# Patient Record
Sex: MALE | Race: OTHER | NOT HISPANIC OR LATINO | URBAN - METROPOLITAN AREA
[De-identification: names, ages, dates, MRNs, and addresses within clinical notes are randomized per-mention and may not be internally consistent; named-entity substitution may affect disease eponyms.]

---

## 2018-05-01 ENCOUNTER — EMERGENCY (EMERGENCY)
Facility: HOSPITAL | Age: 63
LOS: 0 days | Discharge: ANOTHER HOSPITAL | End: 2018-05-01
Attending: EMERGENCY MEDICINE
Payer: MEDICAID

## 2018-05-01 ENCOUNTER — OUTPATIENT (OUTPATIENT)
Dept: OUTPATIENT SERVICES | Facility: HOSPITAL | Age: 63
LOS: 1 days | End: 2018-05-01
Payer: MEDICAID

## 2018-05-01 ENCOUNTER — INPATIENT (INPATIENT)
Facility: HOSPITAL | Age: 63
LOS: 3 days | DRG: 270 | End: 2018-05-05
Attending: STUDENT IN AN ORGANIZED HEALTH CARE EDUCATION/TRAINING PROGRAM | Admitting: STUDENT IN AN ORGANIZED HEALTH CARE EDUCATION/TRAINING PROGRAM
Payer: MEDICAID

## 2018-05-01 VITALS
TEMPERATURE: 98 F | WEIGHT: 191.58 LBS | OXYGEN SATURATION: 100 % | HEART RATE: 114 BPM | DIASTOLIC BLOOD PRESSURE: 67 MMHG | HEIGHT: 67 IN | RESPIRATION RATE: 18 BRPM | SYSTOLIC BLOOD PRESSURE: 122 MMHG

## 2018-05-01 VITALS — HEART RATE: 120 BPM | SYSTOLIC BLOOD PRESSURE: 101 MMHG | DIASTOLIC BLOOD PRESSURE: 71 MMHG

## 2018-05-01 VITALS
HEIGHT: 68 IN | RESPIRATION RATE: 24 BRPM | HEART RATE: 127 BPM | WEIGHT: 197.98 LBS | OXYGEN SATURATION: 73 % | SYSTOLIC BLOOD PRESSURE: 88 MMHG | TEMPERATURE: 98 F | DIASTOLIC BLOOD PRESSURE: 67 MMHG

## 2018-05-01 DIAGNOSIS — Z29.9 ENCOUNTER FOR PROPHYLACTIC MEASURES, UNSPECIFIED: ICD-10-CM

## 2018-05-01 DIAGNOSIS — I23.8 OTHER CURRENT COMPLICATIONS FOLLOWING ACUTE MYOCARDIAL INFARCTION: ICD-10-CM

## 2018-05-01 DIAGNOSIS — I21.4 NON-ST ELEVATION (NSTEMI) MYOCARDIAL INFARCTION: ICD-10-CM

## 2018-05-01 DIAGNOSIS — I50.22 CHRONIC SYSTOLIC (CONGESTIVE) HEART FAILURE: ICD-10-CM

## 2018-05-01 DIAGNOSIS — N17.9 ACUTE KIDNEY FAILURE, UNSPECIFIED: ICD-10-CM

## 2018-05-01 DIAGNOSIS — I25.10 ATHEROSCLEROTIC HEART DISEASE OF NATIVE CORONARY ARTERY WITHOUT ANGINA PECTORIS: ICD-10-CM

## 2018-05-01 DIAGNOSIS — Z95.810 PRESENCE OF AUTOMATIC (IMPLANTABLE) CARDIAC DEFIBRILLATOR: Chronic | ICD-10-CM

## 2018-05-01 DIAGNOSIS — E78.5 HYPERLIPIDEMIA, UNSPECIFIED: ICD-10-CM

## 2018-05-01 DIAGNOSIS — I50.23 ACUTE ON CHRONIC SYSTOLIC (CONGESTIVE) HEART FAILURE: ICD-10-CM

## 2018-05-01 LAB
ALBUMIN SERPL ELPH-MCNC: 3.3 G/DL — SIGNIFICANT CHANGE UP (ref 3.3–5)
ALBUMIN SERPL ELPH-MCNC: 3.6 G/DL — SIGNIFICANT CHANGE UP (ref 3.3–5)
ALBUMIN SERPL ELPH-MCNC: 3.7 G/DL — SIGNIFICANT CHANGE UP (ref 3.3–5)
ALBUMIN SERPL ELPH-MCNC: 3.7 G/DL — SIGNIFICANT CHANGE UP (ref 3.3–5)
ALP SERPL-CCNC: 104 U/L — SIGNIFICANT CHANGE UP (ref 40–120)
ALP SERPL-CCNC: 104 U/L — SIGNIFICANT CHANGE UP (ref 40–120)
ALP SERPL-CCNC: 129 U/L — HIGH (ref 40–120)
ALP SERPL-CCNC: 96 U/L — SIGNIFICANT CHANGE UP (ref 40–120)
ALT FLD-CCNC: 1024 U/L — HIGH (ref 10–45)
ALT FLD-CCNC: 2027 U/L — HIGH (ref 10–45)
ALT FLD-CCNC: 288 U/L — HIGH (ref 12–78)
ALT FLD-CCNC: 302 U/L — HIGH (ref 10–45)
ANION GAP SERPL CALC-SCNC: 17 MMOL/L — SIGNIFICANT CHANGE UP (ref 5–17)
ANION GAP SERPL CALC-SCNC: 18 MMOL/L — HIGH (ref 5–17)
ANION GAP SERPL CALC-SCNC: 23 MMOL/L — HIGH (ref 5–17)
ANION GAP SERPL CALC-SCNC: 30 MMOL/L — HIGH (ref 5–17)
APTT BLD: 26.6 SEC — LOW (ref 27.5–37.4)
APTT BLD: 32 SEC — SIGNIFICANT CHANGE UP (ref 27.5–37.4)
APTT BLD: > 200 SEC (ref 27.5–37.4)
AST SERPL-CCNC: 1359 U/L — HIGH (ref 10–40)
AST SERPL-CCNC: 3202 U/L — HIGH (ref 10–40)
AST SERPL-CCNC: 367 U/L — HIGH (ref 15–37)
AST SERPL-CCNC: 376 U/L — HIGH (ref 10–40)
BASE EXCESS BLDMV CALC-SCNC: -0.5 MMOL/L — SIGNIFICANT CHANGE UP (ref -3–3)
BASE EXCESS BLDMV CALC-SCNC: -1.3 MMOL/L — SIGNIFICANT CHANGE UP (ref -3–3)
BASE EXCESS BLDMV CALC-SCNC: -13.4 MMOL/L — LOW (ref -3–3)
BASE EXCESS BLDMV CALC-SCNC: -2 MMOL/L — SIGNIFICANT CHANGE UP (ref -3–3)
BASE EXCESS BLDMV CALC-SCNC: -2.7 MMOL/L — SIGNIFICANT CHANGE UP (ref -3–3)
BASE EXCESS BLDMV CALC-SCNC: -5.7 MMOL/L — LOW (ref -3–3)
BASE EXCESS BLDMV CALC-SCNC: -9.6 MMOL/L — LOW (ref -3–3)
BASE EXCESS BLDMV CALC-SCNC: 0.3 MMOL/L — SIGNIFICANT CHANGE UP (ref -3–3)
BASOPHILS # BLD AUTO: 0.04 K/UL — SIGNIFICANT CHANGE UP (ref 0–0.2)
BASOPHILS NFR BLD AUTO: 0.3 % — SIGNIFICANT CHANGE UP (ref 0–2)
BILIRUB SERPL-MCNC: 2.5 MG/DL — HIGH (ref 0.2–1.2)
BILIRUB SERPL-MCNC: 2.6 MG/DL — HIGH (ref 0.2–1.2)
BILIRUB SERPL-MCNC: 2.7 MG/DL — HIGH (ref 0.2–1.2)
BILIRUB SERPL-MCNC: 3.3 MG/DL — HIGH (ref 0.2–1.2)
BUN SERPL-MCNC: 39 MG/DL — HIGH (ref 7–23)
BUN SERPL-MCNC: 42 MG/DL — HIGH (ref 7–23)
BUN SERPL-MCNC: 49 MG/DL — HIGH (ref 7–23)
BUN SERPL-MCNC: 54 MG/DL — HIGH (ref 7–23)
CALCIUM SERPL-MCNC: 7.9 MG/DL — LOW (ref 8.4–10.5)
CALCIUM SERPL-MCNC: 8.2 MG/DL — LOW (ref 8.4–10.5)
CALCIUM SERPL-MCNC: 8.4 MG/DL — LOW (ref 8.5–10.1)
CALCIUM SERPL-MCNC: 8.5 MG/DL — SIGNIFICANT CHANGE UP (ref 8.4–10.5)
CHLORIDE SERPL-SCNC: 100 MMOL/L — SIGNIFICANT CHANGE UP (ref 96–108)
CHLORIDE SERPL-SCNC: 100 MMOL/L — SIGNIFICANT CHANGE UP (ref 96–108)
CHLORIDE SERPL-SCNC: 106 MMOL/L — SIGNIFICANT CHANGE UP (ref 96–108)
CHLORIDE SERPL-SCNC: 99 MMOL/L — SIGNIFICANT CHANGE UP (ref 96–108)
CHOLEST SERPL-MCNC: 231 MG/DL — HIGH (ref 10–199)
CK MB BLD-MCNC: 5.1 % — HIGH (ref 0–3.5)
CK MB BLD-MCNC: 5.1 % — HIGH (ref 0–3.5)
CK MB BLD-MCNC: 5.9 % — HIGH (ref 0–3.5)
CK MB BLD-MCNC: 6.1 % — HIGH (ref 0–3.5)
CK MB CFR SERPL CALC: 20.7 NG/ML — HIGH (ref 0.5–3.6)
CK MB CFR SERPL CALC: 23.6 NG/ML — HIGH (ref 0–6.7)
CK MB CFR SERPL CALC: 28.2 NG/ML — HIGH (ref 0–6.7)
CK MB CFR SERPL CALC: 30 NG/ML — HIGH (ref 0–6.7)
CK SERPL-CCNC: 386 U/L — HIGH (ref 30–200)
CK SERPL-CCNC: 408 U/L — HIGH (ref 26–308)
CK SERPL-CCNC: 506 U/L — HIGH (ref 30–200)
CK SERPL-CCNC: 553 U/L — HIGH (ref 30–200)
CO2 BLDMV-SCNC: 13 MMOL/L — LOW (ref 21–29)
CO2 BLDMV-SCNC: 16 MMOL/L — LOW (ref 21–29)
CO2 BLDMV-SCNC: 19 MMOL/L — LOW (ref 21–29)
CO2 BLDMV-SCNC: 22 MMOL/L — SIGNIFICANT CHANGE UP (ref 21–29)
CO2 BLDMV-SCNC: 23 MMOL/L — SIGNIFICANT CHANGE UP (ref 21–29)
CO2 BLDMV-SCNC: 23 MMOL/L — SIGNIFICANT CHANGE UP (ref 21–29)
CO2 BLDMV-SCNC: 24 MMOL/L — SIGNIFICANT CHANGE UP (ref 21–29)
CO2 BLDMV-SCNC: 25 MMOL/L — SIGNIFICANT CHANGE UP (ref 21–29)
CO2 SERPL-SCNC: 10 MMOL/L — CRITICAL LOW (ref 22–31)
CO2 SERPL-SCNC: 11 MMOL/L — LOW (ref 22–31)
CO2 SERPL-SCNC: 18 MMOL/L — LOW (ref 22–31)
CO2 SERPL-SCNC: 18 MMOL/L — LOW (ref 22–31)
CREAT SERPL-MCNC: 1.8 MG/DL — HIGH (ref 0.5–1.3)
CREAT SERPL-MCNC: 2.22 MG/DL — HIGH (ref 0.5–1.3)
CREAT SERPL-MCNC: 2.24 MG/DL — HIGH (ref 0.5–1.3)
CREAT SERPL-MCNC: 2.26 MG/DL — HIGH (ref 0.5–1.3)
EOSINOPHIL # BLD AUTO: 0 K/UL — SIGNIFICANT CHANGE UP (ref 0–0.5)
EOSINOPHIL NFR BLD AUTO: 0 % — SIGNIFICANT CHANGE UP (ref 0–6)
GAS PNL BLDA: SIGNIFICANT CHANGE UP
GAS PNL BLDMV: SIGNIFICANT CHANGE UP
GLUCOSE SERPL-MCNC: 105 MG/DL — HIGH (ref 70–99)
GLUCOSE SERPL-MCNC: 124 MG/DL — HIGH (ref 70–99)
GLUCOSE SERPL-MCNC: 127 MG/DL — HIGH (ref 70–99)
GLUCOSE SERPL-MCNC: 157 MG/DL — HIGH (ref 70–99)
HBA1C BLD-MCNC: 5.5 % — SIGNIFICANT CHANGE UP (ref 4–5.6)
HCO3 BLDMV-SCNC: 12 MMOL/L — LOW (ref 20–28)
HCO3 BLDMV-SCNC: 15 MMOL/L — LOW (ref 20–28)
HCO3 BLDMV-SCNC: 18 MMOL/L — LOW (ref 20–28)
HCO3 BLDMV-SCNC: 21 MMOL/L — SIGNIFICANT CHANGE UP (ref 20–28)
HCO3 BLDMV-SCNC: 22 MMOL/L — SIGNIFICANT CHANGE UP (ref 20–28)
HCO3 BLDMV-SCNC: 22 MMOL/L — SIGNIFICANT CHANGE UP (ref 20–28)
HCO3 BLDMV-SCNC: 23 MMOL/L — SIGNIFICANT CHANGE UP (ref 20–28)
HCO3 BLDMV-SCNC: 24 MMOL/L — SIGNIFICANT CHANGE UP (ref 20–28)
HCT VFR BLD CALC: 35.9 % — LOW (ref 39–50)
HCT VFR BLD CALC: 38.4 % — LOW (ref 39–50)
HCT VFR BLD CALC: 39.9 % — SIGNIFICANT CHANGE UP (ref 39–50)
HCT VFR BLD CALC: 42.6 % — SIGNIFICANT CHANGE UP (ref 39–50)
HDLC SERPL-MCNC: 21 MG/DL — LOW (ref 40–125)
HGB BLD-MCNC: 11.7 G/DL — LOW (ref 13–17)
HGB BLD-MCNC: 12.6 G/DL — LOW (ref 13–17)
HGB BLD-MCNC: 12.7 G/DL — LOW (ref 13–17)
HGB BLD-MCNC: 12.7 G/DL — LOW (ref 13–17)
HOROWITZ INDEX BLDMV+IHG-RTO: 21 — SIGNIFICANT CHANGE UP
HOROWITZ INDEX BLDMV+IHG-RTO: 21 — SIGNIFICANT CHANGE UP
HOROWITZ INDEX BLDMV+IHG-RTO: 28 — SIGNIFICANT CHANGE UP
HOROWITZ INDEX BLDMV+IHG-RTO: 40 — SIGNIFICANT CHANGE UP
HOROWITZ INDEX BLDMV+IHG-RTO: 40 — SIGNIFICANT CHANGE UP
IMM GRANULOCYTES NFR BLD AUTO: 0.5 % — SIGNIFICANT CHANGE UP (ref 0–1.5)
INR BLD: 1.41 RATIO — HIGH (ref 0.88–1.16)
INR BLD: 1.74 RATIO — HIGH (ref 0.88–1.16)
INR BLD: 2 RATIO — HIGH (ref 0.88–1.16)
LACTATE SERPL-SCNC: 10.2 MMOL/L — CRITICAL HIGH (ref 0.7–2)
LACTATE SERPL-SCNC: 2.4 MMOL/L — HIGH (ref 0.7–2)
LIPID PNL WITH DIRECT LDL SERPL: 195 MG/DL — HIGH
LYMPHOCYTES # BLD AUTO: 1.18 K/UL — SIGNIFICANT CHANGE UP (ref 1–3.3)
LYMPHOCYTES # BLD AUTO: 8.7 % — LOW (ref 13–44)
MAGNESIUM SERPL-MCNC: 2 MG/DL — SIGNIFICANT CHANGE UP (ref 1.6–2.6)
MAGNESIUM SERPL-MCNC: 2.2 MG/DL — SIGNIFICANT CHANGE UP (ref 1.6–2.6)
MAGNESIUM SERPL-MCNC: 2.3 MG/DL — SIGNIFICANT CHANGE UP (ref 1.6–2.6)
MCHC RBC-ENTMCNC: 19.4 PG — LOW (ref 27–34)
MCHC RBC-ENTMCNC: 21 PG — LOW (ref 27–34)
MCHC RBC-ENTMCNC: 21.1 PG — LOW (ref 27–34)
MCHC RBC-ENTMCNC: 21.4 PG — LOW (ref 27–34)
MCHC RBC-ENTMCNC: 29.6 GM/DL — LOW (ref 32–36)
MCHC RBC-ENTMCNC: 31.7 GM/DL — LOW (ref 32–36)
MCHC RBC-ENTMCNC: 32.6 GM/DL — SIGNIFICANT CHANGE UP (ref 32–36)
MCHC RBC-ENTMCNC: 33 GM/DL — SIGNIFICANT CHANGE UP (ref 32–36)
MCV RBC AUTO: 64.7 FL — LOW (ref 80–100)
MCV RBC AUTO: 64.8 FL — LOW (ref 80–100)
MCV RBC AUTO: 65.5 FL — LOW (ref 80–100)
MCV RBC AUTO: 66.2 FL — LOW (ref 80–100)
MONOCYTES # BLD AUTO: 0.98 K/UL — HIGH (ref 0–0.9)
MONOCYTES NFR BLD AUTO: 7.3 % — SIGNIFICANT CHANGE UP (ref 2–14)
NEUTROPHILS # BLD AUTO: 11.22 K/UL — HIGH (ref 1.8–7.4)
NEUTROPHILS NFR BLD AUTO: 83.2 % — HIGH (ref 43–77)
NRBC # BLD: 0 /100 WBCS — SIGNIFICANT CHANGE UP (ref 0–0)
NT-PROBNP SERPL-SCNC: HIGH PG/ML (ref 0–300)
O2 CT VFR BLD CALC: 36 MMHG — SIGNIFICANT CHANGE UP (ref 30–65)
O2 CT VFR BLD CALC: 37 MMHG — SIGNIFICANT CHANGE UP (ref 30–65)
O2 CT VFR BLD CALC: 38 MMHG — SIGNIFICANT CHANGE UP (ref 30–65)
O2 CT VFR BLD CALC: 38 MMHG — SIGNIFICANT CHANGE UP (ref 30–65)
O2 CT VFR BLD CALC: 40 MMHG — SIGNIFICANT CHANGE UP (ref 30–65)
O2 CT VFR BLD CALC: 41 MMHG — SIGNIFICANT CHANGE UP (ref 30–65)
PCO2 BLDMV: 29 MMHG — LOW (ref 30–65)
PCO2 BLDMV: 31 MMHG — SIGNIFICANT CHANGE UP (ref 30–65)
PCO2 BLDMV: 33 MMHG — SIGNIFICANT CHANGE UP (ref 30–65)
PCO2 BLDMV: 34 MMHG — SIGNIFICANT CHANGE UP (ref 30–65)
PCO2 BLDMV: 35 MMHG — SIGNIFICANT CHANGE UP (ref 30–65)
PCO2 BLDMV: 36 MMHG — SIGNIFICANT CHANGE UP (ref 30–65)
PCO2 BLDMV: 37 MMHG — SIGNIFICANT CHANGE UP (ref 30–65)
PCO2 BLDMV: 38 MMHG — SIGNIFICANT CHANGE UP (ref 30–65)
PCP SPEC-MCNC: SIGNIFICANT CHANGE UP
PH BLDMV: 7.25 — LOW (ref 7.32–7.45)
PH BLDMV: 7.31 — LOW (ref 7.32–7.45)
PH BLDMV: 7.36 — SIGNIFICANT CHANGE UP (ref 7.32–7.45)
PH BLDMV: 7.4 — SIGNIFICANT CHANGE UP (ref 7.32–7.45)
PH BLDMV: 7.41 — SIGNIFICANT CHANGE UP (ref 7.32–7.45)
PH BLDMV: 7.41 — SIGNIFICANT CHANGE UP (ref 7.32–7.45)
PH BLDMV: 7.42 — SIGNIFICANT CHANGE UP (ref 7.32–7.45)
PH BLDMV: 7.43 — SIGNIFICANT CHANGE UP (ref 7.32–7.45)
PHOSPHATE SERPL-MCNC: 3.3 MG/DL — SIGNIFICANT CHANGE UP (ref 2.5–4.5)
PHOSPHATE SERPL-MCNC: 4.4 MG/DL — SIGNIFICANT CHANGE UP (ref 2.5–4.5)
PHOSPHATE SERPL-MCNC: 7.1 MG/DL — HIGH (ref 2.5–4.5)
PLATELET # BLD AUTO: 163 K/UL — SIGNIFICANT CHANGE UP (ref 150–400)
PLATELET # BLD AUTO: 200 K/UL — SIGNIFICANT CHANGE UP (ref 150–400)
PLATELET # BLD AUTO: 204 K/UL — SIGNIFICANT CHANGE UP (ref 150–400)
PLATELET # BLD AUTO: 228 K/UL — SIGNIFICANT CHANGE UP (ref 150–400)
POTASSIUM SERPL-MCNC: 3.9 MMOL/L — SIGNIFICANT CHANGE UP (ref 3.5–5.3)
POTASSIUM SERPL-MCNC: 4.4 MMOL/L — SIGNIFICANT CHANGE UP (ref 3.5–5.3)
POTASSIUM SERPL-MCNC: 4.7 MMOL/L — SIGNIFICANT CHANGE UP (ref 3.5–5.3)
POTASSIUM SERPL-MCNC: 5 MMOL/L — SIGNIFICANT CHANGE UP (ref 3.5–5.3)
POTASSIUM SERPL-SCNC: 3.9 MMOL/L — SIGNIFICANT CHANGE UP (ref 3.5–5.3)
POTASSIUM SERPL-SCNC: 4.4 MMOL/L — SIGNIFICANT CHANGE UP (ref 3.5–5.3)
POTASSIUM SERPL-SCNC: 4.7 MMOL/L — SIGNIFICANT CHANGE UP (ref 3.5–5.3)
POTASSIUM SERPL-SCNC: 5 MMOL/L — SIGNIFICANT CHANGE UP (ref 3.5–5.3)
PROT SERPL-MCNC: 5.8 G/DL — LOW (ref 6–8.3)
PROT SERPL-MCNC: 6.4 G/DL — SIGNIFICANT CHANGE UP (ref 6–8.3)
PROT SERPL-MCNC: 6.4 G/DL — SIGNIFICANT CHANGE UP (ref 6–8.3)
PROT SERPL-MCNC: 6.9 GM/DL — SIGNIFICANT CHANGE UP (ref 6–8.3)
PROTHROM AB SERPL-ACNC: 15.5 SEC — HIGH (ref 9.8–12.7)
PROTHROM AB SERPL-ACNC: 19.2 SEC — HIGH (ref 9.8–12.7)
PROTHROM AB SERPL-ACNC: 21.9 SEC — HIGH (ref 9.8–12.7)
RBC # BLD: 5.54 M/UL — SIGNIFICANT CHANGE UP (ref 4.2–5.8)
RBC # BLD: 5.93 M/UL — HIGH (ref 4.2–5.8)
RBC # BLD: 6.03 M/UL — HIGH (ref 4.2–5.8)
RBC # BLD: 6.5 M/UL — HIGH (ref 4.2–5.8)
RBC # FLD: 15.4 % — HIGH (ref 10.3–14.5)
RBC # FLD: 15.5 % — HIGH (ref 10.3–14.5)
RBC # FLD: 15.8 % — HIGH (ref 10.3–14.5)
RBC # FLD: 18.7 % — HIGH (ref 10.3–14.5)
SAO2 % BLDMV: 53 % — LOW (ref 60–90)
SAO2 % BLDMV: 57 % — LOW (ref 60–90)
SAO2 % BLDMV: 59 % — LOW (ref 60–90)
SAO2 % BLDMV: 61 % — SIGNIFICANT CHANGE UP (ref 60–90)
SAO2 % BLDMV: 62 % — SIGNIFICANT CHANGE UP (ref 60–90)
SAO2 % BLDMV: 62 % — SIGNIFICANT CHANGE UP (ref 60–90)
SAO2 % BLDMV: 66 % — SIGNIFICANT CHANGE UP (ref 60–90)
SAO2 % BLDMV: 67 % — SIGNIFICANT CHANGE UP (ref 60–90)
SODIUM SERPL-SCNC: 134 MMOL/L — LOW (ref 135–145)
SODIUM SERPL-SCNC: 136 MMOL/L — SIGNIFICANT CHANGE UP (ref 135–145)
SODIUM SERPL-SCNC: 139 MMOL/L — SIGNIFICANT CHANGE UP (ref 135–145)
SODIUM SERPL-SCNC: 141 MMOL/L — SIGNIFICANT CHANGE UP (ref 135–145)
TOTAL CHOLESTEROL/HDL RATIO MEASUREMENT: 11 RATIO — HIGH (ref 3.4–9.6)
TRIGL SERPL-MCNC: 76 MG/DL — SIGNIFICANT CHANGE UP (ref 10–149)
TROPONIN I SERPL-MCNC: 27.5 NG/ML — HIGH (ref 0.01–0.04)
TROPONIN T SERPL-MCNC: 1.16 NG/ML — HIGH (ref 0–0.06)
TROPONIN T SERPL-MCNC: 1.98 NG/ML — HIGH (ref 0–0.06)
TROPONIN T SERPL-MCNC: 2.17 NG/ML — HIGH (ref 0–0.06)
WBC # BLD: 13.49 K/UL — HIGH (ref 3.8–10.5)
WBC # BLD: 15.3 K/UL — HIGH (ref 3.8–10.5)
WBC # BLD: 18.7 K/UL — HIGH (ref 3.8–10.5)
WBC # BLD: 19.7 K/UL — HIGH (ref 3.8–10.5)
WBC # FLD AUTO: 13.49 K/UL — HIGH (ref 3.8–10.5)
WBC # FLD AUTO: 15.3 K/UL — HIGH (ref 3.8–10.5)
WBC # FLD AUTO: 18.7 K/UL — HIGH (ref 3.8–10.5)
WBC # FLD AUTO: 19.7 K/UL — HIGH (ref 3.8–10.5)

## 2018-05-01 PROCEDURE — 71045 X-RAY EXAM CHEST 1 VIEW: CPT | Mod: 26,59,77

## 2018-05-01 PROCEDURE — 71045 X-RAY EXAM CHEST 1 VIEW: CPT | Mod: 26

## 2018-05-01 PROCEDURE — 70450 CT HEAD/BRAIN W/O DYE: CPT | Mod: 26

## 2018-05-01 PROCEDURE — 33967 INSERT I-AORT PERCUT DEVICE: CPT

## 2018-05-01 PROCEDURE — 99223 1ST HOSP IP/OBS HIGH 75: CPT | Mod: 25,GC

## 2018-05-01 PROCEDURE — 93010 ELECTROCARDIOGRAM REPORT: CPT

## 2018-05-01 PROCEDURE — 93306 TTE W/DOPPLER COMPLETE: CPT | Mod: 26

## 2018-05-01 PROCEDURE — 99291 CRITICAL CARE FIRST HOUR: CPT | Mod: 25

## 2018-05-01 PROCEDURE — G9001: CPT

## 2018-05-01 PROCEDURE — 71275 CT ANGIOGRAPHY CHEST: CPT | Mod: 26

## 2018-05-01 PROCEDURE — 75716 ARTERY X-RAYS ARMS/LEGS: CPT | Mod: 26,59

## 2018-05-01 PROCEDURE — 93456 R HRT CORONARY ARTERY ANGIO: CPT | Mod: 26

## 2018-05-01 PROCEDURE — 74175 CTA ABDOMEN W/CONTRAST: CPT | Mod: 26

## 2018-05-01 PROCEDURE — 90791 PSYCH DIAGNOSTIC EVALUATION: CPT

## 2018-05-01 RX ORDER — METOPROLOL TARTRATE 50 MG
12.5 TABLET ORAL
Qty: 0 | Refills: 0 | Status: DISCONTINUED | OUTPATIENT
Start: 2018-05-01 | End: 2018-05-01

## 2018-05-01 RX ORDER — SODIUM CHLORIDE 9 MG/ML
1000 INJECTION INTRAMUSCULAR; INTRAVENOUS; SUBCUTANEOUS ONCE
Qty: 0 | Refills: 0 | Status: COMPLETED | OUTPATIENT
Start: 2018-05-01 | End: 2018-05-01

## 2018-05-01 RX ORDER — METOPROLOL TARTRATE 50 MG
1 TABLET ORAL
Qty: 0 | Refills: 0 | COMMUNITY

## 2018-05-01 RX ORDER — FUROSEMIDE 40 MG
40 TABLET ORAL ONCE
Qty: 0 | Refills: 0 | Status: COMPLETED | OUTPATIENT
Start: 2018-05-01 | End: 2018-05-01

## 2018-05-01 RX ORDER — ATORVASTATIN CALCIUM 80 MG/1
40 TABLET, FILM COATED ORAL AT BEDTIME
Qty: 0 | Refills: 0 | Status: DISCONTINUED | OUTPATIENT
Start: 2018-05-01 | End: 2018-05-02

## 2018-05-01 RX ORDER — TICAGRELOR 90 MG/1
180 TABLET ORAL ONCE
Qty: 0 | Refills: 0 | Status: COMPLETED | OUTPATIENT
Start: 2018-05-01 | End: 2018-05-01

## 2018-05-01 RX ORDER — HEPARIN SODIUM 5000 [USP'U]/ML
5300 INJECTION INTRAVENOUS; SUBCUTANEOUS EVERY 6 HOURS
Qty: 0 | Refills: 0 | Status: DISCONTINUED | OUTPATIENT
Start: 2018-05-01 | End: 2018-05-01

## 2018-05-01 RX ORDER — HEPARIN SODIUM 5000 [USP'U]/ML
INJECTION INTRAVENOUS; SUBCUTANEOUS
Qty: 25000 | Refills: 0 | Status: DISCONTINUED | OUTPATIENT
Start: 2018-05-01 | End: 2018-05-01

## 2018-05-01 RX ORDER — SIMVASTATIN 20 MG/1
1 TABLET, FILM COATED ORAL
Qty: 0 | Refills: 0 | COMMUNITY

## 2018-05-01 RX ORDER — ASPIRIN/CALCIUM CARB/MAGNESIUM 324 MG
325 TABLET ORAL ONCE
Qty: 0 | Refills: 0 | Status: COMPLETED | OUTPATIENT
Start: 2018-05-01 | End: 2018-05-01

## 2018-05-01 RX ORDER — CLOPIDOGREL BISULFATE 75 MG/1
75 TABLET, FILM COATED ORAL DAILY
Qty: 0 | Refills: 0 | Status: DISCONTINUED | OUTPATIENT
Start: 2018-05-01 | End: 2018-05-01

## 2018-05-01 RX ORDER — HEPARIN SODIUM 5000 [USP'U]/ML
INJECTION INTRAVENOUS; SUBCUTANEOUS
Qty: 25000 | Refills: 0 | Status: DISCONTINUED | OUTPATIENT
Start: 2018-05-01 | End: 2018-05-04

## 2018-05-01 RX ORDER — METOPROLOL TARTRATE 50 MG
25 TABLET ORAL DAILY
Qty: 0 | Refills: 0 | Status: DISCONTINUED | OUTPATIENT
Start: 2018-05-01 | End: 2018-05-01

## 2018-05-01 RX ORDER — FUROSEMIDE 40 MG
10 TABLET ORAL
Qty: 500 | Refills: 0 | Status: DISCONTINUED | OUTPATIENT
Start: 2018-05-01 | End: 2018-05-02

## 2018-05-01 RX ORDER — SIMVASTATIN 20 MG/1
80 TABLET, FILM COATED ORAL AT BEDTIME
Qty: 0 | Refills: 0 | Status: DISCONTINUED | OUTPATIENT
Start: 2018-05-01 | End: 2018-05-01

## 2018-05-01 RX ORDER — TICAGRELOR 90 MG/1
90 TABLET ORAL
Qty: 0 | Refills: 0 | Status: DISCONTINUED | OUTPATIENT
Start: 2018-05-01 | End: 2018-05-03

## 2018-05-01 RX ORDER — ONDANSETRON 8 MG/1
4 TABLET, FILM COATED ORAL ONCE
Qty: 0 | Refills: 0 | Status: DISCONTINUED | OUTPATIENT
Start: 2018-05-01 | End: 2018-05-01

## 2018-05-01 RX ORDER — HEPARIN SODIUM 5000 [USP'U]/ML
5300 INJECTION INTRAVENOUS; SUBCUTANEOUS EVERY 6 HOURS
Qty: 0 | Refills: 0 | Status: DISCONTINUED | OUTPATIENT
Start: 2018-05-01 | End: 2018-05-04

## 2018-05-01 RX ORDER — MILRINONE LACTATE 1 MG/ML
0.5 INJECTION, SOLUTION INTRAVENOUS
Qty: 20 | Refills: 0 | Status: DISCONTINUED | OUTPATIENT
Start: 2018-05-01 | End: 2018-05-05

## 2018-05-01 RX ORDER — CLOPIDOGREL BISULFATE 75 MG/1
1 TABLET, FILM COATED ORAL
Qty: 0 | Refills: 0 | COMMUNITY

## 2018-05-01 RX ORDER — ASPIRIN/CALCIUM CARB/MAGNESIUM 324 MG
81 TABLET ORAL DAILY
Qty: 0 | Refills: 0 | Status: DISCONTINUED | OUTPATIENT
Start: 2018-05-01 | End: 2018-05-03

## 2018-05-01 RX ORDER — INFLUENZA VIRUS VACCINE 15; 15; 15; 15 UG/.5ML; UG/.5ML; UG/.5ML; UG/.5ML
0.5 SUSPENSION INTRAMUSCULAR ONCE
Qty: 0 | Refills: 0 | Status: DISCONTINUED | OUTPATIENT
Start: 2018-05-01 | End: 2018-05-05

## 2018-05-01 RX ADMIN — Medication 40 MILLIGRAM(S): at 09:00

## 2018-05-01 RX ADMIN — Medication 12.5 MILLIGRAM(S): at 08:04

## 2018-05-01 RX ADMIN — Medication 5 MG/HR: at 15:40

## 2018-05-01 RX ADMIN — HEPARIN SODIUM 750 UNIT(S)/HR: 5000 INJECTION INTRAVENOUS; SUBCUTANEOUS at 09:00

## 2018-05-01 RX ADMIN — HEPARIN SODIUM 5300 UNIT(S): 5000 INJECTION INTRAVENOUS; SUBCUTANEOUS at 02:57

## 2018-05-01 RX ADMIN — HEPARIN SODIUM 1000 UNIT(S)/HR: 5000 INJECTION INTRAVENOUS; SUBCUTANEOUS at 21:29

## 2018-05-01 RX ADMIN — Medication 325 MILLIGRAM(S): at 02:40

## 2018-05-01 RX ADMIN — Medication 40 MILLIGRAM(S): at 21:07

## 2018-05-01 RX ADMIN — HEPARIN SODIUM 1000 UNIT(S)/HR: 5000 INJECTION INTRAVENOUS; SUBCUTANEOUS at 02:55

## 2018-05-01 RX ADMIN — TICAGRELOR 180 MILLIGRAM(S): 90 TABLET ORAL at 02:56

## 2018-05-01 RX ADMIN — Medication 40 MILLIGRAM(S): at 10:07

## 2018-05-01 RX ADMIN — MILRINONE LACTATE 3.26 MICROGRAM(S)/KG/MIN: 1 INJECTION, SOLUTION INTRAVENOUS at 14:40

## 2018-05-01 RX ADMIN — SODIUM CHLORIDE 1000 MILLILITER(S): 9 INJECTION INTRAMUSCULAR; INTRAVENOUS; SUBCUTANEOUS at 02:13

## 2018-05-01 NOTE — PROGRESS NOTE ADULT - SUBJECTIVE AND OBJECTIVE BOX
CCU Accept Note    Patient is a 63y old  Male who presents with a chief complaint of chest pain and SOB    Interval Events: Pt had decompensation this AM in PICU w/ acute pulmonary edema requiring IV lasix and BiPAP. Had echocardiogram performed w/ severe, LV dysfunction, EF ~17%. Care transferred to CCU.     HPI:  This is a 62 y/o M with CAD s/p MI and cardiac arrests, remote stents x 10 (2005), HFrEF s/p AICD ([employer]) with battery change 3 years ago, AAA, gastic ulcers, HLD, and BPH presented to [facility] c/o difficulty breathing and abdominal pain with wretching which they attributed to constipation.  Per patient’s spouse, he has been intermittent non-radiating chest pain x 1 month but refused to come to the ED.  Most of the info is obtained from patient’s spouse.  He has been chronically c/o dizziness and left arm pain since ICD implantation.  Patient has been off his medications (ASA, Plavix, and Lipitor) x 1 month and Entresto 8 months ago due to lack of insurance.  Denies CP, DAVIS, orthopnea, weight gain, fever, sick contacts, or recent travels.  Of note, patient has had difficulty remembering events since his cardiac arrest in 2005, according to his neurologist due to anoxia per spouse.  In OSH ED, EKG showed LBBB and elevated cardiac enzymes (GGH=786, CK/MB=20.7, Trop I=27.5).  Transferred to [facility] for concern of STEMI.  CTA of the chest, abdomen, and pelvis done showing no evidence of aortic dissection; 3.7 cm infrarenal abdominal aortic aneurysm with partial thrombotic plaque.  No central pulmonary embolism.  Small bilateral pleural effusions.  CT head showed no intracranial hemorrhage, mass effect or large acute cortical infarct.    Upon arrival to PICU, patient was pain-free, denies difficulty breathing but c/o back pain, attributing it to the stretcher. Loaded with ASA, Plavix, started on lopressor 12.5mg 2xd. Decompensated 5/1 AM requiring Lasix 80mg IVP and BIPAP support    REVIEW OF SYSTEMS: + Shortness of breath  	    MEDICATIONS  (STANDING):  clopidogrel Tablet 75 milliGRAM(s) Oral daily  heparin  Infusion.  Unit(s)/Hr (10 mL/Hr) IV Continuous <Continuous>  influenza   Vaccine 0.5 milliLiter(s) IntraMuscular once  metoprolol tartrate 12.5 milliGRAM(s) Oral two times a day  simvastatin 80 milliGRAM(s) Oral at bedtime    MEDICATIONS  (PRN):  heparin  Injectable 5300 Unit(s) IV Push every 6 hours PRN For aPTT less than 40      PHYSICAL EXAM:  Vital Signs Last 24 Hrs  T(C): 36.6 (01 May 2018 05:20), Max: 36.7 (01 May 2018 01:12)  T(F): 97.9 (01 May 2018 05:20), Max: 98 (01 May 2018 01:12)  HR: 114 (01 May 2018 06:00) (114 - 127)  BP: 95/80 (01 May 2018 06:00) (88/67 - 122/67)  BP(mean): 86 (01 May 2018 06:00) (76 - 86)  RR: 21 (01 May 2018 06:00) (18 - 25)  SpO2: 100% (01 May 2018 06:00) (73% - 100%)  I&O's Summary    30 Apr 2018 07:01  -  01 May 2018 07:00  --------------------------------------------------------  IN: 20 mL / OUT: 0 mL / NET: 20 mL      Appearance: tachypneic  HEENT:   Normal oral mucosa, PERRL, EOMI	  Cardiovascular: Normal S1 S2, +JVD, No murmurs, No edema  Respiratory: Crackles mid>lower b/l bases  Psychiatry: A & O x 3, Mood & affect appropriate  Gastrointestinal:  Soft, Non-tender, + BS	  Skin: No rashes, No ecchymoses, No cyanosis	  Neurologic: Non-focal  Extremities: Normal range of motion, No clubbing, cyanosis or edema  Vascular: Peripheral pulses palpable 2+ bilaterally    LABS:	 	                        12.7   15.3  )-----------( 204      ( 01 May 2018 06:35 )             38.4     Auto Eosinophil # x     / Auto Eosinophil % x     / Auto Neutrophil # x     / Auto Neutrophil % x     / BANDS % x                            12.6   13.49 )-----------( 228      ( 01 May 2018 01:37 )             42.6     Auto Eosinophil # 0.00  / Auto Eosinophil % 0.0   / Auto Neutrophil # 11.22 / Auto Neutrophil % 83.2  / BANDS % x        INR: 1.74 ratio (05-01 @ 06:35)  INR: 1.41 ratio (05-01 @ 01:37)    05-01    134<L>  |  100  |  42<H>  ----------------------------<  127<H>  4.7   |  11<L>  |  1.80<H>  05-01    141  |  106  |  39<H>  ----------------------------<  124<H>  4.4   |  18<L>  |  2.24<H>    Ca    8.5      01 May 2018 06:35  Mg     2.2     05-01  Phos  4.4     05-01  TPro  6.4  /  Alb  3.6  /  TBili  2.5<H>  /  DBili  x   /  AST  376<H>  /  ALT  302<H>  /  AlkPhos  104  05-01  TPro  6.9  /  Alb  3.7  /  TBili  2.6<H>  /  DBili  x   /  AST  367<H>  /  ALT  288<H>  /  AlkPhos  129<H>  05-01      proBNP: Serum Pro-Brain Natriuretic Peptide: 65965 pg/mL (05-01 @ 06:35)    CARDIAC MARKERS:   01 May 2018 06:35 Troponin x     / Creatine Kinase 386 U/L /  CKMB 23.6 ng/mL / CPK Mass Assay % 6.1 %   01 May 2018 01:37 Troponin x     / Creatine Kinase 408 U/L /  CKMB 20.7 ng/mL / CPK Mass Assay % 5.1 %      TELEMETRY: 	Sinus tachycardia    ECG:  	Sinus tach with LBBB  RADIOLOGY:		  	< from: Xray Chest 1 View- PORTABLE-Urgent (05.01.18 @ 01:38) >  FINDINGS:     There is a cardiac conduction device overlying the left axilla with   intact leads to the heart.    Lungs: The lungs are clear.  Heart: The heart is normal in size.  Mediastinum: The mediastinum is within normal limits.    IMPRESSION:    Clear lungs.    < end of copied text > CCU Accept Note    Patient is a 63y old  Male who presents with a chief complaint of chest pain and SOB    Interval Events: Pt had decompensation this AM in PICU w/ acute pulmonary edema requiring IV lasix and BiPAP. Had echocardiogram performed w/ severe, LV dysfunction, EF ~17%. Care transferred to CCU.     HPI:  This is a 62 y/o M with CAD s/p MI and cardiac arrests, remote stents x 10 (2005), HFrEF s/p AICD ([employer]) with battery change 3 years ago, AAA, gastic ulcers, HLD, and BPH presented to [facility] c/o difficulty breathing and abdominal pain with wretching which they attributed to constipation.  Per patient’s spouse, he has been intermittent non-radiating chest pain x 1 month but refused to come to the ED.  Most of the info is obtained from patient’s spouse.  He has been chronically c/o dizziness and left arm pain since ICD implantation.  Patient has been off his medications (ASA, Plavix, and Lipitor) x 1 month and Entresto 8 months ago due to lack of insurance.  Denies CP, DAVIS, orthopnea, weight gain, fever, sick contacts, or recent travels.  Of note, patient has had difficulty remembering events since his cardiac arrest in 2005, according to his neurologist due to anoxia per spouse.  In OSH ED, EKG showed LBBB and elevated cardiac enzymes (WBR=839, CK/MB=20.7, Trop I=27.5).  Transferred to [facility] for concern of STEMI.  CTA of the chest, abdomen, and pelvis done showing no evidence of aortic dissection; 3.7 cm infrarenal abdominal aortic aneurysm with partial thrombotic plaque.  No central pulmonary embolism.  Small bilateral pleural effusions.  CT head showed no intracranial hemorrhage, mass effect or large acute cortical infarct.    Upon arrival to PICU, patient was pain-free, denies difficulty breathing but c/o back pain, attributing it to the stretcher. Loaded with ASA, Plavix, started on lopressor 12.5mg 2xd. Decompensated 5/1 AM requiring Lasix 80mg IVP and BIPAP support    REVIEW OF SYSTEMS: + Shortness of breath  	    MEDICATIONS  (STANDING):  clopidogrel Tablet 75 milliGRAM(s) Oral daily  heparin  Infusion.  Unit(s)/Hr (10 mL/Hr) IV Continuous <Continuous>  influenza   Vaccine 0.5 milliLiter(s) IntraMuscular once  metoprolol tartrate 12.5 milliGRAM(s) Oral two times a day  simvastatin 80 milliGRAM(s) Oral at bedtime    MEDICATIONS  (PRN):  heparin  Injectable 5300 Unit(s) IV Push every 6 hours PRN For aPTT less than 40      PHYSICAL EXAM:  Vital Signs Last 24 Hrs  T(C): 36.6 (01 May 2018 05:20), Max: 36.7 (01 May 2018 01:12)  T(F): 97.9 (01 May 2018 05:20), Max: 98 (01 May 2018 01:12)  HR: 114 (01 May 2018 06:00) (114 - 127)  BP: 95/80 (01 May 2018 06:00) (88/67 - 122/67)  BP(mean): 86 (01 May 2018 06:00) (76 - 86)  RR: 21 (01 May 2018 06:00) (18 - 25)  SpO2: 100% (01 May 2018 06:00) (73% - 100%)  I&O's Summary    30 Apr 2018 07:01  -  01 May 2018 07:00  --------------------------------------------------------  IN: 20 mL / OUT: 0 mL / NET: 20 mL      Appearance: tachypneic  HEENT:   Normal oral mucosa, PERRL, EOMI	  Cardiovascular: Normal S1 S2, +JVD, No murmurs, No edema  Respiratory: Basilar crackles to mid lung bilaterally  Psychiatry: A & O x 3, Mood & affect appropriate  Gastrointestinal:  Soft, Non-tender, + BS	  Skin: No rashes, No ecchymoses, No cyanosis	  Neurologic: Non-focal  Extremities: Normal range of motion, No clubbing, cyanosis or edema  Vascular: Peripheral pulses palpable 2+ bilaterally    LABS:	 	                        12.7   15.3  )-----------( 204      ( 01 May 2018 06:35 )             38.4     Auto Eosinophil # x     / Auto Eosinophil % x     / Auto Neutrophil # x     / Auto Neutrophil % x     / BANDS % x                            12.6   13.49 )-----------( 228      ( 01 May 2018 01:37 )             42.6     Auto Eosinophil # 0.00  / Auto Eosinophil % 0.0   / Auto Neutrophil # 11.22 / Auto Neutrophil % 83.2  / BANDS % x        INR: 1.74 ratio (05-01 @ 06:35)  INR: 1.41 ratio (05-01 @ 01:37)    05-01    134<L>  |  100  |  42<H>  ----------------------------<  127<H>  4.7   |  11<L>  |  1.80<H>  05-01    141  |  106  |  39<H>  ----------------------------<  124<H>  4.4   |  18<L>  |  2.24<H>    Ca    8.5      01 May 2018 06:35  Mg     2.2     05-01  Phos  4.4     05-01  TPro  6.4  /  Alb  3.6  /  TBili  2.5<H>  /  DBili  x   /  AST  376<H>  /  ALT  302<H>  /  AlkPhos  104  05-01  TPro  6.9  /  Alb  3.7  /  TBili  2.6<H>  /  DBili  x   /  AST  367<H>  /  ALT  288<H>  /  AlkPhos  129<H>  05-01      proBNP: Serum Pro-Brain Natriuretic Peptide: 19985 pg/mL (05-01 @ 06:35)    CARDIAC MARKERS:   01 May 2018 06:35 Troponin x     / Creatine Kinase 386 U/L /  CKMB 23.6 ng/mL / CPK Mass Assay % 6.1 %   01 May 2018 01:37 Troponin x     / Creatine Kinase 408 U/L /  CKMB 20.7 ng/mL / CPK Mass Assay % 5.1 %      TELEMETRY: 	Sinus tachycardia    ECG:  	Sinus tach with LBBB  RADIOLOGY:		  	< from: Xray Chest 1 View- PORTABLE-Urgent (05.01.18 @ 01:38) >  FINDINGS:     There is a cardiac conduction device overlying the left axilla with   intact leads to the heart.    Lungs: The lungs are clear.  Heart: The heart is normal in size.  Mediastinum: The mediastinum is within normal limits.    IMPRESSION:    Clear lungs.    < end of copied text >

## 2018-05-01 NOTE — CONSULT NOTE ADULT - SUBJECTIVE AND OBJECTIVE BOX
Date of Consult:    CHIEF COMPLAINT:    HISTORY OF PRESENT ILLNESS:    Paige Franklin is a 64 y/o M with CAD s/p MI and cardiac arrests, PCI x 10 in past, HFrEF/NICM (biventricular dysfunction, EF 18%, severe MR, LVIDd 6.6 cmc) s/p AICD ([employer]), HLD who presented to [facility] c/o difficulty breathing and abdominal pain with wretching which they attributed to constipation.  Per patient’s spouse, he has been intermittent non-radiating chest pain x 1 month but refused to come to the ED.  Most of the info is obtained from patient’s spouse.  He has been chronically c/o dizziness and left arm pain since ICD implantation.  Patient has been off his medications (ASA, Plavix, and Lipitor) x 1 month and Entresto 8 months ago due to lack of insurance.  Denies CP, DAVIS, orthopnea, weight gain, fever, sick contacts, or recent travels.  Of note, patient has had difficulty remembering events since his cardiac arrest in 2005, according to his neurologist due to anoxia per spouse.  In [facility] ED, EKG showed LBBB and elevated cardiac enzymes (WPR=121, CK/MB=20.7, Trop I=27.5).  Transferred to [facility] for concern of STEMI.  CTA of the chest, abdomen, and pelvis done showing no evidence of aortic dissection; 3.7 cm infrarenal abdominal aortic aneurysm with partial thrombotic plaque.  No central pulmonary embolism.  Small bilateral pleural effusions.  CT head showed no intracranial hemorrhage, mass effect or large acute cortical infarct.    Patient underwent LHC and was transferred to CCU.     Allergies    No Known Allergies    Intolerances    MEDICATIONS:  heparin  Infusion.  Unit(s)/Hr IV Continuous <Continuous>  heparin  Injectable 5300 Unit(s) IV Push every 6 hours PRN  ticagrelor 90 milliGRAM(s) Oral two times a day  influenza   Vaccine 0.5 milliLiter(s) IntraMuscular once    PAST MEDICAL & SURGICAL HISTORY:  Myocardial infarction, unspecified MI type, unspecified artery  Coronary artery disease involving native coronary artery of native heart without angina pectoris  AAA (abdominal aortic aneurysm) without rupture  Heart failure of unknown etiology  High cholesterol  AICD (automatic cardioverter/defibrillator) present    FAMILY HISTORY:  No pertinent family history in first degree relatives    SOCIAL HISTORY:    [ ] Non-smoker  [ ] Smoker  [ ] Alcohol    REVIEW OF SYSTEMS:  See HPI. Otherwise, 10 point ROS done and otherwise negative.    PHYSICAL EXAM:  T(C): 36.3 (05-01-18 @ 12:15), Max: 36.7 (05-01-18 @ 01:12)  HR: 100 (05-01-18 @ 12:18) (96 - 127)  BP: 126/66 (05-01-18 @ 12:15) (69/59 - 126/66)  RR: 20 (05-01-18 @ 12:15) (14 - 33)  SpO2: 98% (05-01-18 @ 12:18) (73% - 100%)  Wt(kg): --  I&O's Summary    30 Apr 2018 07:01  -  01 May 2018 07:00  --------------------------------------------------------  IN: 20 mL / OUT: 0 mL / NET: 20 mL    01 May 2018 07:01  -  01 May 2018 12:46  --------------------------------------------------------  IN: 17.5 mL / OUT: 250 mL / NET: -232.5 mL    Appearance: Normal	  HEENT:   Normal oral mucosa, PERRL, EOMI	  Lymphatic: No lymphadenopathy  Cardiovascular: Normal S1 S2, No JVD, No murmurs, No edema  Respiratory: Lungs clear to auscultation	  Psychiatry: A & O x 3, Mood & affect appropriate  Gastrointestinal:  Soft, Non-tender, + BS	  Skin: No rashes, No ecchymoses, No cyanosis	  Neurologic: Non-focal  Extremities: Normal range of motion, No clubbing, cyanosis or edema  Vascular: Peripheral pulses palpable 2+ bilaterally    LABS:	 	    CBC Full  -  ( 01 May 2018 06:35 )  WBC Count : 15.3 K/uL  Hemoglobin : 12.7 g/dL  Hematocrit : 38.4 %  Platelet Count - Automated : 204 K/uL  Mean Cell Volume : 64.8 fl  Mean Cell Hemoglobin : 21.4 pg  Mean Cell Hemoglobin Concentration : 33.0 gm/dL  Auto Neutrophil # : x  Auto Lymphocyte # : x  Auto Monocyte # : x  Auto Eosinophil # : x  Auto Basophil # : x  Auto Neutrophil % : x  Auto Lymphocyte % : x  Auto Monocyte % : x  Auto Eosinophil % : x  Auto Basophil % : x    05-01    134<L>  |  100  |  42<H>  ----------------------------<  127<H>  4.7   |  11<L>  |  1.80<H>  05-01    141  |  106  |  39<H>  ----------------------------<  124<H>  4.4   |  18<L>  |  2.24<H>    Ca    8.5      01 May 2018 06:35  Ca    8.4<L>      01 May 2018 01:37  Phos  4.4     05-01  Mg     2.2     05-01    TPro  6.4  /  Alb  3.6  /  TBili  2.5<H>  /  DBili  x   /  AST  376<H>  /  ALT  302<H>  /  AlkPhos  104  05-01  TPro  6.9  /  Alb  3.7  /  TBili  2.6<H>  /  DBili  x   /  AST  367<H>  /  ALT  288<H>  /  AlkPhos  129<H>  05-01    proBNP: Serum Pro-Brain Natriuretic Peptide: 26269 pg/mL (05-01 @ 06:35)    Lipid Profile:   HgA1c:   TSH:     CARDIAC MARKERS:  Troponin I, Serum: 27.500 ng/mL (05-01 @ 01:37)    TELEMETRY: 	    ECG:  	  RADIOLOGY:  OTHER: 	    PREVIOUS DIAGNOSTIC TESTING:    [ ] Echocardiogram:  < from: TTE with Doppler (w/Cont) (05.01.18 @ 06:37) >  Dimensions:    Normal Values:  LA:     4.2    2.0 - 4.0 cm  Ao:     2.8    2.0 - 3.8 cm  SEPTUM: 1.0    0.6 - 1.2 cm  PWT:    0.9    0.6 - 1.1 cm  LVIDd:  6.5    3.0 - 5.6 cm  LVIDs:  6.0    1.8 - 4.0 cm  Derived variables:  LVMI: 134 g/m2  RWT: 0.27  Fractional short: 8 %  EF (Teicholtz): 17 %  ------------------------------------------------------------------------  Observations:  Mitral Valve: Tethered mitral valve leaflets with normal  opening. Severe mitral regurgitation.  Aortic Valve/Aorta: Aortic valve not well visualized;  appears calcified. Mean transaortic valve gradient equals 3  mm Hg, aortic valve velocity time integral equals 13 cm,  estimated aortic valve area equals 2.4 sqcm. Minimal aortic  regurgitation.  Peak left ventricular outflow tract  gradient equals 2 mm Hg, mean gradient is equal to 1 mm Hg,  LVOT velocity time integral equals 9 cm.  Aortic Root: 2.8 cm.  LVOT diameter: 2.1 cm.  Left Atrium: Normal left atrium.  LA volume index = 33  cc/m2.  Left Ventricle: Severe left ventricular systolic  dysfunction.  Endocardial visualization enhanced with  intravenous injection of echo contrast (Definity). No left  ventricular thrombus. Severe left ventricular enlargement.  Severe diastolic dysfunction (Stage III).  Right Heart: Right atrial enlargement. Right ventricular  enlargement with decreased right ventricular systolic  function.  A device wire is noted in the right heart.  Normal tricuspid valve. Mild tricuspid regurgitation.  Normal pulmonic valve. Minimal pulmonic regurgitation.  Pericardium/Pleura: Normal pericardium with no pericardial  effusion.  Hemodynamic: Estimated right atrial pressure is 8 mm Hg.  Estimated right ventricular systolic pressure equals 42mm  Hg, assuming right atrial pressure equals 8 mm Hg,  consistent with mild pulmonary hypertension.  ------------------------------------------------------------------------  Conclusions:  1. Severe mitral regurgitation.  2. Severe left ventricular enlargement.  3. Severe left ventricular systolic dysfunction.  Endocardial visualization enhanced with intravenous  injection of echo contrast (Definity). No left ventricular  thrombus.  4. Severe diastolic dysfunction (Stage III).  5. Right atrial enlargement.  6. Right ventricular enlargement with decreased right  ventricular systolic function.  A device wire is noted in  the right heart.  *** No previous Echo exam.    < end of copied text >    [ ]  Catheterization:  < from: CT Angio Chest w/ IV Cont (05.01.18 @ 02:25) >  IMPRESSION:  No evidence of aortic dissection. 3.7 cm infrarenal abdominal aortic   aneurysm with partial thrombotic plaque. Multifocal areas of   atherosclerotic plaque. No central pulmonary embolism. Dilated main   pulmonary artery suggests pulmonary hypertension.  Small bilateral pleural effusions. Cardiomegaly. Pacemaker/AICD in place.  Diverticulosis.    < end of copied text >    [ ] Stress Test:  	  	  ASSESSMENT/PLAN: 	    Tristin Martinez MD Date of Consult:    CHIEF COMPLAINT:    HISTORY OF PRESENT ILLNESS:    Paige Franklin is a 64 y/o M with CAD s/p MI and cardiac arrests, PCI x 10 in past, HFrEF/NICM (biventricular dysfunction, EF 18%, severe MR, LVIDd 6.6 cmc) s/p AICD ([employer]), HLD who presented to [facility] c/o difficulty breathing and abdominal pain with wretching, ongoing for a week or so.  Per patient’s spouse, he has been intermittent non-radiating chest pain x 1 month but refused to come to the ED.  Most of the info is obtained from patient’s spouse and medical record.  He has been chronically c/o dizziness and left arm pain since ICD implantation.  Patient has been off his medications (ASA, Plavix, and Lipitor) x 1 month and Entresto 8 months ago due to lack of insurance.  Denies CP, DAVIS, orthopnea, weight gain, fever, sick contacts, or recent travels.  Of note, patient has had difficulty remembering events since his cardiac arrest in 2005, according to his neurologist due to anoxia per spouse.  In [facility] ED, EKG showed LBBB (unclear old or new) and elevated cardiac enzymes (KQQ=046, CK/MB=20.7, Trop I=27.5).  Transferred to [facility] for concern of STEMI.  CTA of the chest, abdomen, and pelvis done showing no evidence of aortic dissection; 3.7 cm infrarenal abdominal aortic aneurysm with partial thrombotic plaque.  No central pulmonary embolism.  Small bilateral pleural effusions.  CT head showed no intracranial hemorrhage, mass effect or large acute cortical infarct.    Patient underwent LHC and was transferred to CCU.     Allergies    No Known Allergies    Intolerances    MEDICATIONS:  heparin  Infusion.  Unit(s)/Hr IV Continuous <Continuous>  heparin  Injectable 5300 Unit(s) IV Push every 6 hours PRN  ticagrelor 90 milliGRAM(s) Oral two times a day  influenza   Vaccine 0.5 milliLiter(s) IntraMuscular once    PAST MEDICAL & SURGICAL HISTORY:  Myocardial infarction, unspecified MI type, unspecified artery  Coronary artery disease involving native coronary artery of native heart without angina pectoris  AAA (abdominal aortic aneurysm) without rupture  Heart failure of unknown etiology  High cholesterol  AICD (automatic cardioverter/defibrillator) present    FAMILY HISTORY:  No pertinent family history in first degree relatives    SOCIAL HISTORY:    [ ] Non-smoker  [ ] Smoker  [ ] Alcohol    REVIEW OF SYSTEMS:  See HPI. Otherwise, 10 point ROS done and otherwise negative.    PHYSICAL EXAM:  T(C): 36.3 (05-01-18 @ 12:15), Max: 36.7 (05-01-18 @ 01:12)  HR: 100 (05-01-18 @ 12:18) (96 - 127)  BP: 126/66 (05-01-18 @ 12:15) (69/59 - 126/66)  RR: 20 (05-01-18 @ 12:15) (14 - 33)  SpO2: 98% (05-01-18 @ 12:18) (73% - 100%)  Wt(kg): --  I&O's Summary    30 Apr 2018 07:01  -  01 May 2018 07:00  --------------------------------------------------------  IN: 20 mL / OUT: 0 mL / NET: 20 mL    01 May 2018 07:01  -  01 May 2018 12:46  --------------------------------------------------------  IN: 17.5 mL / OUT: 250 mL / NET: -232.5 mL    Appearance: Normal	  HEENT:   Normal oral mucosa, PERRL, EOMI, on bipap 	  Lymphatic: No lymphadenopathy  Cardiovascular: Normal S1 S2, + JVP, holosystolic murmer   Respiratory: dull at bases  Psychiatry: A & O x 3, Mood & affect appropriate  Gastrointestinal:  Soft, Non-tender, + BS	  Skin: No rashes, No ecchymoses, No cyanosis	  Neurologic: Non-focal  Extremities: cool extremities   Vascular: Peripheral pulses palpable 2+ bilaterally    LABS:	 	    CBC Full  -  ( 01 May 2018 06:35 )  WBC Count : 15.3 K/uL  Hemoglobin : 12.7 g/dL  Hematocrit : 38.4 %  Platelet Count - Automated : 204 K/uL  Mean Cell Volume : 64.8 fl  Mean Cell Hemoglobin : 21.4 pg  Mean Cell Hemoglobin Concentration : 33.0 gm/dL  Auto Neutrophil # : x  Auto Lymphocyte # : x  Auto Monocyte # : x  Auto Eosinophil # : x  Auto Basophil # : x  Auto Neutrophil % : x  Auto Lymphocyte % : x  Auto Monocyte % : x  Auto Eosinophil % : x  Auto Basophil % : x    05-01    134<L>  |  100  |  42<H>  ----------------------------<  127<H>  4.7   |  11<L>  |  1.80<H>  05-01    141  |  106  |  39<H>  ----------------------------<  124<H>  4.4   |  18<L>  |  2.24<H>    Ca    8.5      01 May 2018 06:35  Ca    8.4<L>      01 May 2018 01:37  Phos  4.4     05-01  Mg     2.2     05-01    TPro  6.4  /  Alb  3.6  /  TBili  2.5<H>  /  DBili  x   /  AST  376<H>  /  ALT  302<H>  /  AlkPhos  104  05-01  TPro  6.9  /  Alb  3.7  /  TBili  2.6<H>  /  DBili  x   /  AST  367<H>  /  ALT  288<H>  /  AlkPhos  129<H>  05-01    proBNP: Serum Pro-Brain Natriuretic Peptide: 80940 pg/mL (05-01 @ 06:35)    Lipid Profile:   HgA1c:   TSH:     CARDIAC MARKERS:  Troponin I, Serum: 27.500 ng/mL (05-01 @ 01:37)    TELEMETRY: 	    ECG:  	Sinus tachycardia with LBBB at QRS of 134.   RADIOLOGY:  OTHER: 	    PREVIOUS DIAGNOSTIC TESTING:    [ ] Echocardiogram:  < from: TTE with Doppler (w/Cont) (05.01.18 @ 06:37) >  Dimensions:    Normal Values:  LA:     4.2    2.0 - 4.0 cm  Ao:     2.8    2.0 - 3.8 cm  SEPTUM: 1.0    0.6 - 1.2 cm  PWT:    0.9    0.6 - 1.1 cm  LVIDd:  6.5    3.0 - 5.6 cm  LVIDs:  6.0    1.8 - 4.0 cm  Derived variables:  LVMI: 134 g/m2  RWT: 0.27  Fractional short: 8 %  EF (Teicholtz): 17 %  ------------------------------------------------------------------------  Observations:  Mitral Valve: Tethered mitral valve leaflets with normal  opening. Severe mitral regurgitation.  Aortic Valve/Aorta: Aortic valve not well visualized;  appears calcified. Mean transaortic valve gradient equals 3  mm Hg, aortic valve velocity time integral equals 13 cm,  estimated aortic valve area equals 2.4 sqcm. Minimal aortic  regurgitation.  Peak left ventricular outflow tract  gradient equals 2 mm Hg, mean gradient is equal to 1 mm Hg,  LVOT velocity time integral equals 9 cm.  Aortic Root: 2.8 cm.  LVOT diameter: 2.1 cm.  Left Atrium: Normal left atrium.  LA volume index = 33  cc/m2.  Left Ventricle: Severe left ventricular systolic  dysfunction.  Endocardial visualization enhanced with  intravenous injection of echo contrast (Definity). No left  ventricular thrombus. Severe left ventricular enlargement.  Severe diastolic dysfunction (Stage III).  Right Heart: Right atrial enlargement. Right ventricular  enlargement with decreased right ventricular systolic  function.  A device wire is noted in the right heart.  Normal tricuspid valve. Mild tricuspid regurgitation.  Normal pulmonic valve. Minimal pulmonic regurgitation.  Pericardium/Pleura: Normal pericardium with no pericardial  effusion.  Hemodynamic: Estimated right atrial pressure is 8 mm Hg.  Estimated right ventricular systolic pressure equals 42mm  Hg, assuming right atrial pressure equals 8 mm Hg,  consistent with mild pulmonary hypertension.  ------------------------------------------------------------------------  Conclusions:  1. Severe mitral regurgitation.  2. Severe left ventricular enlargement.  3. Severe left ventricular systolic dysfunction.  Endocardial visualization enhanced with intravenous  injection of echo contrast (Definity). No left ventricular  thrombus.  4. Severe diastolic dysfunction (Stage III).  5. Right atrial enlargement.  6. Right ventricular enlargement with decreased right  ventricular systolic function.  A device wire is noted in  the right heart.  *** No previous Echo exam.    < end of copied text >    [ ]  Catheterization:  < from: CT Angio Chest w/ IV Cont (05.01.18 @ 02:25) >  IMPRESSION:  No evidence of aortic dissection. 3.7 cm infrarenal abdominal aortic   aneurysm with partial thrombotic plaque. Multifocal areas of   atherosclerotic plaque. No central pulmonary embolism. Dilated main   pulmonary artery suggests pulmonary hypertension.  Small bilateral pleural effusions. Cardiomegaly. Pacemaker/AICD in place.  Diverticulosis.    < end of copied text >    [ ] Stress Test:  	  	  ASSESSMENT/PLAN: 	    Tristin Martinez MD

## 2018-05-01 NOTE — PATIENT PROFILE ADULT. - CAREGIVER ADDRESS
[id number] [facility], [place] [address]

## 2018-05-01 NOTE — ED PROVIDER NOTE - OBJECTIVE STATEMENT
Pertinent PMH/PSH/FHx/SHx and Review of Systems contained within:    62yo M w PMH of CHF, s/p AICD placement presents to ED c/o CP Pertinent PMH/PSH/FHx/SHx and Review of Systems contained within:    62yo M w PMH of CHF, s/p AICD placement presents to ED c/o CP.  Family states pt was c/o constipation sx yesterday, then had increasing discomfort Pertinent PMH/PSH/FHx/SHx and Review of Systems contained within:    62yo M w PMH of CHF, s/p AICD placement presents to ED c/o CP.  Family states pt was c/o constipation sx yesterday, then had increasing discomfort w nausea, vomiting w CP & SOB today.  Family brought pt to ED, pt unable to ambulate to ED, seen by EMS & brought in.    No fever/chills, No photophobia/eye pain/changes in vision, No ear pain/sore throat/dysphagia, +chest pain, +SOB, No abdominal pain, No neck/back pain, no rash, no changes in neurological status/function. Pertinent PMH/PSH/FHx/SHx and Review of Systems contained within:    62yo M w PMH of HL, CHF, previous MI s/p AICD placement, on metoprolol, plavix presents to ED c/o CP.  Family states pt was c/o constipation sx yesterday, then had increasing discomfort w nausea, vomiting w CP & SOB today.  Family brought pt to ED, pt unable to ambulate to ED, seen by EMS & brought in.  Pt states CP is anterior L chest, no radiation.  Pt also c/o HA.  Denies syncope.    No fever/chills, No photophobia/eye pain/changes in vision, No ear pain/sore throat/dysphagia, +chest pain, +SOB, No abdominal pain, No neck/back pain, no rash, no changes in neurological status/function.

## 2018-05-01 NOTE — CONSULT NOTE ADULT - SUBJECTIVE AND OBJECTIVE BOX
History of Present Illness:  HPI:  This is a 64 y/o M with CAD s/p MI and cardiac arrests, remote stents x 10 (2005), HFrEF s/p AICD ([employer]) with battery change 3 years ago, AAA, gastic ulcers, HLD, and BPH presented to [facility] c/o difficulty breathing and abdominal pain with wretching which they attributed to constipation.  Per patient’s spouse, he has been intermittent non-radiating chest pain x 1 month but refused to come to the ED.  Most of the info is obtained from patient’s spouse.  He has been chronically c/o dizziness and left arm pain since ICD implantation.  Patient has been off his medications (ASA, Plavix, and Lipitor) x 1 month and Entresto 8 months ago due to lack of insurance.  Denies CP, DAVIS, orthopnea, weight gain, fever, sick contacts, or recent travels.  Of note, patient has had difficulty remembering events since his cardiac arrest in 2005, according to his neurologist due to anoxia per spouse.  In OSH ED, EKG showed LBBB and elevated cardiac enzymes (ITT=937, CK/MB=20.7, Trop I=27.5).  Transferred to [facility] for concern of STEMI.  CTA of the chest, abdomen, and pelvis done showing no evidence of aortic dissection; 3.7 cm infrarenal abdominal aortic aneurysm with partial thrombotic plaque.  No central pulmonary embolism.  Small bilateral pleural effusions.  CT head showed no intracranial hemorrhage, mass effect or large acute cortical infarct.    Upon arrival to PICU, patient is pain-free, denies difficulty breathing but c/o back pain, attributing it to the stretcher. (01 May 2018 05:16)   CT Surgery consulted for    Past Medical History  Myocardial infarction, unspecified MI type, unspecified artery  Coronary artery disease involving native coronary artery of native heart without angina pectoris  AAA (abdominal aortic aneurysm) without rupture  Hypotension, unspecified hypotension type  Heart failure of unknown etiology  High cholesterol      Past Surgical History  AICD (automatic cardioverter/defibrillator) present      MEDICATIONS  (STANDING):  heparin  Infusion.  Unit(s)/Hr (10 mL/Hr) IV Continuous <Continuous>  influenza   Vaccine 0.5 milliLiter(s) IntraMuscular once  ticagrelor 90 milliGRAM(s) Oral two times a day    MEDICATIONS  (PRN):  heparin  Injectable 5300 Unit(s) IV Push every 6 hours PRN For aPTT less than 40      Vital Signs Last 24 Hrs  T(C): 36.3 (05-01-18 @ 12:15), Max: 36.7 (05-01-18 @ 01:12)  T(F): 97.4 (05-01-18 @ 12:15), Max: 98 (05-01-18 @ 01:12)  HR: 100 (05-01-18 @ 12:18) (96 - 127)  BP: 126/66 (05-01-18 @ 12:15) (69/59 - 126/66)  RR: 20 (05-01-18 @ 12:15) (14 - 33)  SpO2: 98% (05-01-18 @ 12:18) (73% - 100%)           Height (cm): 170.18 (01 May 2018 05:20)  Weight (kg): 86.9 (01 May 2018 05:20)  BMI (kg/m2): 30 (01 May 2018 05:20)      Allergies: No Known Allergies      SOCIAL HISTORY:  Smoker: [ ] Yes  [ ] No        PACK YEARS:                         WHEN QUIT?  ETOH use: [ ] Yes  [ ] No              FREQUENCY / QUANTITY:  Ilicit Drug use:  [ ] Yes  [ ] No    FAMILY HISTORY:  No pertinent family history in first degree relatives      Review of Systems  GENERAL:  no weakness, fatigue, fevers or chills  NEURO: no dizziness, numbness, tingling or weakness  SKIN: no itching, burning, rashes, or lesions   HEENT: no visual changes;  no headache, no vertigo, no recent colds  RESPIRATORY: no shortness of breath, no cough, sputum, wheezing, hemoptysis;   CARDIOVASCULAR:  no chest pain,  or palpitations  GI: no abd pain. no N/V/D.  PERIPHERAL VASCULAR: no swelling, no tenderness, no erythema, no varicose veins.     PHYSICAL EXAM  General: Well nourished, well developed, NAD.                                              Neuro: Normal exam oriented to person/place & time with no focal motor or sensory  deficits.                    Eyes: Normal exam of conjunctiva & lids, pupils equally reactive.   ENT: Normal exam of nasal/oral mucosa with absence of cyanosis.   Neck: Normal exam of jugular veins, trachea & thyroid.   Chest: Normal lung exam with good air movement absence of wheezes, rales, or rhonchi:                                                                          CV:  Auscultation: normal S1S2, Irregular   Murmurs   Carotids: No Bruits[ ]  Abdominal Aorta: normal [ ] nonpalpable[ ]                                                                         GI: Normal exam of abdomen with no noted masses or tenderness. +BSx4Q                                                                                            Extremities: Normal no evidence of cyanosis or deformity, Edema:   Lower Extremity Pulses: Right[ ] Left[ ]Varicosities[ ]  SKIN : Normal exam to inspection & palpation.                                                           LABS:                        12.7   15.3  )-----------( 204      ( 01 May 2018 06:35 )             38.4     05-01    134<L>  |  100  |  42<H>  ----------------------------<  127<H>  4.7   |  11<L>  |  1.80<H>    Ca    8.5      01 May 2018 06:35  Phos  4.4     05-01  Mg     2.2     05-01    TPro  6.4  /  Alb  3.6  /  TBili  2.5<H>  /  DBili  x   /  AST  376<H>  /  ALT  302<H>  /  AlkPhos  104  05-01    PT/INR - ( 01 May 2018 06:35 )   PT: 19.2 sec;   INR: 1.74 ratio         PTT - ( 01 May 2018 06:35 )  PTT:> 200 sec      Cardiac Cath:    TTE / ALPA: History of Present Illness:  This is a 62 y/o M with CAD s/p MI and cardiac arrests, remote stents x 10 (2005), HFrEF s/p AICD ([employer]) with battery change 3 years ago, AAA, gastic ulcers, HLD, and BPH presented to [facility] c/o difficulty breathing and abdominal pain with wretching which they attributed to constipation.  Per patient’s spouse, he has been intermittent non-radiating chest pain x 1 month but refused to come to the ED.  Most of the info is obtained from patient’s spouse.  He has been chronically c/o dizziness and left arm pain since ICD implantation.  Patient has been off his medications (ASA, Plavix, and Lipitor) x 1 month and Entresto 8 months ago due to lack of insurance.  Denies CP, DAVIS, orthopnea, weight gain, fever, sick contacts, or recent travels.  Of note, patient has had difficulty remembering events since his cardiac arrest in 2005, according to his neurologist due to anoxia per spouse.  In OSH ED, EKG showed LBBB and elevated cardiac enzymes (YBK=363, CK/MB=20.7, Trop I=27.5).  Transferred to [facility] for concern of STEMI.  CTA of the chest, abdomen, and pelvis done showing no evidence of aortic dissection; 3.7 cm infrarenal abdominal aortic aneurysm with partial thrombotic plaque.  No central pulmonary embolism.  Small bilateral pleural effusions.  CT head showed no intracranial hemorrhage, mass effect or large acute cortical infarct. Upon arrival to PICU, patient is pain-free, denies difficulty breathing but c/o back pain, attributing it to the stretcher. CT Surgery consulted for severe multivessel CAD on cardiac cath today and severe LV systolic dysfunction for LVAD evaluation.    Past Medical History  Myocardial infarction, unspecified MI type, unspecified artery  Coronary artery disease involving native coronary artery of native heart without angina pectoris  AAA (abdominal aortic aneurysm) without rupture  Hypotension, unspecified hypotension type  Heart failure of unknown etiology  High cholesterol      Past Surgical History  AICD (automatic cardioverter/defibrillator) present      MEDICATIONS  (STANDING):  heparin  Infusion.  Unit(s)/Hr (10 mL/Hr) IV Continuous <Continuous>  influenza   Vaccine 0.5 milliLiter(s) IntraMuscular once  ticagrelor 90 milliGRAM(s) Oral two times a day    MEDICATIONS  (PRN):  heparin  Injectable 5300 Unit(s) IV Push every 6 hours PRN For aPTT less than 40      Vital Signs Last 24 Hrs  T(C): 36.3 (05-01-18 @ 12:15), Max: 36.7 (05-01-18 @ 01:12)  T(F): 97.4 (05-01-18 @ 12:15), Max: 98 (05-01-18 @ 01:12)  HR: 100 (05-01-18 @ 12:18) (96 - 127)  BP: 126/66 (05-01-18 @ 12:15) (69/59 - 126/66)  RR: 20 (05-01-18 @ 12:15) (14 - 33)  SpO2: 98% (05-01-18 @ 12:18) (73% - 100%)           Height (cm): 170.18 (01 May 2018 05:20)  Weight (kg): 86.9 (01 May 2018 05:20)  BMI (kg/m2): 30 (01 May 2018 05:20)      Allergies: No Known Allergies      SOCIAL HISTORY:  Smoker: [ ] Yes  [ ] No        PACK YEARS:                         WHEN QUIT?  ETOH use: [ ] Yes  [ ] No              FREQUENCY / QUANTITY:  Ilicit Drug use:  [ ] Yes  [ ] No    FAMILY HISTORY:  No pertinent family history in first degree relatives      Review of Systems  GENERAL:  no weakness, fatigue, fevers or chills  NEURO: no dizziness, numbness, tingling or weakness  SKIN: no itching, burning, rashes, or lesions   HEENT: no visual changes;  no headache, no vertigo, no recent colds  RESPIRATORY: no shortness of breath, no cough, sputum, wheezing, hemoptysis;   CARDIOVASCULAR:  no chest pain,  or palpitations  GI: no abd pain. no N/V/D.  PERIPHERAL VASCULAR: no swelling, no tenderness, no erythema, no varicose veins.     PHYSICAL EXAM  General: Well nourished, well developed, NAD.                                              Neuro: Normal exam oriented to person/place & time with no focal motor or sensory  deficits.                    Eyes: Normal exam of conjunctiva & lids, pupils equally reactive.   ENT: Normal exam of nasal/oral mucosa with absence of cyanosis.   Neck: Normal exam of jugular veins, trachea & thyroid.   Chest: Normal lung exam with good air movement absence of wheezes, rales, or rhonchi:                                                                          CV:  Auscultation: normal S1S2, Irregular   Murmurs   Carotids: No Bruits[ ]  Abdominal Aorta: normal [ ] nonpalpable[ ]                                                                         GI: Normal exam of abdomen with no noted masses or tenderness. +BSx4Q                                                                                            Extremities: Normal no evidence of cyanosis or deformity, Edema:   Lower Extremity Pulses: Right[ ] Left[ ]Varicosities[ ]  SKIN : Normal exam to inspection & palpation.                                                           LABS:                        12.7   15.3  )-----------( 204      ( 01 May 2018 06:35 )             38.4     05-01    134<L>  |  100  |  42<H>  ----------------------------<  127<H>  4.7   |  11<L>  |  1.80<H>    Ca    8.5      01 May 2018 06:35  Phos  4.4     05-01  Mg     2.2     05-01    TPro  6.4  /  Alb  3.6  /  TBili  2.5<H>  /  DBili  x   /  AST  376<H>  /  ALT  302<H>  /  AlkPhos  104  05-01    PT/INR - ( 01 May 2018 06:35 )   PT: 19.2 sec;   INR: 1.74 ratio         PTT - ( 01 May 2018 06:35 )  PTT:> 200 sec      Cardiac Cath:    TTE / ALPA: History of Present Illness:  This is a 62 y/o M with PMHx of CAD s/p MI and cardiac arrests, remote stents x 10 (2005), HFrEF s/p AICD ([employer]) with battery change 3 years ago, AAA, gastic ulcers, HLD, and BPH presented to [facility] c/o difficulty breathing and abdominal pain with wretching which they attributed to constipation.  Per patient’s spouse, he has been intermittent non-radiating chest pain x 1 month but refused to come to the ED.  Most of the info is obtained from patient’s spouse.  He has been chronically c/o dizziness and left arm pain since ICD implantation.  Patient has been off his medications (ASA, Plavix, and Lipitor) x 1 month and Entresto 8 months ago due to lack of insurance.  Denies CP, DAVIS, orthopnea, weight gain, fever, sick contacts, or recent travels.  Of note, patient has had difficulty remembering events since his cardiac arrest in 2005, according to his neurologist due to anoxia per spouse.  In OSH ED, EKG showed LBBB and elevated cardiac enzymes (JWB=737, CK/MB=20.7, Trop I=27.5).  Transferred to [facility] for concern of STEMI.  CTA of the chest, abdomen, and pelvis done showing no evidence of aortic dissection; 3.7 cm infrarenal abdominal aortic aneurysm with partial thrombotic plaque.  No central pulmonary embolism.  Small bilateral pleural effusions.  CT head showed no intracranial hemorrhage, mass effect or large acute cortical infarct. Upon arrival to PICU, patient is pain-free, denies difficulty breathing but c/o back pain, attributing it to the stretcher. CT Surgery consulted for severe multivessel CAD on cardiac cath today and severe LV systolic dysfunction for LVAD evaluation.    Past Medical History  Myocardial infarction, unspecified MI type, unspecified artery  Coronary artery disease involving native coronary artery of native heart without angina pectoris  AAA (abdominal aortic aneurysm) without rupture  Hypotension, unspecified hypotension type  Heart failure of unknown etiology  High cholesterol      Past Surgical History  AICD (automatic cardioverter/defibrillator) present      MEDICATIONS  (STANDING):  heparin  Infusion.  Unit(s)/Hr (10 mL/Hr) IV Continuous <Continuous>  influenza   Vaccine 0.5 milliLiter(s) IntraMuscular once  ticagrelor 90 milliGRAM(s) Oral two times a day      Vital Signs Last 24 Hrs  T(C): 36.3 (05-01-18 @ 12:15), Max: 36.7 (05-01-18 @ 01:12)  T(F): 97.4 (05-01-18 @ 12:15), Max: 98 (05-01-18 @ 01:12)  HR: 100 (05-01-18 @ 12:18) (96 - 127)  BP: 126/66 (05-01-18 @ 12:15) (69/59 - 126/66)  RR: 20 (05-01-18 @ 12:15) (14 - 33)  SpO2: 98% (05-01-18 @ 12:18) (73% - 100%)           Height (cm): 170.18    Weight (kg): 86.9     BMI (kg/m2): 30      (01 May 2018 05:20)      Allergies: No Known Allergies      FAMILY HISTORY:  No pertinent family history in first degree relatives      Review of Systems  GENERAL:  no weakness, fatigue, fevers or chills  NEURO: no dizziness, numbness, tingling or weakness  SKIN: no itching, burning, rashes, or lesions   HEENT: no visual changes;  no headache, no vertigo, no recent colds  RESPIRATORY: no shortness of breath, no cough, sputum, wheezing, hemoptysis;   CARDIOVASCULAR:  no chest pain,  or palpitations  GI: no abd pain. no N/V/D.  PERIPHERAL VASCULAR: no swelling, no tenderness, no erythema, no varicose veins.     PHYSICAL EXAM  General: Well nourished, well developed, NAD.                                              Neuro: Normal exam oriented to person/place & time with no focal motor or sensory  deficits.                    Eyes: Normal exam of conjunctiva & lids, pupils equally reactive.   ENT: Normal exam of nasal/oral mucosa with absence of cyanosis.   Neck: Normal exam of jugular veins, trachea & thyroid.   Chest: Normal lung exam with good air movement absence of wheezes, rales, or rhonchi:                                                                          CV:  Auscultation: normal S1S2, RRR                                                              GI: Normal exam of abdomen with no noted masses or tenderness. +BSx4Q                                                                                            Extremities: Normal no evidence of cyanosis or deformity, Edema: +1. +RFA IABP & RFV [place] Jaleesa catheter  Lower Extremity Pulses: Right[+2DP] Left[+2DP]   SKIN : Normal exam to inspection & palpation.                                                           LABS:                        12.7   15.3  )-----------( 204      ( 01 May 2018 06:35 )             38.4     134<L>  |  100  |  42<H>  ----------------------------<  127<H>  4.7   |  11<L>  |  1.80<H>    TPro  6.4  /  Alb  3.6  /  TBili  2.5<H>  /  DBili  x   /  AST  376<H>  /  ALT  302<H>  /  AlkPhos  104  05-01    PT/INR - ( 01 May 2018 06:35 )   PT: 19.2 sec;   INR: 1.74 ratio   PTT:> 200 sec    Cardiac Cath 5/1: Prelim LM 60%, LAD severe diffuse dx, Cx-severe dx, mid % stenosis.     TTE 5/1: EF17%, severe MR, min AR, severe LV ventricular systolic dysfunction. No LV thrombus, severe diastolic dysfunction Stage III, RV enlargement with decreased RV systolic function. Mild TR, min FL, no pericardial effusion, mild pulm HTN.

## 2018-05-01 NOTE — H&P ADULT - ASSESSMENT
This is a 64 y/o male with above medical and surgical history presented to [facility] c/o intermittent non-radiating CP x 1 month and difficulty breathing last night.  EKG showed LBBB (unsure if old or new) with elevated cardiac enzymes (RME=236, CK/MB=20.7, Trop I=27.5).  Transferred to [facility] for concern of ACS. This is a 62 y/o male with above medical and surgical history presented to [facility] c/o intermittent non-radiating CP x 1 month and difficulty breathing last night.  EKG showed LBBB (unsure if old or new) with elevated cardiac enzymes (RFE=987, CK/MB=20.7, Trop I=27.5).  Transferred to [facility] for concern of ACS.  On presentation, patient noted to have JANENE (creatinine = 2.24 and transaminitis (AST-367; ALT-288).

## 2018-05-01 NOTE — CONSULT NOTE ADULT - PROBLEM SELECTOR RECOMMENDATION 9
Heart failure following  Will require medical optimization with IV lasix Heart failure following  Continue lasix and primacor drip  Eventual LVAD placement when medically optimized per Dr. Retana  Continue cardiac surgery workup  Continue management per primary team, will follow.

## 2018-05-01 NOTE — PROGRESS NOTE ADULT - ASSESSMENT
64 y/o M with CAD s/p MI and cardiac arrests, remote stents x 10 (2005), HFrEF s/p AICD ([employer]) with battery change 3 years ago, AAA, gastic ulcers, HLD, and BPH admitted for NSTEMI c/b acute on chronic decompensated systolic heart failure.

## 2018-05-01 NOTE — CONSULT NOTE ADULT - ASSESSMENT
62 y/o M with PMHx of CAD s/p MI and cardiac arrests, remote stents x 10 (2005), HFrEF s/p AICD ([employer]), transferred to [facility] from OSH for management of NSTEMI. Pt with severe LV dysfunction (EF17%) & severe MR on TTE, now s/p IABP and PA catheter in cath lab and cardiac cath demonstrating triple vessel disease. Currently in CCU requiring BiPAP for acute pulmonary edema.

## 2018-05-01 NOTE — CONSULT NOTE ADULT - ATTENDING COMMENTS
Briefly, 62 y/o M with CAD s/p MI and cardiac arrests, ? anoxic brain injury with poor memory recall, PCI x 10 in past, HFrEF/NICM (biventricular dysfunction, EF 18%, severe MR, LVEDD 6.4 cm) s/p dual chamber AICD ([employer]), HLD who presented to [facility] c/o difficulty breathing and abdominal pain with wretching, ongoing for a week or so; recently ran out of insurance and had stopped taking medications for 1 month. Was admitted on 5/1 where he was volume overloaded given lasix as well as a dose of metoprolol 12.5 mg x1; went for RHC/LHC which showed significant CAD and elevated filling pressures, low CI (0.9) so IABP placed. Noted to have PAD as well. Started on lasix gtt and milrinone 0.125 which increased to 0.25 with CI improved to approx 2. Currently feels well; denies CP/SOB. On exam, elevated JVD approx 14 cm, RRR, S3 present, clear lungs anteriorly, cool on exam; IABP in place c/d/i. Labs reviewed WBC 14, BUN/Cr 39/2.24 (unknown baseline); AST/ALT 1359/1024 (previously 300s), Tbili 3.3 (uptrending), AG 10, lactate 6.2 (improved from 10), BNP 21k, troponin T 1.98 (uptrending). ABG 7.39/28/101/17. EKG with LBBB (unknown if old). CTA done on admission which was negative for PE but showed 3.7 cm infrarenal AAA. TTE reviewed LVEDD 6.5 cm, EF 17%, severe MR, mod RV dysfunction, RVSP 42. Overall stage D HF, NYHA class IV, INTERMACS 2 with volume overload.  - start lasix gtt at 10 mg/hr; goal CVP 8-10  - start milrinone 0.125; uptitrate according to mixed sats  - check labs frequently; keep K>4, Mg>2  - trend lactate  - LVAD/transplant eval launched

## 2018-05-01 NOTE — PROCEDURE NOTE - NSPOSTCAREGUIDE_GEN_A_CORE
Instructed patient/caregiver regarding signs and symptoms of infection/Care for catheter as per unit/ICU protocols/Keep the cast/splint/dressing clean and dry/Verbal/written post procedure instructions were given to patient/caregiver

## 2018-05-01 NOTE — PROGRESS NOTE ADULT - PROBLEM SELECTOR PLAN 1
Continue ASA, continue Brilinta 90mg bid  half dose atorvastatin 40mg due to transaminitis  -RHC/LHC urgently today  -f/u TTE

## 2018-05-01 NOTE — ED PROVIDER NOTE - PROGRESS NOTE DETAILS
Pt w CP & concerning EKG findings, unknown if LBBB new or old.  Discussed w [facility] CCU fellow, awaiting call back.

## 2018-05-01 NOTE — ED ADULT TRIAGE NOTE - CHIEF COMPLAINT QUOTE
Patient walked in via ambulance triage, transferrred to [facility], c/o chest pain abd difficulty breathing time of onset 0030 hours. Hx Pacemaker/AICD, heart failure, 7 stents, aneurysm

## 2018-05-01 NOTE — PROGRESS NOTE ADULT - SUBJECTIVE AND OBJECTIVE BOX
LVAD/ Transplant Coordinator:    May 1, 2018    Met with patient Mr. Paige Franklin, his wife Fanta and son Noah for approximately 60 minutes to discuss consent for LVAD/heart transplant evaluation.  Reviewed evaluation process including testing, labs, appointments, and consults with the transplant team.  Discussed surgical, medical, and psycho-social risks and benefits, selection process, [facility] waitlist information, and follow-up care. Patient and families questions were answered.  Consent for increased risk donor was also reviewed, patients wife (durable power of attorny) signed consent he was supplied copy for review.  Patient signed consent for evaluation for heart transplant.  He was supplied copy of consent, patient handbook, [facility] "Questions and Answers for transplant candidates regarding multiple listing and wait time transfer", along with contact phone number if he has any additional questions or needs.

## 2018-05-01 NOTE — ED PROVIDER NOTE - CARE PLAN
Principal Discharge DX:	Chest pain Principal Discharge DX:	Chest pain  Secondary Diagnosis:	ACS (acute coronary syndrome)

## 2018-05-01 NOTE — PROCEDURE NOTE - NSPROCDETAILS_GEN_ALL_CORE
positive blood return obtained via catheter/sutured in place/hemostasis with direct pressure, dressing applied/location identified, draped/prepped, sterile technique used, needle inserted/introduced/connected to a pressurized flush line/Seldinger technique/all materials/supplies accounted for at end of procedure/ultrasound guidance

## 2018-05-01 NOTE — PROGRESS NOTE ADULT - SUBJECTIVE AND OBJECTIVE BOX
Patient is a 63y old  Male who presents with a chief complaint of     HPI:  This is a 62 y/o M with CAD s/p MI and cardiac arrests, remote stents x 10 (2005), HFrEF s/p AICD ([employer]) with battery change 3 years ago, AAA, gastic ulcers, HLD, and BPH presented to [facility] c/o difficulty breathing and abdominal pain with wretching which they attributed to constipation.  Per patient’s spouse, he has been intermittent non-radiating chest pain x 1 month but refused to come to the ED.  Most of the info is obtained from patient’s spouse.  He has been chronically c/o dizziness and left arm pain since ICD implantation.  Patient has been off his medications (ASA, Plavix, and Lipitor) x 1 month and Entresto 8 months ago due to lack of insurance.  Denies CP, DAVIS, orthopnea, weight gain, fever, sick contacts, or recent travels.  Of note, patient has had difficulty remembering events since his cardiac arrest in 2005, according to his neurologist due to anoxia per spouse.  In OSH ED, EKG showed LBBB and elevated cardiac enzymes (WHV=366, CK/MB=20.7, Trop I=27.5).  Transferred to [facility] for concern of STEMI.  CTA of the chest, abdomen, and pelvis done showing no evidence of aortic dissection; 3.7 cm infrarenal abdominal aortic aneurysm with partial thrombotic plaque.  No central pulmonary embolism.  Small bilateral pleural effusions.  CT head showed no intracranial hemorrhage, mass effect or large acute cortical infarct.    Upon arrival to PICU, patient is pain-free, denies difficulty breathing but c/o back pain, attributing it to the stretcher. (01 May 2018 05:16)    Overnight Event: Loaded with ASA, Plavix, started on lopressor 12.5mg 2xd    REVIEW OF SYSTEMS:  	    MEDICATIONS  (STANDING):  clopidogrel Tablet 75 milliGRAM(s) Oral daily  heparin  Infusion.  Unit(s)/Hr (10 mL/Hr) IV Continuous <Continuous>  influenza   Vaccine 0.5 milliLiter(s) IntraMuscular once  metoprolol tartrate 12.5 milliGRAM(s) Oral two times a day  simvastatin 80 milliGRAM(s) Oral at bedtime    MEDICATIONS  (PRN):  heparin  Injectable 5300 Unit(s) IV Push every 6 hours PRN For aPTT less than 40        PHYSICAL EXAM:  Vital Signs Last 24 Hrs  T(C): 36.6 (01 May 2018 05:20), Max: 36.7 (01 May 2018 01:12)  T(F): 97.9 (01 May 2018 05:20), Max: 98 (01 May 2018 01:12)  HR: 114 (01 May 2018 06:00) (114 - 127)  BP: 95/80 (01 May 2018 06:00) (88/67 - 122/67)  BP(mean): 86 (01 May 2018 06:00) (76 - 86)  RR: 21 (01 May 2018 06:00) (18 - 25)  SpO2: 100% (01 May 2018 06:00) (73% - 100%)  I&O's Summary    30 Apr 2018 07:01  -  01 May 2018 07:00  --------------------------------------------------------  IN: 20 mL / OUT: 0 mL / NET: 20 mL        Appearance: Normal	  HEENT:   Normal oral mucosa, PERRL, EOMI	  Lymphatic: No lymphadenopathy  Cardiovascular: Normal S1 S2, No JVD, No murmurs, No edema  Respiratory: Lungs clear to auscultation	  Psychiatry: A & O x 3, Mood & affect appropriate  Gastrointestinal:  Soft, Non-tender, + BS	  Skin: No rashes, No ecchymoses, No cyanosis	  Neurologic: Non-focal  Extremities: Normal range of motion, No clubbing, cyanosis or edema  Vascular: Peripheral pulses palpable 2+ bilaterally    LABS:	 	                        12.7   15.3  )-----------( 204      ( 01 May 2018 06:35 )             38.4     Auto Eosinophil # x     / Auto Eosinophil % x     / Auto Neutrophil # x     / Auto Neutrophil % x     / BANDS % x                            12.6   13.49 )-----------( 228      ( 01 May 2018 01:37 )             42.6     Auto Eosinophil # 0.00  / Auto Eosinophil % 0.0   / Auto Neutrophil # 11.22 / Auto Neutrophil % 83.2  / BANDS % x        INR: 1.74 ratio (05-01 @ 06:35)  INR: 1.41 ratio (05-01 @ 01:37)    05-01    134<L>  |  100  |  42<H>  ----------------------------<  127<H>  4.7   |  11<L>  |  1.80<H>  05-01    141  |  106  |  39<H>  ----------------------------<  124<H>  4.4   |  18<L>  |  2.24<H>    Ca    8.5      01 May 2018 06:35  Mg     2.2     05-01  Phos  4.4     05-01  TPro  6.4  /  Alb  3.6  /  TBili  2.5<H>  /  DBili  x   /  AST  376<H>  /  ALT  302<H>  /  AlkPhos  104  05-01  TPro  6.9  /  Alb  3.7  /  TBili  2.6<H>  /  DBili  x   /  AST  367<H>  /  ALT  288<H>  /  AlkPhos  129<H>  05-01      proBNP: Serum Pro-Brain Natriuretic Peptide: 79140 pg/mL (05-01 @ 06:35)    CARDIAC MARKERS:   01 May 2018 06:35 Troponin x     / Creatine Kinase 386 U/L /  CKMB 23.6 ng/mL / CPK Mass Assay % 6.1 %   01 May 2018 01:37 Troponin x     / Creatine Kinase 408 U/L /  CKMB 20.7 ng/mL / CPK Mass Assay % 5.1 %      TELEMETRY: 	    ECG:  	  RADIOLOGY:  OTHER: 	    PREVIOUS DIAGNOSTIC TESTING:    [ ] Echocardiogram:  [ ]  Catheterization:  [ ] Stress Test:  	  	  [facility]  Contact #[id number] Patient is a 63y old  Male who presents with a chief complaint of chest pain and SOB    HPI:  This is a 62 y/o M with CAD s/p MI and cardiac arrests, remote stents x 10 (2005), HFrEF s/p AICD ([employer]) with battery change 3 years ago, AAA, gastic ulcers, HLD, and BPH presented to [facility] c/o difficulty breathing and abdominal pain with wretching which they attributed to constipation.  Per patient’s spouse, he has been intermittent non-radiating chest pain x 1 month but refused to come to the ED.  Most of the info is obtained from patient’s spouse.  He has been chronically c/o dizziness and left arm pain since ICD implantation.  Patient has been off his medications (ASA, Plavix, and Lipitor) x 1 month and Entresto 8 months ago due to lack of insurance.  Denies CP, DAVIS, orthopnea, weight gain, fever, sick contacts, or recent travels.  Of note, patient has had difficulty remembering events since his cardiac arrest in 2005, according to his neurologist due to anoxia per spouse.  In OSH ED, EKG showed LBBB and elevated cardiac enzymes (XNS=492, CK/MB=20.7, Trop I=27.5).  Transferred to [facility] for concern of STEMI.  CTA of the chest, abdomen, and pelvis done showing no evidence of aortic dissection; 3.7 cm infrarenal abdominal aortic aneurysm with partial thrombotic plaque.  No central pulmonary embolism.  Small bilateral pleural effusions.  CT head showed no intracranial hemorrhage, mass effect or large acute cortical infarct.    Upon arrival to PICU, patient is pain-free, denies difficulty breathing but c/o back pain, attributing it to the stretcher. (01 May 2018 05:16)    Overnight Event: Loaded with ASA, Plavix, started on lopressor 12.5mg 2xd    REVIEW OF SYSTEMS: Feels SOB  	    MEDICATIONS  (STANDING):  clopidogrel Tablet 75 milliGRAM(s) Oral daily  heparin  Infusion.  Unit(s)/Hr (10 mL/Hr) IV Continuous <Continuous>  influenza   Vaccine 0.5 milliLiter(s) IntraMuscular once  metoprolol tartrate 12.5 milliGRAM(s) Oral two times a day  simvastatin 80 milliGRAM(s) Oral at bedtime    MEDICATIONS  (PRN):  heparin  Injectable 5300 Unit(s) IV Push every 6 hours PRN For aPTT less than 40        PHYSICAL EXAM:  Vital Signs Last 24 Hrs  T(C): 36.6 (01 May 2018 05:20), Max: 36.7 (01 May 2018 01:12)  T(F): 97.9 (01 May 2018 05:20), Max: 98 (01 May 2018 01:12)  HR: 114 (01 May 2018 06:00) (114 - 127)  BP: 95/80 (01 May 2018 06:00) (88/67 - 122/67)  BP(mean): 86 (01 May 2018 06:00) (76 - 86)  RR: 21 (01 May 2018 06:00) (18 - 25)  SpO2: 100% (01 May 2018 06:00) (73% - 100%)  I&O's Summary    30 Apr 2018 07:01  -  01 May 2018 07:00  --------------------------------------------------------  IN: 20 mL / OUT: 0 mL / NET: 20 mL        Appearance: Normal	  HEENT:   Normal oral mucosa, PERRL, EOMI	  Lymphatic: No lymphadenopathy  Cardiovascular: Normal S1 S2, No JVD, No murmurs, No edema  Respiratory: Lungs clear to auscultation	  Psychiatry: A & O x 3, Mood & affect appropriate  Gastrointestinal:  Soft, Non-tender, + BS	  Skin: No rashes, No ecchymoses, No cyanosis	  Neurologic: Non-focal  Extremities: Normal range of motion, No clubbing, cyanosis or edema  Vascular: Peripheral pulses palpable 2+ bilaterally    LABS:	 	                        12.7   15.3  )-----------( 204      ( 01 May 2018 06:35 )             38.4     Auto Eosinophil # x     / Auto Eosinophil % x     / Auto Neutrophil # x     / Auto Neutrophil % x     / BANDS % x                            12.6   13.49 )-----------( 228      ( 01 May 2018 01:37 )             42.6     Auto Eosinophil # 0.00  / Auto Eosinophil % 0.0   / Auto Neutrophil # 11.22 / Auto Neutrophil % 83.2  / BANDS % x        INR: 1.74 ratio (05-01 @ 06:35)  INR: 1.41 ratio (05-01 @ 01:37)    05-01    134<L>  |  100  |  42<H>  ----------------------------<  127<H>  4.7   |  11<L>  |  1.80<H>  05-01    141  |  106  |  39<H>  ----------------------------<  124<H>  4.4   |  18<L>  |  2.24<H>    Ca    8.5      01 May 2018 06:35  Mg     2.2     05-01  Phos  4.4     05-01  TPro  6.4  /  Alb  3.6  /  TBili  2.5<H>  /  DBili  x   /  AST  376<H>  /  ALT  302<H>  /  AlkPhos  104  05-01  TPro  6.9  /  Alb  3.7  /  TBili  2.6<H>  /  DBili  x   /  AST  367<H>  /  ALT  288<H>  /  AlkPhos  129<H>  05-01      proBNP: Serum Pro-Brain Natriuretic Peptide: 65899 pg/mL (05-01 @ 06:35)    CARDIAC MARKERS:   01 May 2018 06:35 Troponin x     / Creatine Kinase 386 U/L /  CKMB 23.6 ng/mL / CPK Mass Assay % 6.1 %   01 May 2018 01:37 Troponin x     / Creatine Kinase 408 U/L /  CKMB 20.7 ng/mL / CPK Mass Assay % 5.1 %      TELEMETRY: 	Sinus tachycardia    ECG:  	Sinus tach with LBBB  RADIOLOGY:		  	< from: Xray Chest 1 View- PORTABLE-Urgent (05.01.18 @ 01:38) >  FINDINGS:     There is a cardiac conduction device overlying the left axilla with   intact leads to the heart.    Lungs: The lungs are clear.  Heart: The heart is normal in size.  Mediastinum: The mediastinum is within normal limits.    IMPRESSION:    Clear lungs.      < end of copied text >    [facility]  Contact #[phone] Patient is a 63y old  Male who presents with a chief complaint of chest pain and SOB    HPI:  This is a 62 y/o M with CAD s/p MI and cardiac arrests, remote stents x 10 (2005), HFrEF s/p AICD ([employer]) with battery change 3 years ago, AAA, gastic ulcers, HLD, and BPH presented to [facility] c/o difficulty breathing and abdominal pain with wretching which they attributed to constipation.  Per patient’s spouse, he has been intermittent non-radiating chest pain x 1 month but refused to come to the ED.  Most of the info is obtained from patient’s spouse.  He has been chronically c/o dizziness and left arm pain since ICD implantation.  Patient has been off his medications (ASA, Plavix, and Lipitor) x 1 month and Entresto 8 months ago due to lack of insurance.  Denies CP, DAVIS, orthopnea, weight gain, fever, sick contacts, or recent travels.  Of note, patient has had difficulty remembering events since his cardiac arrest in 2005, according to his neurologist due to anoxia per spouse.  In OSH ED, EKG showed LBBB and elevated cardiac enzymes (NOB=808, CK/MB=20.7, Trop I=27.5).  Transferred to [facility] for concern of STEMI.  CTA of the chest, abdomen, and pelvis done showing no evidence of aortic dissection; 3.7 cm infrarenal abdominal aortic aneurysm with partial thrombotic plaque.  No central pulmonary embolism.  Small bilateral pleural effusions.  CT head showed no intracranial hemorrhage, mass effect or large acute cortical infarct.    Upon arrival to PICU, patient is pain-free, denies difficulty breathing but c/o back pain, attributing it to the stretcher. (01 May 2018 05:16)    Overnight Event: Loaded with ASA, Plavix, started on lopressor 12.5mg 2xd    REVIEW OF SYSTEMS: Feels SOB  Decompensated this AM requiring Lasix 80mg IVP and BIPAP support  	    MEDICATIONS  (STANDING):  clopidogrel Tablet 75 milliGRAM(s) Oral daily  heparin  Infusion.  Unit(s)/Hr (10 mL/Hr) IV Continuous <Continuous>  influenza   Vaccine 0.5 milliLiter(s) IntraMuscular once  metoprolol tartrate 12.5 milliGRAM(s) Oral two times a day  simvastatin 80 milliGRAM(s) Oral at bedtime    MEDICATIONS  (PRN):  heparin  Injectable 5300 Unit(s) IV Push every 6 hours PRN For aPTT less than 40      PHYSICAL EXAM:  Vital Signs Last 24 Hrs  T(C): 36.6 (01 May 2018 05:20), Max: 36.7 (01 May 2018 01:12)  T(F): 97.9 (01 May 2018 05:20), Max: 98 (01 May 2018 01:12)  HR: 114 (01 May 2018 06:00) (114 - 127)  BP: 95/80 (01 May 2018 06:00) (88/67 - 122/67)  BP(mean): 86 (01 May 2018 06:00) (76 - 86)  RR: 21 (01 May 2018 06:00) (18 - 25)  SpO2: 100% (01 May 2018 06:00) (73% - 100%)  I&O's Summary    30 Apr 2018 07:01  -  01 May 2018 07:00  --------------------------------------------------------  IN: 20 mL / OUT: 0 mL / NET: 20 mL      Appearance: tachypneic  HEENT:   Normal oral mucosa, PERRL, EOMI	  Cardiovascular: Normal S1 S2, +JVD, No murmurs, No edema  Respiratory: Crackles mid>lower b/l bases  Psychiatry: A & O x 3, Mood & affect appropriate  Gastrointestinal:  Soft, Non-tender, + BS	  Skin: No rashes, No ecchymoses, No cyanosis	  Neurologic: Non-focal  Extremities: Normal range of motion, No clubbing, cyanosis or edema  Vascular: Peripheral pulses palpable 2+ bilaterally    LABS:	 	                        12.7   15.3  )-----------( 204      ( 01 May 2018 06:35 )             38.4     Auto Eosinophil # x     / Auto Eosinophil % x     / Auto Neutrophil # x     / Auto Neutrophil % x     / BANDS % x                            12.6   13.49 )-----------( 228      ( 01 May 2018 01:37 )             42.6     Auto Eosinophil # 0.00  / Auto Eosinophil % 0.0   / Auto Neutrophil # 11.22 / Auto Neutrophil % 83.2  / BANDS % x        INR: 1.74 ratio (05-01 @ 06:35)  INR: 1.41 ratio (05-01 @ 01:37)    05-01    134<L>  |  100  |  42<H>  ----------------------------<  127<H>  4.7   |  11<L>  |  1.80<H>  05-01    141  |  106  |  39<H>  ----------------------------<  124<H>  4.4   |  18<L>  |  2.24<H>    Ca    8.5      01 May 2018 06:35  Mg     2.2     05-01  Phos  4.4     05-01  TPro  6.4  /  Alb  3.6  /  TBili  2.5<H>  /  DBili  x   /  AST  376<H>  /  ALT  302<H>  /  AlkPhos  104  05-01  TPro  6.9  /  Alb  3.7  /  TBili  2.6<H>  /  DBili  x   /  AST  367<H>  /  ALT  288<H>  /  AlkPhos  129<H>  05-01      proBNP: Serum Pro-Brain Natriuretic Peptide: 72900 pg/mL (05-01 @ 06:35)    CARDIAC MARKERS:   01 May 2018 06:35 Troponin x     / Creatine Kinase 386 U/L /  CKMB 23.6 ng/mL / CPK Mass Assay % 6.1 %   01 May 2018 01:37 Troponin x     / Creatine Kinase 408 U/L /  CKMB 20.7 ng/mL / CPK Mass Assay % 5.1 %      TELEMETRY: 	Sinus tachycardia    ECG:  	Sinus tach with LBBB  RADIOLOGY:		  	< from: Xray Chest 1 View- PORTABLE-Urgent (05.01.18 @ 01:38) >  FINDINGS:     There is a cardiac conduction device overlying the left axilla with   intact leads to the heart.    Lungs: The lungs are clear.  Heart: The heart is normal in size.  Mediastinum: The mediastinum is within normal limits.    IMPRESSION:    Clear lungs.    < end of copied text >      [facility]  Contact #[phone]

## 2018-05-01 NOTE — CONSULT NOTE ADULT - ASSESSMENT
64 y/o M with CAD s/p MI and cardiac arrests, PCI x 10 in past, HFrEF/NICM (biventricular dysfunction, EF 18%, severe MR, LVIDd 6.6 cmc) s/p AICD presenting with cardiogenic shock. NYHA Class 4, INTERMACS 2. RA 32, RV 70/22, PA 58/44 (44), PCW 34 with Pa sat of 22, and CI of .9. LHC with diffuse disease, 3VD at sites of previous PCI.   -please start milrinone at .125 mcg/kg/min. Need to repeat mixed venous/numbers q 4-6 hours given border line values. Trend LA   -start furosemide 10 mg/hour. Goal output 2 L into tomorrow given level of elevated filling pressures. Currently making 100 cc/hour  -BMP q 12 hours with repletion of K>4, Mg> 2. If outputs slow down/cease with worsening acidosis, will have to consider HD for clearance/renal decompression.   -c/w IABP at current settings given good augmentation   -LVAD launch in progress, appreciate CTS recommendations-disease likely not amenable to revascularization   -please reach out to the HF team with any signs of deterioration as patient may need more support     Please call me at [phone] for any further questions up till 5 pm. For after hours, please call the covering cardiology [place] fellow at [phone] for any further assistance. 64 y/o M with CAD s/p MI and cardiac arrests, PCI x 10 in past, HFrEF/NICM (biventricular dysfunction, EF 18%, severe MR, LVIDd 6.6 cmc) s/p AICD presenting with cardiogenic shock. NYHA Class 4, INTERMACS 2. RA 32, RV 70/22, PA 58/44 (44), PCW 34 with Pa sat of 22, and CI of .9. LHC with diffuse disease, 3VD at sites of previous PCI.   -please start milrinone at .125 mcg/kg/min. Need to repeat mixed venous/numbers q 4-6 hours given border line values. Trend LA   -start furosemide 10 mg/hour. Goal output 2 L into tomorrow given level of elevated filling pressures. Currently making 100 cc/hour  -BMP q 12 hours with repletion of K>4, Mg> 2. If outputs slow down/cease with worsening acidosis, will have to consider HD for clearance/renal decompression.   -c/w IABP at current settings given good augmentation   -LVAD launch in progress, appreciate CTS recommendations-disease likely not amenable to revascularization   -primary team to obtain more collateral from patient's outpatient cardiologist   -please reach out to the HF team with any signs of deterioration as patient may need more support     Please call me at [phone] for any further questions up till 5 pm. For after hours, please call the covering cardiology [place] fellow at [phone] for any further assistance.

## 2018-05-01 NOTE — CONSULT NOTE ADULT - PROBLEM SELECTOR RECOMMENDATION 2
Continue asa/brilinta for hx of stents  Per Dr. Retana, pt would not tolera Continue asa/brilinta for hx of stents  Wean IABP as tolerated  Per Dr. Retana, pt would not tolerate bypass procedure at this time

## 2018-05-01 NOTE — H&P ADULT - HISTORY OF PRESENT ILLNESS
This is a 64 y/o M with CAD s/p MI and cardiac arrests, remote stents x 10 (2005), HFrEF s/p AICD ([employer]) with battery change 3 years ago, AAA, gastic ulcers, HLD, and BPH presented to [facility] c/o difficulty breathing and abdominal pain with wretching which they attributed to constipation.  Per patient’s spouse, he has been intermittent non-radiating chest pain x 1 month but refused to come to the ED.  Most of the info is obtained from patient’s spouse.  He has been chronically c/o dizziness and left arm pain since ICD implantation.  Patient has been off his medications (ASA, Plavix, and Lipitor) x 1 month and Entresto 8 months ago due to lack of insurance.  Denies CP, DAVIS, orthopnea, weight gain, fever, sick contacts, or recent travels. This is a 64 y/o M with CAD s/p MI and cardiac arrests, remote stents x 10 (2005), HFrEF s/p AICD ([employer]) with battery change 3 years ago, AAA, gastic ulcers, HLD, and BPH [facility]/o difficulty breathing and abdominal pain with wretching which they attributed to constipation.  Per patient’s spouse, he has been intermittent non-radiating chest pain x 1 month but refused to come to the ED.  Most of the info is obtained from patient’s spouse.  He has been chronically c/o dizziness and left arm pain since ICD implantation.  Patient has been off his medications (ASA, Plavix, and Lipitor) x 1 month and Entresto 8 months ago due to lack of insurance.  Denies CP, DAVIS, orthopnea, weight gain, fever, sick contacts, or recent travels.  Of note, patient has had difficulty remembering events since his cardiac arrest in 2005, according to his neurologist due to anoxia per spouse.  In OSH ED, EKG showed LBBB and elevated cardiac enzymes (DGS=547, CK/MB=20.7, Trop I=27.5).  Transferred to [facility] for concern of STEMI.  CTA of the chest, abdomen, and pelvis done showing no evidence of aortic dissection; 3.7 cm infrarenal abdominal aortic aneurysm with partial thrombotic plaque.  No central pulmonary embolism.  Small bilateral pleural effusions.  CT head showed no intracranial hemorrhage, mass effect or large acute cortical infarct.    Upon arrival to PICU, patient is pain-free, denies difficulty breathing but c/o back pain, attributing it to the stretcher.

## 2018-05-01 NOTE — ED PROVIDER NOTE - MEDICAL DECISION MAKING DETAILS
Pt w CP & vomiting, labs remarkable for elevated trop, CK MB.  CT head neg for ICH, CTA neg for dissection.  Discussed w [facility] CCU, pt Pt w CP & vomiting, labs remarkable for elevated trop, CK MB.  CT head neg for ICH, CTA neg for dissection.  Discussed w [facility] CCU, pt transferred to CCU, Dr. Wright accepting.

## 2018-05-01 NOTE — H&P ADULT - PROBLEM SELECTOR PLAN 2
Monitor for signs od volume overload  Monitor electrolytes  Monitor I & O's  Patient with AICD ([employer])  Continue BB  Unable to start ACEI or ARB due to JANENE

## 2018-05-01 NOTE — H&P ADULT - PROBLEM SELECTOR PLAN 1
Admit to PICU  Trend cardiac enzymes  Monitor CBC, CMP, TSH. lipids  Continue Heparin ACS protocol  Continue DAPT and BB  TTE to assess LVF and wall motion Admit to PICU  Trend cardiac enzymes  Monitor CBC, CMP, TSH. lipids  Continue Heparin ACS protocol  Continue DAPT and BB  TTE to assess LVF and wall motion  Eventual cath when kidney fucntion improves

## 2018-05-01 NOTE — CONSULT NOTE ADULT - SUBJECTIVE AND OBJECTIVE BOX
Psychological Assessment for Left Ventricular Assist Device and Heart Transplant    Patient information: Mr. Franklin is a 63 year-old man,  with 2 sons, 1 stepdaughter.  Lives with his wife in private house in [place].  Languages spoken: Cantonese and English.  Met with patient, his wife, and 2 sons to complete psychological assessment for LVAD/HT as patient is unable to provide much information.  Additional information was obtained from chart.  As per wife, patient has long standing cognitive impairment with short term memory deficits from anoxic brain injury following cardiac arrests and seizure in 2005.     HPI: CAD s/p MI and cardiac arrests (2005), PCI x 10 in past, HFrEF/NICM (biventricular dysfunction, EF 18%, severe MR, LVIDd 6.6 cmc) s/p AICD ([employer]), HLD who presented to [facility] c/o difficulty breathing and abdominal pain with wretching, ongoing for a week or so.  Per patient’s spouse, he has been intermittent non-radiating chest pain x 1 month but refused to come to the ED.  Most of the info is obtained from patient’s spouse and medical record.  He has been chronically c/o dizziness and left arm pain since ICD implantation.  Patient has been off his medications (ASA, Plavix, and Lipitor) x 1 month and Entresto 8 months ago due to lack of insurance.  Denies CP, DAVIS, orthopnea, weight gain, fever, sick contacts, or recent travels.  Of note, patient has had difficulty remembering events since his cardiac arrest in 2005, according to his neurologist due to anoxia per spouse.  Capacity/understanding of procedure:  Patient unable to provide information on why he is in hospital or what treatment plan is being considered.  "I’m not sure why I am here."  As per family, he knows he had a "heart attack" many years ago but is unclear about specific information pertaining to his heart disease or treatment recommendations.  Needed frequent reminders that he had a Collins catheter and asked to use the urinal every few minutes.  Family met with Dr. Retana and HT/LVAD coordinator who provided information on the process, indications, risks, benefits, and alternative options of LVAD/HD.  Wife and sons understand necessary lifestyle changes post LVAD including need for weekly dressing changes, water restriction, MD follow up, battery management.  Wife unsure about pursuing heart transplant given her husbands "limited quality of life" due to cognitive impairment.  She reports he doesn’t leave the house and "sits in front of a TV all day."  As per wife, she and  have had prior conversations about his quality of life.         Adherence with heart failure guidelines/treatment recommendations:  Poor adherence with medication adherence.  Has not taken medication consistently for many years due to financial and insurance issues.  Checks weight 1x/week.   Follows low Na diet (1600mg Na) most days.  No physical activity.  As per wife, when awake sits on couch for entire day and watches TV or reads magazines on cars.  Does not go out of the house.      Psychological/emotional health/coping strategies:  Patient denies anxiety/depression.  As per wife, his mood has been stable but she noticed some anxiety prior to admission.  Wife reports her  experienced a 2 year period of depression following anoxic brain injury associated with cardiac arrest (2005).  He subsequently attended an outpatient cognitive program for patients with brain injury in [place] ([facility]).  As per wife, he has memory impairment and requires frequent reminders of where he is and why.   At home patient follows a schedule and does well with structure.  New activities or changes in schedule need to be repeated often initially.  Wife denies observing any mood issues or issues with anger or aggression.  Denies any inpatient psychiatric admissions.  Denies any suicide attempts or homicidal attempts.  Patient sleeps 10-12 hours mostly during the day.  Goes to bed at 6am and wakes around 5pm when wife returns from work.  This schedule works well for family as wife is home with patient with exception of 5 hours in afternoon.      Availability of social support system:  Wife (Fanta Gauthier [phone]) identified as primary support person.  Indicated she would take time off as needed to provide whatever assistance is necessary following LVAD.  She works at home in the morning, has private acupuncture practice and in afternoon works part time outside the home.  Sons (Noah Franklin age 30, [phone] and Robinson Franklin age 31, [phone]) are available to provide back up support as needed.  Noah has a flexible schedule (works as ) and will be available as needed.     Substance use:  As per wife, former tobacco use; quit 14 years ago following cardiac arrest.  Smoked 2PPD from age 13 to 49.  Denies any alcohol or drug use.       Cognitive/mental status exam: Patient seen resting comfortably in bed.  Pleasant and cooperative.  A&Ox. Well related with good eye contact.  Speech normal rate and volume.  Thought process concrete.  Thought content: no evidence of psychosis, delusions, chiquita.  Mood "I’m ok."  Affect appropriate.  Insight and judgment limited.      Summary: Mr. Franklin is a 63-year-old man with end stage heart failure, being evaluated for LVAD/HT.  Information obtained from family and chart.  Patient unable to provide any information on his medical/psychiatric history due to cognitive impairment (result of anoxic brain injury following cardiac arrest in 2005 as per wife).  Strong support system (wife, 2 sons).  As per wife, patient had 2 year history of depression after cardiac arrest and was treated at [facility].  Started on antidepressants but stopped as they made him feel dizzy.  Since that time wife reports mood has been stable. Former smoker, quit 2005.  No alcohol or drug use.    Dx:  Cognitive impairment   Adjustment disorder     Impression:  Potential barriers to LVAD/HT given cognitive impairment   ·	Will speak with [facility] to obtain additional information on extent of cognitive impairment   ·	Will do cognitive testing on Thursday   ·	Strong support system (wife and 2 sons)   ·	Patient has no capacity to make health decisions; wife (Fanta Gauthier [phone]) is HCP and signed consent for LVAD.    ·	Poor medication adherence (as per wife due to financial/insurance issues).   ·	Denies current tobacco/alcohol/other substance use.  Past tobacco use.  ·	As per wife, mood has been stable.  History of depression following cardiac arrest in 2005.   ·	Discussed with CCU team necessity of providing ongoing observation given IAPB and patient’s cognitive status. Psychological Assessment for Left Ventricular Assist Device and Heart Transplant    Patient information: Mr. Franklin is a 63 year-old man,  with 2 sons, 1 stepdaughter.  Lives with his wife in private house in [place].  Languages spoken: Cantonese and English.  Met with patient, his wife, and 2 sons to complete psychological assessment for LVAD/HT as patient is unable to provide much information.  Additional information was obtained from chart.  As per wife, patient has long standing cognitive impairment with short term memory deficits from anoxic brain injury following cardiac arrests and seizure in 2005.     HPI: CAD s/p MI and cardiac arrests (2005), PCI x 10 in past, HFrEF/NICM (biventricular dysfunction, EF 18%, severe MR, LVIDd 6.6 cmc) s/p AICD ([employer]), HLD who presented to [facility] c/o difficulty breathing and abdominal pain with wretching, ongoing for a week or so.  Per patient’s spouse, he has been intermittent non-radiating chest pain x 1 month but refused to come to the ED.  Most of the info is obtained from patient’s spouse and medical record.  He has been chronically c/o dizziness and left arm pain since ICD implantation.  Patient has been off his medications (ASA, Plavix, and Lipitor) x 1 month and Entresto 8 months ago due to lack of insurance.  Denies CP, DAVIS, orthopnea, weight gain, fever, sick contacts, or recent travels.  Of note, patient has had difficulty remembering events since his cardiac arrest in 2005, according to his neurologist due to anoxia per spouse.  Capacity/understanding of procedure:  Patient unable to provide information on why he is in hospital or what treatment plan is being considered.  "I’m not sure why I am here."  As per family, he knows he had a "heart attack" many years ago but is unclear about specific information pertaining to his heart disease or treatment recommendations.  Needed frequent reminders that he had a Collins catheter and asked to use the urinal every few minutes.  Family met with Dr. Retana and HT/LVAD coordinator who provided information on the process, indications, risks, benefits, and alternative options of LVAD/HD.  Wife and sons understand necessary lifestyle changes post LVAD including need for weekly dressing changes, water restriction, MD follow up, battery management.  Wife unsure about pursuing heart transplant given her husbands "limited quality of life" due to cognitive impairment.  She reports he doesn’t leave the house and "sits in front of a TV all day."  As per wife, she and  have had prior conversations about his quality of life.         Adherence with heart failure guidelines/treatment recommendations:  Poor adherence with medication adherence.  Has not taken medication consistently for many years due to financial and insurance issues.  Checks weight 1x/week.   Follows low Na diet (1600mg Na) most days.  No physical activity.  As per wife, when awake sits on couch for entire day and watches TV or reads magazines on cars.  Does not go out of the house.      Psychological/emotional health/coping strategies:  Patient denies anxiety/depression. As per wife, his mood has been stable but she noticed some anxiety prior to admission.  Wife reports her  experienced a 2 year period of depression following anoxic brain injury associated with cardiac arrest (2005).  He subsequently attended an outpatient cognitive program for patients with brain injury in [place] ([facility]).  As per wife, he has memory impairment and requires frequent reminders of where he is and why.   At home patient follows a schedule and does well with structure.  New activities or changes in schedule need to be repeated often initially.  Wife denies observing any mood issues or issues with anger or aggression.  Denies any inpatient psychiatric admissions.  Denies any suicide attempts or homicidal attempts.  Patient sleeps 10-12 hours mostly during the day.  Goes to bed at 6am and wakes around 5pm when wife returns from work.  This schedule works well for family as wife is home with patient with exception of 5 hours in afternoon.      Availability of social support system:  Wife (Fanta Gauthier [phone]) identified as primary support person.  Indicated she would take time off as needed to provide whatever assistance is necessary following LVAD.  She works at home in the morning, has private acupuncture practice and in afternoon works part time outside the home.  Sons (Noah Franklin age 30, [phone] and Robinson Franklin age 31, [phone]) are available to provide back up support as needed.  Noah has a flexible schedule (works as ) and will be available as needed.     Substance use:  As per wife, former tobacco use; quit 14 years ago following cardiac arrest.  Smoked 2PPD from age 13 to 49.  Denies any alcohol or drug use.       Cognitive/mental status exam: Patient seen resting comfortably in bed.  Pleasant and cooperative.  A&Ox2 (unable to initially identify year, day, season).  Well related with good eye contact.  Speech normal rate and volume.  Thought process concrete.  Thought content: no evidence of psychosis, delusions, chiquita.  Mood "good."  Affect appropriate.  Insight and judgment limited.      Summary: Mr. Franklin is a 63-year-old man with end stage heart failure, being evaluated for LVAD/HT.  Information obtained from family and chart.  Patient unable to provide any information on his medical/psychiatric history due to cognitive impairment (result of anoxic brain injury following cardiac arrest in 2005 as per wife).  Strong support system (wife, 2 sons).  As per wife, patient had 2 year history of depression after cardiac arrest and was treated at [facility].  Started on antidepressants but stopped as they made him feel dizzy.  Since that time wife reports mood has been stable. Former smoker, quit 2005.  No alcohol or drug use.    Dx:  Cognitive impairment   Adjustment disorder     Impression:  Potential barriers to LVAD/HT given cognitive impairment   ·	Will do cognitive testing on Thursday   ·	Strong support system (wife and 2 sons)   ·	Patient has no capacity to make health decisions; wife (Fanta Gauthier [phone]) is HCP and signed consent for evaluation    ·	Poor medication adherence (as per wife due to financial/insurance issues).   ·	Denies current tobacco/alcohol/other substance use.  Past tobacco use.  ·	As per wife, mood has been stable.  History of depression following cardiac arrest in 2005.   ·	Discussed with CCU team necessity of providing 1:1 observation given IAPB and patient’s cognitive status. Psychological Assessment for Left Ventricular Assist Device and Heart Transplant    Patient information: Mr. Franklin is a 63 year-old man,  with 2 sons, 1 stepdaughter.  Lives with his wife in private house in [place].  Languages spoken: Cantonese and English.  Met with patient, his wife, and 2 sons to complete psychological assessment for LVAD/HT as patient is unable to provide much information.  Additional information was obtained from chart.  As per wife, patient has long standing cognitive impairment with short term memory deficits from anoxic brain injury following cardiac arrests and seizure in 2005.     HPI: CAD s/p MI and cardiac arrests (2005), PCI x 10 in past, HFrEF/NICM (biventricular dysfunction, EF 18%, severe MR, LVIDd 6.6 cmc) s/p AICD ([employer]), HLD who presented to [facility] c/o difficulty breathing and abdominal pain with wretching, ongoing for a week or so.  Per patient’s spouse, he has been intermittent non-radiating chest pain x 1 month but refused to come to the ED.  Most of the info is obtained from patient’s spouse and medical record.  He has been chronically c/o dizziness and left arm pain since ICD implantation.  Patient has been off his medications (ASA, Plavix, and Lipitor) x 1 month and Entresto 8 months ago due to lack of insurance.  Denies CP, DAVIS, orthopnea, weight gain, fever, sick contacts, or recent travels.  Of note, patient has had difficulty remembering events since his cardiac arrest in 2005, according to his neurologist due to anoxia per spouse.  Capacity/understanding of procedure:  Patient understands he is in hospital but unable to provide further information on why he is in hospital or what treatment plan is being considered, responded by saying "ask my wife."  As per family, he knows he had a "heart attack" many years ago but is unclear about specific information pertaining to his heart disease or treatment recommendations.  Needed frequent reminders that he had a Collins catheter and asked to use the urinal every few minutes.  Family met with Dr. Retana and HT/LVAD coordinator who provided information on the process, indications, risks, benefits, and alternative options of LVAD/HD.  Wife and sons understand necessary lifestyle changes post LVAD including need for weekly dressing changes, water restriction, MD follow up, battery management.  Wife unsure about pursuing heart transplant given her husbands "limited quality of life" due to cognitive impairment.  She reports he doesn’t leave the house and "sits in front of a TV all day."  As per wife, she and  have had prior conversations about his quality of life.         Adherence with heart failure guidelines/treatment recommendations:  Poor adherence with medication adherence.  Has not taken medication consistently for many years due to financial and insurance issues.  Checks weight 1x/week.   Follows low Na diet (1600mg Na) most days.  No physical activity.  As per wife, when awake sits on couch for entire day and watches TV or reads magazines on cars.  Does not go out of the house.      Psychological/emotional health/coping strategies:  Patient denies anxiety/depression. As per wife, his mood has been stable but she noticed some anxiety prior to admission.  Wife reports her  experienced a 2 year period of depression following anoxic brain injury associated with cardiac arrest (2005).  He subsequently attended an outpatient cognitive program for patients with brain injury in [place] ([facility]).  As per wife, he has memory impairment and requires frequent reminders of where he is and why.   At home patient follows a schedule and does well with structure.  New activities or changes in schedule need to be repeated often initially.  Wife denies observing any mood issues or issues with anger or aggression.  Denies any inpatient psychiatric admissions.  Denies any suicide attempts or homicidal attempts.  Patient sleeps 10-12 hours mostly during the day.  Goes to bed at 6am and wakes around 5pm when wife returns from work.  This schedule works well for family as wife is home with patient with exception of 5 hours in afternoon.      Availability of social support system:  Wife (Fanta Gauthier [phone]) identified as primary support person.  Indicated she would take time off as needed to provide whatever assistance is necessary following LVAD.  She works at home in the morning, has private acupuncture practice and in afternoon works part time outside the home.  Sons (Noah Franklin age 30, [phone] and Robinson Franklin age 31, [phone]) are available to provide back up support as needed.  Noah has a flexible schedule (works as ) and will be available as needed.     Substance use:  As per wife, former tobacco use; quit 14 years ago following cardiac arrest.  Smoked 2PPD from age 13 to 49.  Denies any alcohol or drug use.       Cognitive/mental status exam: Patient seen resting comfortably in bed.  Pleasant and cooperative.  A&Ox2 (unable to initially identify year, day, season).  Well related with good eye contact.  Speech normal rate and volume.  Thought process concrete.  Thought content: no evidence of psychosis, delusions, chiquita.  Mood "good."  Affect appropriate.  Insight and judgment limited.      Summary: Mr. Franklin is a 63-year-old man with end stage heart failure, being evaluated for LVAD/HT.  Information obtained from family and chart.  Patient unable to provide any information on his medical/psychiatric history due to cognitive impairment (result of anoxic brain injury following cardiac arrest in 2005 as per wife).  Strong support system (wife, 2 sons).  As per wife, patient had 2 year history of depression after cardiac arrest and was treated at [facility].  Started on antidepressants but stopped as they made him feel dizzy.  Since that time wife reports mood has been stable. Former smoker, quit 2005.  No alcohol or drug use.    Dx:  Cognitive impairment   Adjustment disorder     Impression:  Potential barriers to LVAD/HT given cognitive impairment   ·	Will do cognitive testing on Thursday   ·	Strong support system (wife and 2 sons)   ·	Patient has no capacity to make health decisions; wife (Fanta Gauthier [phone]) is HCP and signed consent for evaluation    ·	Poor medication adherence (as per wife due to financial/insurance issues).   ·	Denies current tobacco/alcohol/other substance use.  Past tobacco use.  ·	As per wife, mood has been stable.  History of depression following cardiac arrest in 2005.   ·	Discussed with CCU team necessity of providing 1:1 observation given IAPB and patient’s cognitive status.

## 2018-05-01 NOTE — ED ADULT NURSE NOTE - CHIEF COMPLAINT QUOTE
Patient walked in via ambulance triage, transferrred to [facility], c/o chest pain abd difficulty breathing time of onset 0030 hours. Hx Pacemaker/AICD, heart failure, 7 stents, aneurysm

## 2018-05-01 NOTE — CONSULT NOTE ADULT - SUBJECTIVE AND OBJECTIVE BOX
Chief Complaint: SOB & CP    HPI:  63M PMHx CAD/MI/cardiac arrest/PCI x 10 (2005), HFrEF s/p AICD ([employer]) with battery change 3 years ago, AAA, gastic ulcers, HLD, and BPH p/w SOB and CP to OSH was found to have LBBB w/ elevated CE;s and transferred to [facility] for emergent LHC for STEMI equivalent. Pt had CT chest/abdomen that demonstrated AAA as below. During LHC unable to intervene on triple vessel disease with concern for caliber for peripheral arterial disease which was not amenable for impella placement, therefore IABP was placed. Vascular evaluation was called for further investigation of PAD pending LVAD evaluation.        PMH:   Myocardial infarction, unspecified MI type, unspecified artery  Coronary artery disease involving native coronary artery of native heart without angina pectoris  AAA (abdominal aortic aneurysm) without rupture  Hypotension, unspecified hypotension type  Heart failure of unknown etiology  High cholesterol    PSH:   AICD (automatic cardioverter/defibrillator) present    Medications:   aspirin  chewable 81 milliGRAM(s) Oral daily  atorvastatin 40 milliGRAM(s) Oral at bedtime  furosemide Infusion 10 mG/Hr IV Continuous <Continuous>  heparin  Infusion.  Unit(s)/Hr IV Continuous <Continuous>  heparin  Injectable 5300 Unit(s) IV Push every 6 hours PRN  influenza   Vaccine 0.5 milliLiter(s) IntraMuscular once  milrinone Infusion 0.25 MICROgram(s)/kG/Min IV Continuous <Continuous>  ticagrelor 90 milliGRAM(s) Oral two times a day    Home Medications:  Home Medications:  Plavix 75 mg oral tablet: 1 tab(s) orally once a day (01 May 2018 01:44)  simvastatin 40 mg oral tablet: 1 tab(s) orally once a day (at bedtime) (01 May 2018 01:44)  Toprol-XL 25 mg oral tablet, extended release: 1 tab(s) orally once a day (01 May 2018 05:28)    Allergies:  No Known Allergies    Family Hx:  FAMILY HISTORY:  No pertinent family history in first degree relatives    Social History:   Smoking: denies  Alcohol: denies  Drugs: denies    Review of Systems:  Constitutional: [ ] Fever [ ] Chills [ ] Fatigue [ ] Weight change   HEENT: [ ] Blurred vision [ ] Eye Pain [ ] Headache [ ] Runny nose [ ] Sore Throat   Respiratory: [ ] Cough [ ] Wheezing [ x] Shortness of breath  Cardiovascular: [ x] Chest Pain [ ] Palpitations [ x] DAVIS [ ] PND [ ] Orthopnea  Gastrointestinal: [ ] Abdominal Pain [ ] Diarrhea [ ] Constipation [ ] Hemorrhoids [ ] Nausea [ ] Vomiting  Genitourinary: [ ] Nocturia [ ] Dysuria [ ] Incontinence  Extremities: [ ] Swelling [ ] Joint Pain  Neurologic: [ ] Focal deficit [ ] Paresthesias [ ] Syncope  Lymphatic: [ ] Swelling [ ] Lymphadenopathy   Skin: [ ] Rash [ ] Ecchymoses [ ] Wounds [ ] Lesions  Psychiatry: [ ] Depression [ ] Suicidal/Homicidal Ideation [ ] Anxiety [ ] Sleep Disturbances  [ x] 10 point review of systems is otherwise negative except as mentioned above            [ ]Unable to obtain    Physical Exam:  T(F): 97.4 (05-01-18 @ 12:15), Max: 98 (05-01-18 @ 01:12)  HR: 92 (05-01-18 @ 18:00) (90 - 127)  BP: 126/66 (05-01-18 @ 12:15) (69/59 - 126/66)  RR: 18 (05-01-18 @ 18:00) (14 - 33)  SpO2: 100% (05-01-18 @ 18:00) (73% - 100%)    04-30 @ 07:01  -  05-01 @ 07:00  --------------------------------------------------------  IN: 20 mL / OUT: 0 mL / NET: 20 mL    05-01 @ 07:01  -  05-01 @ 18:53  --------------------------------------------------------  IN: 58.9 mL / OUT: 1250 mL / NET: -1191.1 mL      Daily Height in cm: 170.18 (01 May 2018 05:20)    Daily     Appearance: [ xNormal [ x] NAD  Eyes: [xPERRL [ ] EOMI  HENT: [x ] Normal oral [place] x[ ]NC/AT  Cardiovascular: [ x] S1 [x ] S2 x ] RRR [ x] No m/r/g [ x]No edema [ x] no JVP  Procedural Access Site: [x ] No hematoma [x ] Non-tender to palpation  [x] No bruit [ x] No Ecchymosis  Respiratory: [x ] Clear to auscultation bilaterally  Gastrointestinal: [x ] Soft [ x] Non-tender [ x] Non-distended [x ] BS+  Musculoskeletal: [x ] No clubbing [x ] No joint deformity   Vascular: BL UE: +2 Rad/Ulnar pulses, wwp, n/v/i, RLE: groin IABP in place c/d/i, +1DP +2PT, wwp, n/v/i; LLE: +2 DP/PT, wwp, n/v/i  Neurologic: [x ] Non-focal grossly  Psychiatry: [x ] AAOx3 [ x] Mood & affect appropriate  Skin: [x ] No rashes [x ] No ecchymoses [ x] No cyanosis    Labs:                        12.7   19.7  )-----------( 200      ( 01 May 2018 14:24 )             39.9     05-01    139  |  99  |  49<H>  ----------------------------<  105<H>  5.0   |  10<LL>  |  2.26<H>    Ca    8.2<L>      01 May 2018 14:24  Phos  7.1     05-01  Mg     2.3     05-01    TPro  6.4  /  Alb  3.7  /  TBili  3.3<H>  /  DBili  x   /  AST  1359<H>  /  ALT  1024<H>  /  AlkPhos  104  05-01    PT/INR - ( 01 May 2018 14:24 )   PT: 21.9 sec;   INR: 2.00 ratio      Blood Gas Arterial, Lactate (05.01.18 @ 18:09)    Blood Gas Arterial, Lactate: 5.0 mmol/L     PTT - ( 01 May 2018 14:24 )  PTT:32.0 sec  CARDIAC MARKERS ( 01 May 2018 14:24 )  x     / 1.98 ng/mL / 506 U/L / x     / 30.0 ng/mL  CARDIAC MARKERS ( 01 May 2018 06:35 )  x     / 1.16 ng/mL / 386 U/L / x     / 23.6 ng/mL  CARDIAC MARKERS ( 01 May 2018 01:37 )  27.500 ng/mL / x     / 408 U/L / x     / 20.7 ng/mL      Serum Pro-Brain Natriuretic Peptide: 19830 pg/mL (05-01 @ 06:35)      Hemoglobin A1C, Whole Blood: 5.5 % (05-01 @ 16:03)          ECG: NSR w/ LBBB    Echo:  < from: TTE with Doppler (w/Cont) (05.01.18 @ 06:37) >  Conclusions:  1. Severe mitral regurgitation.  2. Severe left ventricular enlargement.  3. Severe left ventricular systolic dysfunction.  Endocardial visualization enhanced with intravenous  injection of echo contrast (Definity). No left ventricular  thrombus.  4. Severe diastolic dysfunction (Stage III).  5. Right atrial enlargement.  6. Right ventricular enlargement with decreased right  ventricular systolic function.  A device wire is noted in  the right heart.  *** No previous Echo exam.  ------------------------------------------------------------------------  Confirmed on  5/1/2018 - 09:46:30 by Jayme Salguero M.D.  ------------------------------------------------------------------------    < end of copied text >      Cath: report pending    Interpretation of Telemetry: NSR    Imaging:  < from: CT Angio Abdomen w/ IV Cont (05.01.18 @ 02:25) >  IMPRESSION:    No evidence of aortic dissection. 3.7 cm infrarenal abdominal aortic   aneurysm with partial thrombotic plaque. Multifocal areas of   atherosclerotic plaque. No central pulmonary embolism. Dilatedmain   pulmonary artery suggests pulmonary hypertension.  Small bilateral pleural effusions. Cardiomegaly. Pacemaker/AICD in place.  Diverticulosis.    ARCADIO KEARNEY M.D, ATTENDING RADIOLOGIST  This document has been electronically signed. May  1 2018  3:07AM    < end of copied text >

## 2018-05-01 NOTE — PROGRESS NOTE ADULT - PROBLEM SELECTOR PLAN 1
Continue ASA, continue brilinta 90mg bid  half dose atorvastatin 40mg due to transaminitis Continue ASA, continue Brilinta 90mg bid  half dose atorvastatin 40mg due to transaminitis  -RHC/LHC urgently today  -f/u TTE

## 2018-05-01 NOTE — H&P ADULT - PMH
AAA (abdominal aortic aneurysm) without rupture    Coronary artery disease involving native coronary artery of native heart without angina pectoris    Heart failure of unknown etiology    High cholesterol    Myocardial infarction, unspecified MI type, unspecified artery

## 2018-05-01 NOTE — CONSULT NOTE ADULT - ASSESSMENT
63M PMHx CAD/MI/cardiac arrest/PCI x 10 (2005), HFrEF s/p AICD ([employer]) with battery change 3 years ago, AAA, gastic ulcers, HLD, and BPH a/w STEMI equivalent new LBBB found to have triple vessel disease per report not amenable to fixation pending LVAD evaluation c/b AAA and peripheral vascular disease. Would recommend further evaluation of PAD once IABP is removed. For LVAD workup, would recommend central cannulation as opposed to peripheral access for placing on pump.       - obtain JOE/PVR once IABP removed   - obtain BL LE vascular arterial U/S obtain once IABP removed  - Serial RLE neurovascular exam while IABP in place  - will follow  - case d/w Dr. Medrano.

## 2018-05-01 NOTE — ED PROVIDER NOTE - PHYSICAL EXAMINATION
Gen: Awake, c/o discomfort  Head: NC, AT, EOMI, normal lids/conjunctiva  ENT: normal hearing, patent oropharynx, MMM  Neck: supple, no tenderness/meningismus, FROM  Pulm: Bilateral clear BS, normal resp effort, no wheeze/stridor/retractions  CV: RRR, no M/R/G, +dist pulses  Abd: soft, NT/ND, +BS, no guarding/rebound tenderness  Mskel: +bilateral LE edema, family states is baseline  Skin: no rash  Neuro: AAOx3, no sensory/motor deficits

## 2018-05-02 DIAGNOSIS — R07.9 CHEST PAIN, UNSPECIFIED: ICD-10-CM

## 2018-05-02 DIAGNOSIS — I24.9 ACUTE ISCHEMIC HEART DISEASE, UNSPECIFIED: ICD-10-CM

## 2018-05-02 DIAGNOSIS — R11.2 NAUSEA WITH VOMITING, UNSPECIFIED: ICD-10-CM

## 2018-05-02 DIAGNOSIS — R69 ILLNESS, UNSPECIFIED: ICD-10-CM

## 2018-05-02 DIAGNOSIS — K59.00 CONSTIPATION, UNSPECIFIED: ICD-10-CM

## 2018-05-02 LAB
ALBUMIN SERPL ELPH-MCNC: 3.3 G/DL — SIGNIFICANT CHANGE UP (ref 3.3–5)
ALBUMIN SERPL ELPH-MCNC: 3.3 G/DL — SIGNIFICANT CHANGE UP (ref 3.3–5)
ALBUMIN SERPL ELPH-MCNC: 3.4 G/DL — SIGNIFICANT CHANGE UP (ref 3.3–5)
ALBUMIN SERPL ELPH-MCNC: 3.4 G/DL — SIGNIFICANT CHANGE UP (ref 3.3–5)
ALP SERPL-CCNC: 93 U/L — SIGNIFICANT CHANGE UP (ref 40–120)
ALP SERPL-CCNC: 93 U/L — SIGNIFICANT CHANGE UP (ref 40–120)
ALP SERPL-CCNC: 94 U/L — SIGNIFICANT CHANGE UP (ref 40–120)
ALP SERPL-CCNC: 96 U/L — SIGNIFICANT CHANGE UP (ref 40–120)
ALT FLD-CCNC: 1708 U/L — HIGH (ref 10–45)
ALT FLD-CCNC: 1729 U/L — HIGH (ref 10–45)
ALT FLD-CCNC: 1823 U/L — HIGH (ref 10–45)
ALT FLD-CCNC: 2123 U/L — HIGH (ref 10–45)
AMPHET UR-MCNC: NEGATIVE — SIGNIFICANT CHANGE UP
ANION GAP SERPL CALC-SCNC: 15 MMOL/L — SIGNIFICANT CHANGE UP (ref 5–17)
ANION GAP SERPL CALC-SCNC: 16 MMOL/L — SIGNIFICANT CHANGE UP (ref 5–17)
APAP SERPL-MCNC: <15 UG/ML — SIGNIFICANT CHANGE UP (ref 10–30)
APPEARANCE UR: CLEAR — SIGNIFICANT CHANGE UP
APTT BLD: 46.1 SEC — HIGH (ref 27.5–37.4)
APTT BLD: 62.4 SEC — HIGH (ref 27.5–37.4)
APTT BLD: 63.1 SEC — HIGH (ref 27.5–37.4)
AST SERPL-CCNC: 1468 U/L — HIGH (ref 10–40)
AST SERPL-CCNC: 1683 U/L — HIGH (ref 10–40)
AST SERPL-CCNC: 1884 U/L — HIGH (ref 10–40)
AST SERPL-CCNC: 3125 U/L — HIGH (ref 10–40)
BARBITURATES UR SCN-MCNC: NEGATIVE — SIGNIFICANT CHANGE UP
BASE EXCESS BLDMV CALC-SCNC: 1.2 MMOL/L — SIGNIFICANT CHANGE UP (ref -3–3)
BASE EXCESS BLDMV CALC-SCNC: 1.4 MMOL/L — SIGNIFICANT CHANGE UP (ref -3–3)
BASE EXCESS BLDMV CALC-SCNC: 1.7 MMOL/L — SIGNIFICANT CHANGE UP (ref -3–3)
BASE EXCESS BLDMV CALC-SCNC: 1.8 MMOL/L — SIGNIFICANT CHANGE UP (ref -3–3)
BASE EXCESS BLDMV CALC-SCNC: 2.4 MMOL/L — SIGNIFICANT CHANGE UP (ref -3–3)
BASE EXCESS BLDMV CALC-SCNC: 2.6 MMOL/L — SIGNIFICANT CHANGE UP (ref -3–3)
BASE EXCESS BLDMV CALC-SCNC: 2.8 MMOL/L — SIGNIFICANT CHANGE UP (ref -3–3)
BASE EXCESS BLDMV CALC-SCNC: 3.4 MMOL/L — HIGH (ref -3–3)
BENZODIAZ UR-MCNC: NEGATIVE — SIGNIFICANT CHANGE UP
BILIRUB DIRECT SERPL-MCNC: 0.8 MG/DL — HIGH (ref 0–0.2)
BILIRUB SERPL-MCNC: 2.3 MG/DL — HIGH (ref 0.2–1.2)
BILIRUB SERPL-MCNC: 2.3 MG/DL — HIGH (ref 0.2–1.2)
BILIRUB SERPL-MCNC: 2.4 MG/DL — HIGH (ref 0.2–1.2)
BILIRUB SERPL-MCNC: 2.5 MG/DL — HIGH (ref 0.2–1.2)
BILIRUB UR-MCNC: NEGATIVE — SIGNIFICANT CHANGE UP
BLD GP AB SCN SERPL QL: NEGATIVE — SIGNIFICANT CHANGE UP
BUN SERPL-MCNC: 46 MG/DL — HIGH (ref 7–23)
BUN SERPL-MCNC: 49 MG/DL — HIGH (ref 7–23)
BUN SERPL-MCNC: 50 MG/DL — HIGH (ref 7–23)
BUN SERPL-MCNC: 54 MG/DL — HIGH (ref 7–23)
BUN SERPL-MCNC: 55 MG/DL — HIGH (ref 7–23)
CALCIUM SERPL-MCNC: 7.5 MG/DL — LOW (ref 8.4–10.5)
CALCIUM SERPL-MCNC: 7.6 MG/DL — LOW (ref 8.4–10.5)
CALCIUM SERPL-MCNC: 7.6 MG/DL — LOW (ref 8.4–10.5)
CALCIUM SERPL-MCNC: 7.7 MG/DL — LOW (ref 8.4–10.5)
CALCIUM SERPL-MCNC: 7.8 MG/DL — LOW (ref 8.4–10.5)
CHLORIDE SERPL-SCNC: 95 MMOL/L — LOW (ref 96–108)
CHLORIDE SERPL-SCNC: 96 MMOL/L — SIGNIFICANT CHANGE UP (ref 96–108)
CHLORIDE SERPL-SCNC: 97 MMOL/L — SIGNIFICANT CHANGE UP (ref 96–108)
CHLORIDE SERPL-SCNC: 99 MMOL/L — SIGNIFICANT CHANGE UP (ref 96–108)
CHLORIDE SERPL-SCNC: 99 MMOL/L — SIGNIFICANT CHANGE UP (ref 96–108)
CK SERPL-CCNC: 510 U/L — HIGH (ref 30–200)
CMV IGG FLD QL: 1.6 U/ML — HIGH
CMV IGG SERPL-IMP: POSITIVE
CMV IGM FLD-ACNC: <8 AU/ML — SIGNIFICANT CHANGE UP
CMV IGM SERPL QL: NEGATIVE — SIGNIFICANT CHANGE UP
CO2 BLDMV-SCNC: 26 MMOL/L — SIGNIFICANT CHANGE UP (ref 21–29)
CO2 BLDMV-SCNC: 26 MMOL/L — SIGNIFICANT CHANGE UP (ref 21–29)
CO2 BLDMV-SCNC: 27 MMOL/L — SIGNIFICANT CHANGE UP (ref 21–29)
CO2 BLDMV-SCNC: 27 MMOL/L — SIGNIFICANT CHANGE UP (ref 21–29)
CO2 BLDMV-SCNC: 28 MMOL/L — SIGNIFICANT CHANGE UP (ref 21–29)
CO2 SERPL-SCNC: 22 MMOL/L — SIGNIFICANT CHANGE UP (ref 22–31)
CO2 SERPL-SCNC: 23 MMOL/L — SIGNIFICANT CHANGE UP (ref 22–31)
CO2 SERPL-SCNC: 23 MMOL/L — SIGNIFICANT CHANGE UP (ref 22–31)
CO2 SERPL-SCNC: 24 MMOL/L — SIGNIFICANT CHANGE UP (ref 22–31)
CO2 SERPL-SCNC: 25 MMOL/L — SIGNIFICANT CHANGE UP (ref 22–31)
COCAINE METAB.OTHER UR-MCNC: NEGATIVE — SIGNIFICANT CHANGE UP
COLOR SPEC: SIGNIFICANT CHANGE UP
CREAT SERPL-MCNC: 1.64 MG/DL — HIGH (ref 0.5–1.3)
CREAT SERPL-MCNC: 1.75 MG/DL — HIGH (ref 0.5–1.3)
CREAT SERPL-MCNC: 1.81 MG/DL — HIGH (ref 0.5–1.3)
CREAT SERPL-MCNC: 2.12 MG/DL — HIGH (ref 0.5–1.3)
CREAT SERPL-MCNC: 2.19 MG/DL — HIGH (ref 0.5–1.3)
CRP SERPL-MCNC: 4 MG/DL — HIGH (ref 0–0.4)
CRP SERPL-MCNC: 4.5 MG/DL — HIGH (ref 0–0.4)
DIFF PNL FLD: NEGATIVE — SIGNIFICANT CHANGE UP
EBV EA AB SER IA-ACNC: 14.4 U/ML — HIGH
EBV EA AB TITR SER IF: POSITIVE
EBV EA IGG SER-ACNC: POSITIVE
EBV NA IGG SER IA-ACNC: >600 U/ML — HIGH
EBV PATRN SPEC IB-IMP: SIGNIFICANT CHANGE UP
EBV VCA IGG AVIDITY SER QL IA: POSITIVE
EBV VCA IGM SER IA-ACNC: 690 U/ML — HIGH
EBV VCA IGM SER IA-ACNC: <10 U/ML — SIGNIFICANT CHANGE UP
EBV VCA IGM TITR FLD: NEGATIVE — SIGNIFICANT CHANGE UP
ERYTHROCYTE [SEDIMENTATION RATE] IN BLOOD: 7 MM/HR — SIGNIFICANT CHANGE UP (ref 0–20)
GAS PNL BLDMV: SIGNIFICANT CHANGE UP
GLUCOSE SERPL-MCNC: 110 MG/DL — HIGH (ref 70–99)
GLUCOSE SERPL-MCNC: 118 MG/DL — HIGH (ref 70–99)
GLUCOSE SERPL-MCNC: 128 MG/DL — HIGH (ref 70–99)
GLUCOSE SERPL-MCNC: 141 MG/DL — HIGH (ref 70–99)
GLUCOSE SERPL-MCNC: 170 MG/DL — HIGH (ref 70–99)
GLUCOSE UR QL: NEGATIVE — SIGNIFICANT CHANGE UP
HAV IGG SER QL IA: SIGNIFICANT CHANGE UP
HAV IGM SER-ACNC: SIGNIFICANT CHANGE UP
HBV CORE IGM SER-ACNC: SIGNIFICANT CHANGE UP
HBV SURFACE AB SER-ACNC: SIGNIFICANT CHANGE UP
HBV SURFACE AG SER-ACNC: SIGNIFICANT CHANGE UP
HCO3 BLDMV-SCNC: 24 MMOL/L — SIGNIFICANT CHANGE UP (ref 20–28)
HCO3 BLDMV-SCNC: 25 MMOL/L — SIGNIFICANT CHANGE UP (ref 20–28)
HCO3 BLDMV-SCNC: 26 MMOL/L — SIGNIFICANT CHANGE UP (ref 20–28)
HCO3 BLDMV-SCNC: 27 MMOL/L — SIGNIFICANT CHANGE UP (ref 20–28)
HCO3 BLDMV-SCNC: 27 MMOL/L — SIGNIFICANT CHANGE UP (ref 20–28)
HCT VFR BLD CALC: 34.9 % — LOW (ref 39–50)
HCT VFR BLD CALC: 35.7 % — LOW (ref 39–50)
HCT VFR BLD CALC: 36.3 % — LOW (ref 39–50)
HCT VFR BLD CALC: 37 % — LOW (ref 39–50)
HCV AB S/CO SERPL IA: 0.08 S/CO — SIGNIFICANT CHANGE UP
HCV AB SERPL-IMP: SIGNIFICANT CHANGE UP
HGB BLD-MCNC: 11.2 G/DL — LOW (ref 13–17)
HGB BLD-MCNC: 11.4 G/DL — LOW (ref 13–17)
HGB BLD-MCNC: 11.5 G/DL — LOW (ref 13–17)
HGB BLD-MCNC: 11.5 G/DL — LOW (ref 13–17)
HIV 1+2 AB+HIV1 P24 AG SERPL QL IA: SIGNIFICANT CHANGE UP
HOROWITZ INDEX BLDMV+IHG-RTO: 21 — SIGNIFICANT CHANGE UP
HSV1 IGG SER-ACNC: 49.2 INDEX — HIGH
HSV1 IGG SERPL QL IA: POSITIVE
HSV2 IGG FLD-ACNC: 0.07 INDEX — SIGNIFICANT CHANGE UP
HSV2 IGG SERPL QL IA: NEGATIVE — SIGNIFICANT CHANGE UP
INR BLD: 1.89 RATIO — HIGH (ref 0.88–1.16)
INR BLD: 2.02 RATIO — HIGH (ref 0.88–1.16)
IRON SATN MFR SERPL: 18 UG/DL — LOW (ref 45–165)
IRON SATN MFR SERPL: 19 UG/DL — LOW (ref 45–165)
IRON SATN MFR SERPL: 6 % — LOW (ref 16–55)
IRON SATN MFR SERPL: 6 % — LOW (ref 16–55)
KETONES UR-MCNC: NEGATIVE — SIGNIFICANT CHANGE UP
LACTATE SERPL-SCNC: 1.5 MMOL/L — SIGNIFICANT CHANGE UP (ref 0.7–2)
LACTATE SERPL-SCNC: 1.6 MMOL/L — SIGNIFICANT CHANGE UP (ref 0.7–2)
LACTATE SERPL-SCNC: 1.7 MMOL/L — SIGNIFICANT CHANGE UP (ref 0.7–2)
LACTATE SERPL-SCNC: 2.1 MMOL/L — HIGH (ref 0.7–2)
LDH SERPL L TO P-CCNC: >2250 U/L — HIGH (ref 50–242)
LEUKOCYTE ESTERASE UR-ACNC: NEGATIVE — SIGNIFICANT CHANGE UP
MAGNESIUM SERPL-MCNC: 2 MG/DL — SIGNIFICANT CHANGE UP (ref 1.6–2.6)
MAGNESIUM SERPL-MCNC: 2.5 MG/DL — SIGNIFICANT CHANGE UP (ref 1.6–2.6)
MAGNESIUM SERPL-MCNC: 2.6 MG/DL — SIGNIFICANT CHANGE UP (ref 1.6–2.6)
MCHC RBC-ENTMCNC: 20.4 PG — LOW (ref 27–34)
MCHC RBC-ENTMCNC: 20.6 PG — LOW (ref 27–34)
MCHC RBC-ENTMCNC: 20.7 PG — LOW (ref 27–34)
MCHC RBC-ENTMCNC: 20.8 PG — LOW (ref 27–34)
MCHC RBC-ENTMCNC: 31.2 GM/DL — LOW (ref 32–36)
MCHC RBC-ENTMCNC: 31.6 GM/DL — LOW (ref 32–36)
MCHC RBC-ENTMCNC: 32 GM/DL — SIGNIFICANT CHANGE UP (ref 32–36)
MCHC RBC-ENTMCNC: 32.1 GM/DL — SIGNIFICANT CHANGE UP (ref 32–36)
MCV RBC AUTO: 64.7 FL — LOW (ref 80–100)
MCV RBC AUTO: 65 FL — LOW (ref 80–100)
MCV RBC AUTO: 65.3 FL — LOW (ref 80–100)
MCV RBC AUTO: 65.3 FL — LOW (ref 80–100)
METHADONE UR-MCNC: NEGATIVE — SIGNIFICANT CHANGE UP
MEV IGG SER-ACNC: >300 AU/ML — SIGNIFICANT CHANGE UP
MEV IGG+IGM SER-IMP: POSITIVE — SIGNIFICANT CHANGE UP
MUV AB SER-ACNC: POSITIVE — SIGNIFICANT CHANGE UP
MUV IGG FLD-ACNC: 86.6 AU/ML — SIGNIFICANT CHANGE UP
NITRITE UR-MCNC: NEGATIVE — SIGNIFICANT CHANGE UP
O2 CT VFR BLD CALC: 32 MMHG — SIGNIFICANT CHANGE UP (ref 30–65)
O2 CT VFR BLD CALC: 33 MMHG — SIGNIFICANT CHANGE UP (ref 30–65)
O2 CT VFR BLD CALC: 35 MMHG — SIGNIFICANT CHANGE UP (ref 30–65)
O2 CT VFR BLD CALC: 36 MMHG — SIGNIFICANT CHANGE UP (ref 30–65)
O2 CT VFR BLD CALC: 37 MMHG — SIGNIFICANT CHANGE UP (ref 30–65)
O2 CT VFR BLD CALC: 38 MMHG — SIGNIFICANT CHANGE UP (ref 30–65)
O2 CT VFR BLD CALC: 39 MMHG — SIGNIFICANT CHANGE UP (ref 30–65)
O2 CT VFR BLD CALC: 39 MMHG — SIGNIFICANT CHANGE UP (ref 30–65)
OPIATES UR-MCNC: NEGATIVE — SIGNIFICANT CHANGE UP
OXYCODONE UR-MCNC: NEGATIVE — SIGNIFICANT CHANGE UP
PCO2 BLDMV: 35 MMHG — SIGNIFICANT CHANGE UP (ref 30–65)
PCO2 BLDMV: 38 MMHG — SIGNIFICANT CHANGE UP (ref 30–65)
PCO2 BLDMV: 38 MMHG — SIGNIFICANT CHANGE UP (ref 30–65)
PCO2 BLDMV: 39 MMHG — SIGNIFICANT CHANGE UP (ref 30–65)
PCO2 BLDMV: 40 MMHG — SIGNIFICANT CHANGE UP (ref 30–65)
PCO2 BLDMV: 41 MMHG — SIGNIFICANT CHANGE UP (ref 30–65)
PCP UR-MCNC: NEGATIVE — SIGNIFICANT CHANGE UP
PH BLDMV: 7.42 — SIGNIFICANT CHANGE UP (ref 7.32–7.45)
PH BLDMV: 7.43 — SIGNIFICANT CHANGE UP (ref 7.32–7.45)
PH BLDMV: 7.44 — SIGNIFICANT CHANGE UP (ref 7.32–7.45)
PH BLDMV: 7.44 — SIGNIFICANT CHANGE UP (ref 7.32–7.45)
PH BLDMV: 7.46 — HIGH (ref 7.32–7.45)
PH BLDMV: 7.46 — HIGH (ref 7.32–7.45)
PH UR: 6 — SIGNIFICANT CHANGE UP (ref 5–8)
PHOSPHATE SERPL-MCNC: 2 MG/DL — LOW (ref 2.5–4.5)
PHOSPHATE SERPL-MCNC: 2.4 MG/DL — LOW (ref 2.5–4.5)
PHOSPHATE SERPL-MCNC: 3.2 MG/DL — SIGNIFICANT CHANGE UP (ref 2.5–4.5)
PLATELET # BLD AUTO: 141 K/UL — LOW (ref 150–400)
PLATELET # BLD AUTO: 148 K/UL — LOW (ref 150–400)
PLATELET # BLD AUTO: 151 K/UL — SIGNIFICANT CHANGE UP (ref 150–400)
PLATELET # BLD AUTO: 169 K/UL — SIGNIFICANT CHANGE UP (ref 150–400)
POTASSIUM SERPL-MCNC: 3.1 MMOL/L — LOW (ref 3.5–5.3)
POTASSIUM SERPL-MCNC: 3.1 MMOL/L — LOW (ref 3.5–5.3)
POTASSIUM SERPL-MCNC: 3.3 MMOL/L — LOW (ref 3.5–5.3)
POTASSIUM SERPL-MCNC: 3.5 MMOL/L — SIGNIFICANT CHANGE UP (ref 3.5–5.3)
POTASSIUM SERPL-MCNC: 3.8 MMOL/L — SIGNIFICANT CHANGE UP (ref 3.5–5.3)
POTASSIUM SERPL-SCNC: 3.1 MMOL/L — LOW (ref 3.5–5.3)
POTASSIUM SERPL-SCNC: 3.1 MMOL/L — LOW (ref 3.5–5.3)
POTASSIUM SERPL-SCNC: 3.3 MMOL/L — LOW (ref 3.5–5.3)
POTASSIUM SERPL-SCNC: 3.5 MMOL/L — SIGNIFICANT CHANGE UP (ref 3.5–5.3)
POTASSIUM SERPL-SCNC: 3.8 MMOL/L — SIGNIFICANT CHANGE UP (ref 3.5–5.3)
PROT SERPL-MCNC: 5.6 G/DL — LOW (ref 6–8.3)
PROT SERPL-MCNC: 5.7 G/DL — LOW (ref 6–8.3)
PROT SERPL-MCNC: 5.8 G/DL — LOW (ref 6–8.3)
PROT SERPL-MCNC: 5.8 G/DL — LOW (ref 6–8.3)
PROT UR-MCNC: NEGATIVE — SIGNIFICANT CHANGE UP
PROTHROM AB SERPL-ACNC: 20.9 SEC — HIGH (ref 9.8–12.7)
PROTHROM AB SERPL-ACNC: 22.1 SEC — HIGH (ref 9.8–12.7)
RBC # BLD: 5.37 M/UL — SIGNIFICANT CHANGE UP (ref 4.2–5.8)
RBC # BLD: 5.51 M/UL — SIGNIFICANT CHANGE UP (ref 4.2–5.8)
RBC # BLD: 5.57 M/UL — SIGNIFICANT CHANGE UP (ref 4.2–5.8)
RBC # BLD: 5.66 M/UL — SIGNIFICANT CHANGE UP (ref 4.2–5.8)
RBC # FLD: 14.9 % — HIGH (ref 10.3–14.5)
RBC # FLD: 15 % — HIGH (ref 10.3–14.5)
RBC # FLD: 15.2 % — HIGH (ref 10.3–14.5)
RBC # FLD: 15.2 % — HIGH (ref 10.3–14.5)
RH IG SCN BLD-IMP: POSITIVE — SIGNIFICANT CHANGE UP
RHEUMATOID FACT SERPL-ACNC: <7 IU/ML — SIGNIFICANT CHANGE UP (ref 0–13)
RUBV IGG SER-ACNC: 13 INDEX — SIGNIFICANT CHANGE UP
RUBV IGG SER-IMP: POSITIVE — SIGNIFICANT CHANGE UP
SAO2 % BLDMV: 56 % — LOW (ref 60–90)
SAO2 % BLDMV: 56 % — LOW (ref 60–90)
SAO2 % BLDMV: 59 % — LOW (ref 60–90)
SAO2 % BLDMV: 62 % — SIGNIFICANT CHANGE UP (ref 60–90)
SAO2 % BLDMV: 62 % — SIGNIFICANT CHANGE UP (ref 60–90)
SAO2 % BLDMV: 65 % — SIGNIFICANT CHANGE UP (ref 60–90)
SAO2 % BLDMV: 66 % — SIGNIFICANT CHANGE UP (ref 60–90)
SAO2 % BLDMV: 67 % — SIGNIFICANT CHANGE UP (ref 60–90)
SODIUM SERPL-SCNC: 134 MMOL/L — LOW (ref 135–145)
SODIUM SERPL-SCNC: 134 MMOL/L — LOW (ref 135–145)
SODIUM SERPL-SCNC: 138 MMOL/L — SIGNIFICANT CHANGE UP (ref 135–145)
SP GR SPEC: 1.01 — LOW (ref 1.01–1.02)
THC UR QL: NEGATIVE — SIGNIFICANT CHANGE UP
TIBC SERPL-MCNC: 319 UG/DL — SIGNIFICANT CHANGE UP (ref 220–430)
TIBC SERPL-MCNC: 344 UG/DL — SIGNIFICANT CHANGE UP (ref 220–430)
TROPONIN T SERPL-MCNC: 2.32 NG/ML — HIGH (ref 0–0.06)
TSH SERPL-MCNC: 2.19 UIU/ML — SIGNIFICANT CHANGE UP (ref 0.27–4.2)
UIBC SERPL-MCNC: 301 UG/DL — SIGNIFICANT CHANGE UP (ref 110–370)
UIBC SERPL-MCNC: 325 UG/DL — SIGNIFICANT CHANGE UP (ref 110–370)
URATE SERPL-MCNC: 17.1 MG/DL — HIGH (ref 3.4–8.8)
UROBILINOGEN FLD QL: NEGATIVE — SIGNIFICANT CHANGE UP
VZV IGG FLD QL IA: 1750 INDEX — SIGNIFICANT CHANGE UP
VZV IGG FLD QL IA: POSITIVE — SIGNIFICANT CHANGE UP
WBC # BLD: 12.5 K/UL — HIGH (ref 3.8–10.5)
WBC # BLD: 13.6 K/UL — HIGH (ref 3.8–10.5)
WBC # BLD: 15.9 K/UL — HIGH (ref 3.8–10.5)
WBC # BLD: 17.7 K/UL — HIGH (ref 3.8–10.5)
WBC # FLD AUTO: 12.5 K/UL — HIGH (ref 3.8–10.5)
WBC # FLD AUTO: 13.6 K/UL — HIGH (ref 3.8–10.5)
WBC # FLD AUTO: 15.9 K/UL — HIGH (ref 3.8–10.5)
WBC # FLD AUTO: 17.7 K/UL — HIGH (ref 3.8–10.5)

## 2018-05-02 PROCEDURE — 99223 1ST HOSP IP/OBS HIGH 75: CPT | Mod: GC

## 2018-05-02 PROCEDURE — 93010 ELECTROCARDIOGRAM REPORT: CPT

## 2018-05-02 PROCEDURE — 99291 CRITICAL CARE FIRST HOUR: CPT

## 2018-05-02 PROCEDURE — 93880 EXTRACRANIAL BILAT STUDY: CPT | Mod: 26

## 2018-05-02 PROCEDURE — 93925 LOWER EXTREMITY STUDY: CPT | Mod: 26

## 2018-05-02 PROCEDURE — 99222 1ST HOSP IP/OBS MODERATE 55: CPT

## 2018-05-02 PROCEDURE — 71045 X-RAY EXAM CHEST 1 VIEW: CPT | Mod: 26,59

## 2018-05-02 PROCEDURE — 99232 SBSQ HOSP IP/OBS MODERATE 35: CPT | Mod: GC

## 2018-05-02 RX ORDER — POTASSIUM CHLORIDE 20 MEQ
40 PACKET (EA) ORAL ONCE
Qty: 0 | Refills: 0 | Status: COMPLETED | OUTPATIENT
Start: 2018-05-02 | End: 2018-05-02

## 2018-05-02 RX ORDER — POTASSIUM CHLORIDE 20 MEQ
20 PACKET (EA) ORAL ONCE
Qty: 0 | Refills: 0 | Status: COMPLETED | OUTPATIENT
Start: 2018-05-02 | End: 2018-05-02

## 2018-05-02 RX ORDER — AMIODARONE HYDROCHLORIDE 400 MG/1
1 TABLET ORAL
Qty: 900 | Refills: 0 | Status: DISCONTINUED | OUTPATIENT
Start: 2018-05-02 | End: 2018-05-03

## 2018-05-02 RX ORDER — AMIODARONE HYDROCHLORIDE 400 MG/1
0.5 TABLET ORAL
Qty: 900 | Refills: 0 | Status: DISCONTINUED | OUTPATIENT
Start: 2018-05-02 | End: 2018-05-02

## 2018-05-02 RX ORDER — POTASSIUM CHLORIDE 20 MEQ
40 PACKET (EA) ORAL EVERY 4 HOURS
Qty: 0 | Refills: 0 | Status: COMPLETED | OUTPATIENT
Start: 2018-05-02 | End: 2018-05-02

## 2018-05-02 RX ORDER — MAGNESIUM SULFATE 500 MG/ML
1 VIAL (ML) INJECTION ONCE
Qty: 0 | Refills: 0 | Status: DISCONTINUED | OUTPATIENT
Start: 2018-05-02 | End: 2018-05-02

## 2018-05-02 RX ORDER — POTASSIUM CHLORIDE 20 MEQ
20 PACKET (EA) ORAL
Qty: 0 | Refills: 0 | Status: COMPLETED | OUTPATIENT
Start: 2018-05-02 | End: 2018-05-02

## 2018-05-02 RX ORDER — POTASSIUM CHLORIDE 20 MEQ
40 PACKET (EA) ORAL ONCE
Qty: 0 | Refills: 0 | Status: DISCONTINUED | OUTPATIENT
Start: 2018-05-02 | End: 2018-05-02

## 2018-05-02 RX ORDER — MAGNESIUM SULFATE 500 MG/ML
1 VIAL (ML) INJECTION ONCE
Qty: 0 | Refills: 0 | Status: COMPLETED | OUTPATIENT
Start: 2018-05-02 | End: 2018-05-02

## 2018-05-02 RX ORDER — AMIODARONE HYDROCHLORIDE 400 MG/1
0.5 TABLET ORAL
Qty: 900 | Refills: 0 | Status: DISCONTINUED | OUTPATIENT
Start: 2018-05-02 | End: 2018-05-03

## 2018-05-02 RX ORDER — BUMETANIDE 0.25 MG/ML
1 INJECTION INTRAMUSCULAR; INTRAVENOUS ONCE
Qty: 0 | Refills: 0 | Status: COMPLETED | OUTPATIENT
Start: 2018-05-02 | End: 2018-05-02

## 2018-05-02 RX ORDER — AMIODARONE HYDROCHLORIDE 400 MG/1
150 TABLET ORAL ONCE
Qty: 0 | Refills: 0 | Status: COMPLETED | OUTPATIENT
Start: 2018-05-02 | End: 2018-05-02

## 2018-05-02 RX ADMIN — AMIODARONE HYDROCHLORIDE 33.33 MG/MIN: 400 TABLET ORAL at 14:55

## 2018-05-02 RX ADMIN — Medication 50 MILLIEQUIVALENT(S): at 14:44

## 2018-05-02 RX ADMIN — Medication 40 MILLIEQUIVALENT(S): at 06:04

## 2018-05-02 RX ADMIN — Medication 40 MILLIEQUIVALENT(S): at 14:57

## 2018-05-02 RX ADMIN — HEPARIN SODIUM 1200 UNIT(S)/HR: 5000 INJECTION INTRAVENOUS; SUBCUTANEOUS at 20:00

## 2018-05-02 RX ADMIN — Medication 81 MILLIGRAM(S): at 12:54

## 2018-05-02 RX ADMIN — AMIODARONE HYDROCHLORIDE 16.67 MG/MIN: 400 TABLET ORAL at 21:33

## 2018-05-02 RX ADMIN — Medication 50 MILLIEQUIVALENT(S): at 17:22

## 2018-05-02 RX ADMIN — BUMETANIDE 1 MILLIGRAM(S): 0.25 INJECTION INTRAMUSCULAR; INTRAVENOUS at 18:38

## 2018-05-02 RX ADMIN — Medication 100 MILLIEQUIVALENT(S): at 06:04

## 2018-05-02 RX ADMIN — TICAGRELOR 90 MILLIGRAM(S): 90 TABLET ORAL at 06:04

## 2018-05-02 RX ADMIN — Medication 50 MILLIEQUIVALENT(S): at 18:28

## 2018-05-02 RX ADMIN — AMIODARONE HYDROCHLORIDE 600 MILLIGRAM(S): 400 TABLET ORAL at 14:46

## 2018-05-02 RX ADMIN — HEPARIN SODIUM 1200 UNIT(S)/HR: 5000 INJECTION INTRAVENOUS; SUBCUTANEOUS at 11:16

## 2018-05-02 RX ADMIN — Medication 40 MILLIEQUIVALENT(S): at 18:38

## 2018-05-02 RX ADMIN — Medication 30 MILLILITER(S): at 21:24

## 2018-05-02 RX ADMIN — HEPARIN SODIUM 1200 UNIT(S)/HR: 5000 INJECTION INTRAVENOUS; SUBCUTANEOUS at 04:47

## 2018-05-02 RX ADMIN — Medication 100 MILLIEQUIVALENT(S): at 04:32

## 2018-05-02 RX ADMIN — Medication 100 GRAM(S): at 14:46

## 2018-05-02 RX ADMIN — TICAGRELOR 90 MILLIGRAM(S): 90 TABLET ORAL at 18:38

## 2018-05-02 NOTE — PROGRESS NOTE ADULT - ASSESSMENT
64 y/o M with CAD s/p MI and cardiac arrests, remote stents x 10 (2005), HFrEF s/p AICD ([employer]) with battery change 3 years ago, AAA, gastic ulcers, HLD, and BPH admitted for NSTEMI c/b acute on chronic decompensated systolic heart failure, requiring CCU for acute pulmonary edema    Neuro:  - Awake, alert, no issues.     CV:  Acute on Chronic Systolic Heart Failure  - EF approx 17% w/ severe LV dysfunction and MR  - Continue diuresis  - IABP, PA catheter in place  - Continue Milrinone gtt  - LVAD work up  NSTEMI  - S/p catheterization revealing triple vessel disease  - CT surgery, heart failure consulting  - Continue aspirin, brilinta, atorvastatin, full heparin anticoagulation    Pulm:  Acute Pulmonary Edema  - Improved after diuresis  - Monitor off BiPAP  - Oxygen PRN to maintain SpO2 >92%    GI:  - DASH diet  - Significant transaminitis, continue to trend q6h    :   Acute Kidney Injury  - Creatinine 2.24 on presentation, unknown baseline  - Continue to trend BUN/Cr  - Diuresed well overnight. Holding lasix drip, keep net negative.   - Maintain strict Ins and Outs    Heme:  - Trend CBC  - DAPT, heparin gtt    Endo:  - A1c, TSH normal  - Monitor glucose on labs    ID:  - Afebrile, no signs of infection  - Monitor for fevers, leukocytosis.    Dispo; CCU 64 y/o M with CAD s/p MI and cardiac arrests, remote stents x 10 (2005), HFrEF s/p AICD ([employer]) with battery change 3 years ago, AAA, gastic ulcers, HLD, and BPH admitted for NSTEMI c/b acute on chronic decompensated systolic heart failure, requiring CCU for acute pulmonary edema    Neuro:  - Awake, alert, no issues.     CV:  Acute on Chronic Systolic Heart Failure  - EF approx 17% w/ severe LV dysfunction and MR  - CVP under 5, lasix on hold  - IABP, PA catheter in place  - Continue Milrinone gtt  - LVAD work up per CT surg  NSTEMI  - S/p catheterization revealing triple vessel disease  - CT surgery, heart failure consulting  - Continue aspirin, brilinta, atorvastatin, full heparin anticoagulation    Pulm:  Acute Pulmonary Edema  - Improved after diuresis  - Monitor off BiPAP  - Oxygen PRN to maintain SpO2 >92%    GI:  - DASH diet  - Significant transaminitis, continue to trend q6h    :   Acute Kidney Injury  - Creatinine 2.24 on presentation, unknown baseline, stable in low 2s currently  - Continue to trend BUN/Cr  - Diuresed well overnight. Holding lasix drip, keep net negative.   - Maintain strict Ins and Outs    Heme:  - Trend CBC  - DAPT, heparin gtt    Endo:  - A1c, TSH normal  - Monitor glucose on labs    ID:  - Afebrile, no signs of infection  - Monitor for fevers, leukocytosis.    Dispo; CCU 62 y/o M with CAD s/p MI and cardiac arrests, remote stents x 10 (2005), HFrEF s/p AICD ([employer]) with battery change 3 years ago, AAA, gastic ulcers, HLD, and BPH admitted for NSTEMI c/b acute on chronic decompensated systolic heart failure, requiring CCU for acute pulmonary edema    Neuro:  - Awake, alert, no issues.     CV:  Acute on Chronic Systolic Heart Failure  - EF approx 17% w/ severe LV dysfunction and MR  - CVP under 5, lasix on hold  - IABP, PA catheter in place  - Continue Milrinone gtt  - LVAD work up per CT surg  NSTEMI  - S/p catheterization revealing triple vessel disease  - CT surgery, heart failure consulting  - Continue aspirin, brilinta, atorvastatin, full heparin anticoagulation    Pulm:  Acute Pulmonary Edema  - Improved after diuresis  - Monitor off BiPAP  - Oxygen PRN to maintain SpO2 >92%    GI:  - DASH diet  - Significant transaminitis likely from shock liver, continue to trend    :   Acute Kidney Injury  - Creatinine 2.24 on presentation, unknown baseline, stable in low 2s currently  - Continue to trend BUN/Cr  - Diuresed well overnight. Holding lasix drip, keep net negative.   - Maintain strict Ins and Outs    Heme:  - Trend CBC  - INR trending up in the setting of acute liver dysfunction  - DAPT, heparin gtt    Endo:  - A1c, TSH normal  - Monitor glucose on labs    ID:  - Afebrile, no signs of infection  - Monitor for fevers, leukocytosis.    Dispo; CCU

## 2018-05-02 NOTE — CONSULT NOTE ADULT - SUBJECTIVE AND OBJECTIVE BOX
Dental asked to evaluate patient prior to LVAD surgery.  Patient saw dentist a couple of months ago. Does not see dentist regularly.    Other current complications following acute myocardial infarction  Other current complications following acute myocardial infarction  H/o or current diagnosis of HF- ACEI/ARB contraindication unknown  No pertinent family history in first degree relatives  Handoff  MEWS Score  Myocardial infarction, unspecified MI type, unspecified artery  Coronary artery disease involving native coronary artery of native heart without angina pectoris  AAA (abdominal aortic aneurysm) without rupture  Hypotension, unspecified hypotension type  Heart failure of unknown etiology  High cholesterol  Systolic CHF, acute on chronic  NSTEMI (non-ST elevated myocardial infarction)  JANENE (acute kidney injury)  Need for prophylactic measure  Hyperlipidemia, unspecified hyperlipidemia type  Chronic systolic heart failure  Coronary artery disease involving native coronary artery of native heart without angina pectoris  AICD (automatic cardioverter/defibrillator) present  OTH CURRENT COMPLICATIONS FOLL    MEDICATIONS  (STANDING):  amiodarone Infusion 0.5 mG/Min (16.667 mL/Hr) IV Continuous <Continuous>  amiodarone Infusion 1 mG/Min (33.333 mL/Hr) IV Continuous <Continuous>  aspirin  chewable 81 milliGRAM(s) Oral daily  heparin  Infusion.  Unit(s)/Hr (10 mL/Hr) IV Continuous <Continuous>  influenza   Vaccine 0.5 milliLiter(s) IntraMuscular once  milrinone Infusion 0.375 MICROgram(s)/kG/Min (9.776 mL/Hr) IV Continuous <Continuous>  potassium chloride    Tablet ER 40 milliEquivalent(s) Oral every 4 hours  potassium chloride  20 mEq/100 mL IVPB 20 milliEquivalent(s) IV Intermittent every 2 hours  ticagrelor 90 milliGRAM(s) Oral two times a day    MEDICATIONS  (PRN):  heparin  Injectable 5300 Unit(s) IV Push every 6 hours PRN For aPTT less than 40    Allergies    No Known Allergies    Intolerances      Vital Signs Last 24 Hrs  T(C): 37.2 (02 May 2018 12:00), Max: 37.5 (02 May 2018 00:00)  T(F): 99 (02 May 2018 12:00), Max: 99.5 (02 May 2018 00:00)  HR: 94 (02 May 2018 16:00) (90 - 102)  BP: --  BP(mean): --  RR: 16 (02 May 2018 16:00) (14 - 22)  SpO2: 98% (02 May 2018 16:00) (92% - 100%)  LABS:                        11.2   12.5  )-----------( 141      ( 02 May 2018 15:33 )             34.9     05-02    134<L>  |  95<L>  |  49<H>  ----------------------------<  170<H>  3.5   |  24  |  1.75<H>    Ca    7.5<L>      02 May 2018 15:33  Phos  2.4     05-02  Mg     2.6     05-02    TPro  5.6<L>  /  Alb  3.3  /  TBili  2.3<H>  /  DBili  x   /  AST  1683<H>  /  ALT  1708<H>  /  AlkPhos  93  05-02    WBC Count: 12.5 K/uL <H> [3.8 - 10.5] (05-02 @ 15:33)  Platelet Count - Automated: 141 K/uL <L> [150 - 400] (05-02 @ 15:33)  INR: 1.89 ratio <H> [0.88 - 1.16] (05-02 @ 10:04)  WBC Count: 15.9 K/uL <H> [3.8 - 10.5] (05-02 @ 06:22)  Platelet Count - Automated: 151 K/uL [150 - 400] (05-02 @ 06:22)  WBC Count: 17.7 K/uL <H> [3.8 - 10.5] (05-02 @ 03:22)  Platelet Count - Automated: 169 K/uL [150 - 400] (05-02 @ 03:22)  INR: 2.02 ratio <H> [0.88 - 1.16] (05-02 @ 00:21)  WBC Count: 18.7 K/uL <H> [3.8 - 10.5] (05-01 @ 22:40)  Platelet Count - Automated: 163 K/uL [150 - 400] (05-01 @ 22:40)      CLINICAL EXAM:   1-present, Bridge from #2-6 (2, 5, 6 are abutments, 3 + 4 missing), #7 present, #8+0 crowns,, #10-13 present, #14 amalgam restoration, 15 severe recession, #16 + 17 missing, 18 amalgam restoration, #19 missing, 20-31 present, #32 missing.    (-) palpation, (-) swelling, (-) signs of infection. Patient not in any pain, does not state having any dental needs or problems/concerns at this time.  TMJ dislocates bilaterally when opening wide, ROHIT is ~35mm. No mobility, recession on multiple dentition.    DENTAL RADIOGRAPHS: Unable to take any radiographs as patient is not transportable.    ASSESSMENT: Radiograph needed to complete assessment. Clinical exam does not reveal any acute dental needs at this time.     RECOMMENDATIONS:  1) F/U with outpatient dentist for comprehensive dental care upon discharge.  2) If swelling, fever, difficulty breathing/swallowing occurs, page Dental.     Resident Doctor: Yakelin Baez DDS, MSc       Pager # [phone]  [facility] Director: Luca Suero

## 2018-05-02 NOTE — PROGRESS NOTE ADULT - SUBJECTIVE AND OBJECTIVE BOX
CCU Daily Progress Note    Patient is a 63y old  Male who presents with a chief complaint of chest pain and SOB    Interval Events: No acute events overnight. Net negative ~2.3L overnight, lasix drip discontinued due to low CVP. Remains on milrinone. Weaned off bipap to room air.     HPI:  This is a 64 y/o M with CAD s/p MI and cardiac arrests, remote stents x 10 (2005), HFrEF s/p AICD ([employer]) with battery change 3 years ago, AAA, gastic ulcers, HLD, and BPH presented to [facility] c/o difficulty breathing and abdominal pain with wretching which they attributed to constipation.  Per patient’s spouse, he has been intermittent non-radiating chest pain x 1 month but refused to come to the ED.  Most of the info is obtained from patient’s spouse.  He has been chronically c/o dizziness and left arm pain since ICD implantation.  Patient has been off his medications (ASA, Plavix, and Lipitor) x 1 month and Entresto 8 months ago due to lack of insurance.  Denies CP, DAVIS, orthopnea, weight gain, fever, sick contacts, or recent travels.  Of note, patient has had difficulty remembering events since his cardiac arrest in 2005, according to his neurologist due to anoxia per spouse.  In OSH ED, EKG showed LBBB and elevated cardiac enzymes (MRQ=921, CK/MB=20.7, Trop I=27.5).  Transferred to [facility] for concern of STEMI.  CTA of the chest, abdomen, and pelvis done showing no evidence of aortic dissection; 3.7 cm infrarenal abdominal aortic aneurysm with partial thrombotic plaque.  No central pulmonary embolism.  Small bilateral pleural effusions.  CT head showed no intracranial hemorrhage, mass effect or large acute cortical infarct.    Upon arrival to PICU, patient was pain-free, denies difficulty breathing but c/o back pain, attributing it to the stretcher. Loaded with ASA, Plavix, started on lopressor 12.5mg 2xd. Decompensated 5/1 AM requiring Lasix 80mg IVP and BIPAP support    REVIEW OF SYSTEMS: + Shortness of breath  	    MEDICATIONS  (STANDING):  clopidogrel Tablet 75 milliGRAM(s) Oral daily  heparin  Infusion.  Unit(s)/Hr (10 mL/Hr) IV Continuous <Continuous>  influenza   Vaccine 0.5 milliLiter(s) IntraMuscular once  metoprolol tartrate 12.5 milliGRAM(s) Oral two times a day  simvastatin 80 milliGRAM(s) Oral at bedtime    MEDICATIONS  (PRN):  heparin  Injectable 5300 Unit(s) IV Push every 6 hours PRN For aPTT less than 40      PHYSICAL EXAM:  Vital Signs Last 24 Hrs  T(C): 36.6 (01 May 2018 05:20), Max: 36.7 (01 May 2018 01:12)  T(F): 97.9 (01 May 2018 05:20), Max: 98 (01 May 2018 01:12)  HR: 114 (01 May 2018 06:00) (114 - 127)  BP: 95/80 (01 May 2018 06:00) (88/67 - 122/67)  BP(mean): 86 (01 May 2018 06:00) (76 - 86)  RR: 21 (01 May 2018 06:00) (18 - 25)  SpO2: 100% (01 May 2018 06:00) (73% - 100%)    Adult Advanced Hemodynamics Last 24 Hrs  CVP(mm Hg): 2 (02 May 2018 07:00) (2 - 16)  CVP(cm H2O): --  CO: 4.1 (01 May 2018 23:00) (3 - 4.1)  CI: 2 (01 May 2018 23:00) (1.5 - 2)  PA: 33/16 (02 May 2018 07:00) (29/14 - 52/36)  PA(mean): 23 (02 May 2018 07:00) (19 - 93)  PCWP: --  SVR: 1130 (01 May 2018 23:00) (1130 - 1518)  SVRI: 2317 (01 May 2018 23:00) (2317 - 3036)  PVR: --  PVRI: --    I&O's Summary    30 Apr 2018 07:01  -  01 May 2018 07:00  --------------------------------------------------------  IN: 20 mL / OUT: 0 mL / NET: 20 mL      Appearance: tachypneic  HEENT:   Normal oral mucosa, PERRL, EOMI	  Cardiovascular: Normal S1 S2, No murmurs, No edema  Respiratory: CTAB  Psychiatry: A & O x 3, Mood & affect appropriate  Gastrointestinal:  Soft, Non-tender, + BS	  Skin: No rashes, No ecchymoses, No cyanosis	  Neurologic: Non-focal  Extremities: Normal range of motion, No clubbing, cyanosis or edema  Vascular: Peripheral pulses palpable 2+ bilaterally    LABS:	 	                        11.4   15.9  )-----------( 151      ( 02 May 2018 06:22 )             35.7       05-02    138  |  99  |  55<H>  ----------------------------<  110<H>  3.3<L>   |  23  |  2.12<H>    Ca    7.7<L>      02 May 2018 06:22  Phos  3.2     05-02  Mg     2.0     05-02    TPro  5.8<L>  /  Alb  3.3  /  TBili  2.4<H>  /  DBili  x   /  AST  3125<H>  /  ALT  2123<H>  /  AlkPhos  96  05-02      LIVER FUNCTIONS - ( 02 May 2018 03:21 )  Alb: 3.3 g/dL / Pro: 5.8 g/dL / ALK PHOS: 96 U/L / ALT: 2123 U/L / AST: 3125 U/L / GGT: x             Creatine Kinase, Serum: 510 U/L (05-02-18 @ 03:21)  Troponin T, Serum: 2.32 ng/mL (05-02-18 @ 03:21)  Creatine Kinase, Serum: 553 U/L (05-01-18 @ 22:40)  Troponin T, Serum: 2.17 ng/mL (05-01-18 @ 22:40)  Creatine Kinase, Serum: 506 U/L (05-01-18 @ 14:24)  Troponin T, Serum: 1.98 ng/mL (05-01-18 @ 14:24)  Creatine Kinase, Serum: 386 U/L (05-01-18 @ 06:35)  Troponin T, Serum: 1.16 ng/mL (05-01-18 @ 06:35)  Creatine Kinase, Serum: 408 U/L (05-01-18 @ 01:37)      PT/INR - ( 02 May 2018 00:21 )   PT: 22.1 sec;   INR: 2.02 ratio         PTT - ( 02 May 2018 03:22 )  PTT:46.1 sec    ABG - ( 01 May 2018 20:22 )  pH, Arterial: 7.45  pH, Blood: x     /  pCO2: 29    /  pO2: 88    / HCO3: 20    / Base Excess: -2.6  /  SaO2: 97            TELEMETRY: 	Sinus tachycardia    ECG:  	Sinus tach with LBBB  RADIOLOGY:		  	< from: Xray Chest 1 View- PORTABLE-Urgent (05.01.18 @ 01:38) >  FINDINGS:     There is a cardiac conduction device overlying the left axilla with   intact leads to the heart.    Lungs: The lungs are clear.  Heart: The heart is normal in size.  Mediastinum: The mediastinum is within normal limits.    IMPRESSION:    Clear lungs.    < end of copied text > CCU Daily Progress Note    Patient is a 63y old  Male who presents with a chief complaint of chest pain and SOB    Interval Events: No acute events overnight. Net negative ~2.3L overnight, lasix drip discontinued due to low CVP. Remains on milrinone. Weaned off bipap to room air.     HPI:  This is a 62 y/o M with CAD s/p MI and cardiac arrests, remote stents x 10 (2005), HFrEF s/p AICD ([employer]) with battery change 3 years ago, AAA, gastic ulcers, HLD, and BPH presented to [facility] c/o difficulty breathing and abdominal pain with wretching which they attributed to constipation.  Per patient’s spouse, he has been intermittent non-radiating chest pain x 1 month but refused to come to the ED.  Most of the info is obtained from patient’s spouse.  He has been chronically c/o dizziness and left arm pain since ICD implantation.  Patient has been off his medications (ASA, Plavix, and Lipitor) x 1 month and Entresto 8 months ago due to lack of insurance.  Denies CP, DAVIS, orthopnea, weight gain, fever, sick contacts, or recent travels.  Of note, patient has had difficulty remembering events since his cardiac arrest in 2005, according to his neurologist due to anoxia per spouse.  In OSH ED, EKG showed LBBB and elevated cardiac enzymes (TZQ=267, CK/MB=20.7, Trop I=27.5).  Transferred to [facility] for concern of STEMI.  CTA of the chest, abdomen, and pelvis done showing no evidence of aortic dissection; 3.7 cm infrarenal abdominal aortic aneurysm with partial thrombotic plaque.  No central pulmonary embolism.  Small bilateral pleural effusions.  CT head showed no intracranial hemorrhage, mass effect or large acute cortical infarct.    Upon arrival to PICU, patient was pain-free, denies difficulty breathing but c/o back pain, attributing it to the stretcher. Loaded with ASA, Plavix, started on lopressor 12.5mg 2xd. Decompensated 5/1 AM requiring Lasix 80mg IVP and BIPAP support    REVIEW OF SYSTEMS: + Shortness of breath  	  MEDICATIONS  (STANDING):  aspirin  chewable 81 milliGRAM(s) Oral daily  furosemide Infusion 10 mG/Hr (5 mL/Hr) IV Continuous <Continuous>  heparin  Infusion.  Unit(s)/Hr (10 mL/Hr) IV Continuous <Continuous>  influenza   Vaccine 0.5 milliLiter(s) IntraMuscular once  milrinone Infusion 0.375 MICROgram(s)/kG/Min (9.776 mL/Hr) IV Continuous <Continuous>  ticagrelor 90 milliGRAM(s) Oral two times a day    MEDICATIONS  (PRN):  heparin  Injectable 5300 Unit(s) IV Push every 6 hours PRN For aPTT less than 40      PHYSICAL EXAM:  T(C): 37.2 (05-02-18 @ 06:00), Max: 37.5 (05-02-18 @ 00:00)  HR: 96 (05-02-18 @ 07:00) (90 - 113)  BP: 126/66 (05-01-18 @ 12:15) (69/59 - 126/66)  BP(mean): 86 (05-01-18 @ 12:15) (64 - 86)  ABP: 86/42 (05-02-18 @ 07:00) (84/38 - 102/40)  ABP(mean): 62 (05-02-18 @ 07:00) (60 - 68)  RR: 18 (05-02-18 @ 07:00) (14 - 33)  SpO2: 95% (05-02-18 @ 07:00) (92% - 100%)  Wt(kg): --  CVP(mm Hg): 2 (05-02-18 @ 07:00) (2 - 16)  CI: 2 (05-01-18 @ 23:00) (1.5 - 2)  CAPILLARY BLOOD GLUCOSE       N/A      05-01 @ 07:01  -  05-02 @ 07:00  --------------------------------------------------------  IN:    furosemide Infusion: 40 mL    furosemide Infusion: 22.5 mL    furosemide Infusion: 15 mL    heparin  Infusion.: 119.5 mL    milrinone  Infusion: 98 mL    milrinone  Infusion: 9.9 mL    milrinone  Infusion: 26 mL    Oral Fluid: 360 mL    Solution: 200 mL  Total IN: 890.9 mL    OUT:    Indwelling Catheter - Urethral: 4555 mL  Total OUT: 4555 mL    Total NET: -3664.1 mL      Adult Advanced Hemodynamics Last 24 Hrs  CVP(mm Hg): 2 (02 May 2018 07:00) (2 - 16)  CVP(cm H2O): --  CO: 4.1 (01 May 2018 23:00) (3 - 4.1)  CI: 2 (01 May 2018 23:00) (1.5 - 2)  PA: 33/16 (02 May 2018 07:00) (29/14 - 52/36)  PA(mean): 23 (02 May 2018 07:00) (19 - 93)  PCWP: --  SVR: 1130 (01 May 2018 23:00) (1130 - 1518)  SVRI: 2317 (01 May 2018 23:00) (2317 - 3036)  PVR: --  PVRI: --    Appearance:   HEENT:   Normal oral mucosa, PERRL, EOMI	  Cardiovascular: Normal S1 S2, No murmurs, No edema  Respiratory: CTAB, comfortable on room air  Psychiatry: A & O x 3, Mood & affect appropriate  Gastrointestinal:  Soft, Non-tender, + BS	  Skin: No rashes, No ecchymoses, No cyanosis	  Neurologic: Non-focal  Extremities: Normal range of motion, No clubbing, cyanosis or edema. Right fem site clean, dry, intact, NVI distaly  Vascular: Peripheral pulses palpable 2+ bilaterally    LABS:	 	                        11.4   15.9  )-----------( 151      ( 02 May 2018 06:22 )             35.7       05-02    138  |  99  |  55<H>  ----------------------------<  110<H>  3.3<L>   |  23  |  2.12<H>    Ca    7.7<L>      02 May 2018 06:22  Phos  3.2     05-02  Mg     2.0     05-02    TPro  5.8<L>  /  Alb  3.3  /  TBili  2.4<H>  /  DBili  x   /  AST  3125<H>  /  ALT  2123<H>  /  AlkPhos  96  05-02      LIVER FUNCTIONS - ( 02 May 2018 03:21 )  Alb: 3.3 g/dL / Pro: 5.8 g/dL / ALK PHOS: 96 U/L / ALT: 2123 U/L / AST: 3125 U/L / GGT: x             Creatine Kinase, Serum: 510 U/L (05-02-18 @ 03:21)  Troponin T, Serum: 2.32 ng/mL (05-02-18 @ 03:21)  Creatine Kinase, Serum: 553 U/L (05-01-18 @ 22:40)  Troponin T, Serum: 2.17 ng/mL (05-01-18 @ 22:40)  Creatine Kinase, Serum: 506 U/L (05-01-18 @ 14:24)  Troponin T, Serum: 1.98 ng/mL (05-01-18 @ 14:24)  Creatine Kinase, Serum: 386 U/L (05-01-18 @ 06:35)  Troponin T, Serum: 1.16 ng/mL (05-01-18 @ 06:35)  Creatine Kinase, Serum: 408 U/L (05-01-18 @ 01:37)      PT/INR - ( 02 May 2018 00:21 )   PT: 22.1 sec;   INR: 2.02 ratio         PTT - ( 02 May 2018 03:22 )  PTT:46.1 sec    ABG - ( 01 May 2018 20:22 )  pH, Arterial: 7.45  pH, Blood: x     /  pCO2: 29    /  pO2: 88    / HCO3: 20    / Base Excess: -2.6  /  SaO2: 97            TELEMETRY: 	Sinus tachycardia    ECG:  	Sinus tach with LBBB  RADIOLOGY:		  	< from: Xray Chest 1 View- PORTABLE-Urgent (05.01.18 @ 01:38) >  FINDINGS:     There is a cardiac conduction device overlying the left axilla with   intact leads to the heart.    Lungs: The lungs are clear.  Heart: The heart is normal in size.  Mediastinum: The mediastinum is within normal limits.    IMPRESSION:    Clear lungs.    < end of copied text > CCU Daily Progress Note    Patient is a 63y old  Male who presents with a chief complaint of chest pain and SOB    Interval Events: No acute events overnight. Net negative ~2.3L overnight, lasix drip discontinued due to low CVP. Remains on milrinone. Weaned off bipap to room air.     HPI:  This is a 62 y/o M with CAD s/p MI and cardiac arrests, remote stents x 10 (2005), HFrEF s/p AICD ([employer]) with battery change 3 years ago, AAA, gastic ulcers, HLD, and BPH presented to [facility] c/o difficulty breathing and abdominal pain with wretching which they attributed to constipation.  Per patient’s spouse, he has been intermittent non-radiating chest pain x 1 month but refused to come to the ED.  Most of the info is obtained from patient’s spouse.  He has been chronically c/o dizziness and left arm pain since ICD implantation.  Patient has been off his medications (ASA, Plavix, and Lipitor) x 1 month and Entresto 8 months ago due to lack of insurance.  Denies CP, DAVIS, orthopnea, weight gain, fever, sick contacts, or recent travels.  Of note, patient has had difficulty remembering events since his cardiac arrest in 2005, according to his neurologist due to anoxia per spouse.  In OSH ED, EKG showed LBBB and elevated cardiac enzymes (WSU=205, CK/MB=20.7, Trop I=27.5).  Transferred to [facility] for concern of STEMI.  CTA of the chest, abdomen, and pelvis done showing no evidence of aortic dissection; 3.7 cm infrarenal abdominal aortic aneurysm with partial thrombotic plaque.  No central pulmonary embolism.  Small bilateral pleural effusions.  CT head showed no intracranial hemorrhage, mass effect or large acute cortical infarct.    Upon arrival to PICU, patient was pain-free, denies difficulty breathing but c/o back pain, attributing it to the stretcher. Loaded with ASA, Plavix, started on lopressor 12.5mg 2xd. Decompensated 5/1 AM requiring Lasix 80mg IVP and BIPAP support    REVIEW OF SYSTEMS: + Shortness of breath  	  MEDICATIONS  (STANDING):  aspirin  chewable 81 milliGRAM(s) Oral daily  furosemide Infusion 10 mG/Hr (5 mL/Hr) IV Continuous <Continuous>  heparin  Infusion.  Unit(s)/Hr (10 mL/Hr) IV Continuous <Continuous>  influenza   Vaccine 0.5 milliLiter(s) IntraMuscular once  milrinone Infusion 0.375 MICROgram(s)/kG/Min (9.776 mL/Hr) IV Continuous <Continuous>  ticagrelor 90 milliGRAM(s) Oral two times a day    MEDICATIONS  (PRN):  heparin  Injectable 5300 Unit(s) IV Push every 6 hours PRN For aPTT less than 40      PHYSICAL EXAM:  T(C): 37.2 (05-02-18 @ 06:00), Max: 37.5 (05-02-18 @ 00:00)  HR: 96 (05-02-18 @ 07:00) (90 - 113)  BP: 126/66 (05-01-18 @ 12:15) (69/59 - 126/66)  BP(mean): 86 (05-01-18 @ 12:15) (64 - 86)  ABP: 86/42 (05-02-18 @ 07:00) (84/38 - 102/40)  ABP(mean): 62 (05-02-18 @ 07:00) (60 - 68)  RR: 18 (05-02-18 @ 07:00) (14 - 33)  SpO2: 95% (05-02-18 @ 07:00) (92% - 100%)  Wt(kg): --  CVP(mm Hg): 2 (05-02-18 @ 07:00) (2 - 16)  CI: 2 (05-01-18 @ 23:00) (1.5 - 2)  CAPILLARY BLOOD GLUCOSE       N/A      05-01 @ 07:01  -  05-02 @ 07:00  --------------------------------------------------------  IN:    furosemide Infusion: 40 mL    furosemide Infusion: 22.5 mL    furosemide Infusion: 15 mL    heparin  Infusion.: 119.5 mL    milrinone  Infusion: 98 mL    milrinone  Infusion: 9.9 mL    milrinone  Infusion: 26 mL    Oral Fluid: 360 mL    Solution: 200 mL  Total IN: 890.9 mL    OUT:    Indwelling Catheter - Urethral: 4555 mL  Total OUT: 4555 mL    Total NET: -3664.1 mL      Adult Advanced Hemodynamics Last 24 Hrs  CVP(mm Hg): 2 (02 May 2018 07:00) (2 - 16)  CVP(cm H2O): --  CO: 4.1 (01 May 2018 23:00) (3 - 4.1)  CI: 2 (01 May 2018 23:00) (1.5 - 2)  PA: 33/16 (02 May 2018 07:00) (29/14 - 52/36)  PA(mean): 23 (02 May 2018 07:00) (19 - 93)  PCWP: --  SVR: 1130 (01 May 2018 23:00) (1130 - 1518)  SVRI: 2317 (01 May 2018 23:00) (2317 - 3036)  PVR: --  PVRI: --    Appearance:   HEENT:   Normal oral mucosa, PERRL, EOMI	  Cardiovascular: Normal S1 S2, No murmurs, No edema  Respiratory: CTAB, comfortable on room air  Psychiatry: A & O x 3, Mood & affect appropriate  Gastrointestinal:  Soft, Non-tender, + BS	  Skin: No rashes, No ecchymoses, No cyanosis	  Neurologic: Non-focal  Extremities: Normal range of motion, No clubbing, cyanosis or edema. Right fem site clean, dry, intact, NVI distally  Vascular: Peripheral pulses palpable 2+ bilaterally    LABS:	 	                        11.4   15.9  )-----------( 151      ( 02 May 2018 06:22 )             35.7       05-02    138  |  99  |  55<H>  ----------------------------<  110<H>  3.3<L>   |  23  |  2.12<H>    Ca    7.7<L>      02 May 2018 06:22  Phos  3.2     05-02  Mg     2.0     05-02    TPro  5.8<L>  /  Alb  3.3  /  TBili  2.4<H>  /  DBili  x   /  AST  3125<H>  /  ALT  2123<H>  /  AlkPhos  96  05-02      LIVER FUNCTIONS - ( 02 May 2018 03:21 )  Alb: 3.3 g/dL / Pro: 5.8 g/dL / ALK PHOS: 96 U/L / ALT: 2123 U/L / AST: 3125 U/L / GGT: x             Creatine Kinase, Serum: 510 U/L (05-02-18 @ 03:21)  Troponin T, Serum: 2.32 ng/mL (05-02-18 @ 03:21)  Creatine Kinase, Serum: 553 U/L (05-01-18 @ 22:40)  Troponin T, Serum: 2.17 ng/mL (05-01-18 @ 22:40)  Creatine Kinase, Serum: 506 U/L (05-01-18 @ 14:24)  Troponin T, Serum: 1.98 ng/mL (05-01-18 @ 14:24)  Creatine Kinase, Serum: 386 U/L (05-01-18 @ 06:35)  Troponin T, Serum: 1.16 ng/mL (05-01-18 @ 06:35)  Creatine Kinase, Serum: 408 U/L (05-01-18 @ 01:37)      PT/INR - ( 02 May 2018 00:21 )   PT: 22.1 sec;   INR: 2.02 ratio         PTT - ( 02 May 2018 03:22 )  PTT:46.1 sec    ABG - ( 01 May 2018 20:22 )  pH, Arterial: 7.45  pH, Blood: x     /  pCO2: 29    /  pO2: 88    / HCO3: 20    / Base Excess: -2.6  /  SaO2: 97            TELEMETRY: 	Sinus tachycardia    ECG:  	Sinus tach with LBBB  RADIOLOGY:		  	< from: Xray Chest 1 View- PORTABLE-Urgent (05.01.18 @ 01:38) >  FINDINGS:     There is a cardiac conduction device overlying the left axilla with   intact leads to the heart.    Lungs: The lungs are clear.  Heart: The heart is normal in size.  Mediastinum: The mediastinum is within normal limits.    IMPRESSION:    Clear lungs.    < end of copied text >

## 2018-05-02 NOTE — CONSULT NOTE ADULT - SUBJECTIVE AND OBJECTIVE BOX
Chief Complaint:  Patient is a 63y old  Male who presents with a chief complaint of     HPI:  63M PMHx CAD/MI/cardiac arrest/PCI x 10 (2005), HFrEF s/p AICD ([employer]) with battery change 3 years ago, AAA, gastic ulcers, HLD, and BPH a/w STEMI equivalent new LBBB found to have triple vessel disease per report not amenable to fixation pending LVAD evaluation c/b AAA and peripheral vascular disease. Would recommend further evaluation of PAD once IABP is removed. For LVAD workup, would recommend central cannulation as opposed to peripheral access for placing on pump. Pt's BLE's are well perfused.       Allergies:  No Known Allergies      Home Medications:    Hospital Medications:  aspirin  chewable 81 milliGRAM(s) Oral daily  heparin  Infusion.  Unit(s)/Hr IV Continuous <Continuous>  heparin  Injectable 5300 Unit(s) IV Push every 6 hours PRN  influenza   Vaccine 0.5 milliLiter(s) IntraMuscular once  milrinone Infusion 0.375 MICROgram(s)/kG/Min IV Continuous <Continuous>  ticagrelor 90 milliGRAM(s) Oral two times a day      PMHX/PSHX:  Myocardial infarction, unspecified MI type, unspecified artery  Coronary artery disease involving native coronary artery of native heart without angina pectoris  AAA (abdominal aortic aneurysm) without rupture  Hypotension, unspecified hypotension type  Heart failure of unknown etiology  High cholesterol  AICD (automatic cardioverter/defibrillator) present      Family history:  No pertinent family history in first degree relatives      Social History:     ROS:     General:  No wt loss, fevers, chills, night sweats, fatigue,   Eyes:  Good vision, no reported pain  ENT:  No sore throat, pain, runny nose, dysphagia  CV:  No pain, palpitations, hypo/hypertension  Resp:  No dyspnea, cough, tachypnea, wheezing  GI:  See HPI  :  No pain, bleeding, incontinence, nocturia  Muscle:  No pain, weakness  Neuro:  No weakness, tingling, memory problems  Psych:  No fatigue, insomnia, mood problems, depression  Endocrine:  No polyuria, polydipsia, cold/heat intolerance  Heme:  No petechiae, ecchymosis, easy bruisability  Skin:  No rash, edema      PHYSICAL EXAM:     GENERAL:  Appears stated age, well-groomed, well-nourished, no distress  HEENT:  NC/AT,  conjunctivae clear and pink,  no JVD  CHEST:  Full & symmetric excursion, no increased effort, breath sounds clear  HEART:  Regular rhythm, S1, S2, no murmur/rub/S3/S4, no abdominal bruit, no edema  ABDOMEN:  Soft, non-tender, non-distended, normoactive bowel sounds,  no masses ,  EXTREMITIES:  no cyanosis,clubbing or edema  SKIN:  No rash/erythema/ecchymoses/petechiae/wounds/abscess/warm/dry  NEURO:  Alert, oriented    Vital Signs:  Vital Signs Last 24 Hrs  T(C): 37.2 (02 May 2018 12:00), Max: 37.5 (02 May 2018 00:00)  T(F): 99 (02 May 2018 12:00), Max: 99.5 (02 May 2018 00:00)  HR: 96 (02 May 2018 13:00) (90 - 102)  BP: --  BP(mean): --  RR: 20 (02 May 2018 13:00) (14 - 20)  SpO2: 96% (02 May 2018 13:00) (92% - 100%)  Daily     Daily     LABS:                        11.4   15.9  )-----------( 151      ( 02 May 2018 06:22 )             35.7     05-02    138  |  99  |  55<H>  ----------------------------<  110<H>  3.3<L>   |  23  |  2.12<H>    Ca    7.7<L>      02 May 2018 06:22  Phos  3.2     05-02  Mg     2.0     05-02    TPro  5.8<L>  /  Alb  3.3  /  TBili  2.4<H>  /  DBili  x   /  AST  3125<H>  /  ALT  2123<H>  /  AlkPhos  96  05-02    LIVER FUNCTIONS - ( 02 May 2018 03:21 )  Alb: 3.3 g/dL / Pro: 5.8 g/dL / ALK PHOS: 96 U/L / ALT: 2123 U/L / AST: 3125 U/L / GGT: x           PT/INR - ( 02 May 2018 10:04 )   PT: 20.9 sec;   INR: 1.89 ratio         PTT - ( 02 May 2018 10:04 )  PTT:63.1 sec        Imaging: Chief Complaint:  Patient is a 63y old  Male who presents with a chief complaint of     HPI:  63 year old male with CAD s/p MI and cardiac arrests, PCI x 10 in 2005, HFrEF/NICM (biventricular dysfunction, EF 18%, severe MR) s/p AICD (battery change 3 years ago), AAA, gastic ulcers, HLD, and BPH, currently admitted in the CCU for cardiogenic shock.   Patient is being evaluated for an LVAD vs transplant. GI consulted for a screening colonoscopy in this setting.     Patient states that he has no nausea, vomiting, abdominal pain. At baseline, he moves his bowels every 1-2 days, sometimes has blood on wiping himself after a bowel movement, likely due to hemorrhoids. He has never had a colonoscopy and does not have a family history of colon cancer.    Allergies:  No Known Allergies      Home Medications:   Incomplete Medication History as of 01-May-2018 01:44 documented in Structured Notes  · 	Plavix 75 mg oral tablet: 1 tab(s) orally once a day  · 	simvastatin 40 mg oral tablet: 1 tab(s) orally once a day (at bedtime)  · 	metoprolol tartrate 25 mg oral tablet: 1 tab(s) orally 2 times a day    Hospital Medications:  aspirin  chewable 81 milliGRAM(s) Oral daily  heparin  Infusion.  Unit(s)/Hr IV Continuous <Continuous>  heparin  Injectable 5300 Unit(s) IV Push every 6 hours PRN  influenza   Vaccine 0.5 milliLiter(s) IntraMuscular once  milrinone Infusion 0.375 MICROgram(s)/kG/Min IV Continuous <Continuous>  ticagrelor 90 milliGRAM(s) Oral two times a day      PMHX/PSHX:  Myocardial infarction, unspecified MI type, unspecified artery  Coronary artery disease involving native coronary artery of native heart without angina pectoris  AAA (abdominal aortic aneurysm) without rupture  Hypotension, unspecified hypotension type  Heart failure of unknown etiology  High cholesterol  AICD (automatic cardioverter/defibrillator) present      Family history:  No pertinent family history in first degree relatives      Social History:     ROS:     General:  No wt loss, fevers, chills, night sweats, fatigue,   Eyes:  Good vision, no reported pain  ENT:  No sore throat, pain, runny nose, dysphagia  CV:  No pain, palpitations, hypo/hypertension  Resp:  No dyspnea, cough, tachypnea, wheezing  GI:  See HPI  :  No pain, bleeding, incontinence, nocturia  Muscle:  No pain, weakness  Neuro:  No weakness, tingling, memory problems  Psych:  No fatigue, insomnia, mood problems, depression  Endocrine:  No polyuria, polydipsia, cold/heat intolerance  Heme:  No petechiae, ecchymosis, easy bruisability  Skin:  No rash, edema      PHYSICAL EXAM:     Appearance: Normal	  HEENT:   Normal oral mucosa, PERRL, EOMI	  Lymphatic: No lymphadenopathy  Cardiovascular: Normal S1 S2, No JVD, No murmurs, No edema  Respiratory: Lungs clear to auscultation	  Psychiatry: A & O x 3, Mood & affect appropriate  Gastrointestinal:  Soft, Non-tender, + BS	  Skin: No rashes, No ecchymoses, No cyanosis	  Neurologic: Non-focal  Extremities: Normal range of motion, No clubbing, cyanosis or edema. Warm extremities   Vascular: Peripheral pulses palpable 2+ bilaterally    Vital Signs:  Vital Signs Last 24 Hrs  T(C): 37.2 (02 May 2018 12:00), Max: 37.5 (02 May 2018 00:00)  T(F): 99 (02 May 2018 12:00), Max: 99.5 (02 May 2018 00:00)  HR: 96 (02 May 2018 13:00) (90 - 102)  BP: --  BP(mean): --  RR: 20 (02 May 2018 13:00) (14 - 20)  SpO2: 96% (02 May 2018 13:00) (92% - 100%)  Daily     Daily     LABS:                        11.4   15.9  )-----------( 151      ( 02 May 2018 06:22 )             35.7     05-02    138  |  99  |  55<H>  ----------------------------<  110<H>  3.3<L>   |  23  |  2.12<H>    Ca    7.7<L>      02 May 2018 06:22  Phos  3.2     05-02  Mg     2.0     05-02    TPro  5.8<L>  /  Alb  3.3  /  TBili  2.4<H>  /  DBili  x   /  AST  3125<H>  /  ALT  2123<H>  /  AlkPhos  96  05-02    LIVER FUNCTIONS - ( 02 May 2018 03:21 )  Alb: 3.3 g/dL / Pro: 5.8 g/dL / ALK PHOS: 96 U/L / ALT: 2123 U/L / AST: 3125 U/L / GGT: x           PT/INR - ( 02 May 2018 10:04 )   PT: 20.9 sec;   INR: 1.89 ratio         PTT - ( 02 May 2018 10:04 )  PTT:63.1 sec        Imaging:

## 2018-05-02 NOTE — CONSULT NOTE ADULT - ASSESSMENT
62 yo man with a past history of MI, class IV NYHA CHF, undergoing work up for a possible VAD vs heart transplant. He also has a history of short term memory loss since his cardiac arrest ten years ago.    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 -  Varicella ab +    Will need hepatitis serologies given elevated LFTS, with ultrasound of abdomen  RPR  Quant gold  He will need vaccination for pneumonia, influenza (next season as this season is ending),      will follow with you    Mike Mcnally MD  [phone]  After 5pm/weekends [phone]

## 2018-05-02 NOTE — PROGRESS NOTE ADULT - ASSESSMENT
62 y/o M with CAD s/p MI and cardiac arrests, PCI x 10 in past, HFrEF/NICM (biventricular dysfunction, EF 18%, severe MR, LVIDd 6.6 cmc) s/p AICD presenting with cardiogenic shock. NYHA Class 4, INTERMACS 2. RA 32, RV 70/22, PA 58/44 (44), PCW 34 with Pa sat of 22, and CI of .9. LHC with diffuse disease, 3VD at sites of previous PCI. Now with lower filling pressures, with CVP of 4 this morning. Pa sat 66% with CI of 2.5.   -c/w milrinone .375 mcg/kg/min. Would attempt to reduce to .25 if patient's CI is consistently adequate, to avoid build up in setting of renal injury   -hold diuretics, would target a CVP of 6-8 given RV dysfunction with IVF if necessary   -BMP q 12 hours with repletion of K>4, Mg> 2  -c/w IABP at current settings given good augmentation.   -LVAD launch in progress, appreciate CTS recommendations-disease likely not amenable to revascularization   -primary team to obtain more collateral from patient's outpatient cardiologist   -please reach out to the HF team with any signs of deterioration as patient may need more support     Please call me at [phone] for any further questions up till 5 pm. For after hours, please call the covering cardiology on call fellow at [phone] for any further assistance.

## 2018-05-02 NOTE — CONSULT NOTE ADULT - CONSULT REQUESTED DATE/TIME
02-May-2018 16:29
01-May-2018 12:00
01-May-2018 12:46
01-May-2018 18:53
01-May-2018 19:15
02-May-2018
02-May-2018 13:13

## 2018-05-02 NOTE — PROGRESS NOTE ADULT - SUBJECTIVE AND OBJECTIVE BOX
Vascular Medicine Progress Note    Robi Payton M.D.  Pager: [phone]  Off hours and weekends call -  NS: [phone]    [id number]    Overnight events: No acute Overnight Events.     Review Of Systems: Denies SOB/DAVIS/CP/Fevers/Chills/Nausea/Vomiting/Diarrhea    Medications:  aspirin  chewable 81 milliGRAM(s) Oral daily  heparin  Infusion.  Unit(s)/Hr IV Continuous <Continuous>  heparin  Injectable 5300 Unit(s) IV Push every 6 hours PRN  influenza   Vaccine 0.5 milliLiter(s) IntraMuscular once  milrinone Infusion 0.375 MICROgram(s)/kG/Min IV Continuous <Continuous>  ticagrelor 90 milliGRAM(s) Oral two times a day    PMH/PSH/FH/SH: Unchanged  ICU Vital Signs Last 24 Hrs  T(C): 37.1 (02 May 2018 08:00), Max: 37.5 (02 May 2018 00:00)  T(F): 98.8 (02 May 2018 08:00), Max: 99.5 (02 May 2018 00:00)  HR: 96 (02 May 2018 10:00) (90 - 102)  BP: 126/66 (01 May 2018 12:15) (126/66 - 126/66)  BP(mean): 86 (01 May 2018 12:15) (86 - 86)  ABP: 88/40 (02 May 2018 10:00) (84/36 - 102/40)  ABP(mean): 62 (02 May 2018 10:00) (58 - 68)  RR: 16 (02 May 2018 10:00) (14 - 20)  SpO2: 94% (02 May 2018 10:00) (92% - 100%)  I&O's Summary    01 May 2018 07:01  -  02 May 2018 07:00  --------------------------------------------------------  IN: 890.9 mL / OUT: 4555 mL / NET: -3664.1 mL    02 May 2018 07:01  -  02 May 2018 11:02  --------------------------------------------------------  IN: 423.6 mL / OUT: 450 mL / NET: -26.4 mL        Physical Exam:  Appearance:  NAD  Eyes: PERRL, EOMI  HENT: NC/AT  Cardiovascular: S1, S2, RRR, No m/r/g appreciated, No edema, no elevation in JVP, Dopplerable BLE DP/PT. Right Femoral Artery Catherization/ABP site C/D/I, no e/o hematoma, RLE WWP/NVI.  Respiratory: Clear to auscultation bilaterally  Gastrointestinal: Soft, Non-tender, Non-distended, BS+  Musculoskeletal:  No clubbing, No joint deformity   Neurologic: Grossly Normal  Lymphatic: No lymphadenopathy  Psychiatry: AAOx3, Mood & affect appropriate  Skin: No rashes, No ecchymoses, No cyanosis    Labs:                        11.4   15.9  )-----------( 151      ( 02 May 2018 06:22 )             35.7     05-    138  |  99  |  55<H>  ----------------------------<  110<H>  3.3<L>   |  23  |  2.12<H>    Ca    7.7<L>      02 May 2018 06:22  Phos  3.2     -  Mg     2.0         TPro  5.8<L>  /  Alb  3.3  /  TBili  2.4<H>  /  DBili  x   /  AST  3125<H>  /  ALT  2123<H>  /  AlkPhos  96  -02    PTT: PTT 20.9 sec<H> [9.8 - 12.7]  PTT 22.1 sec<H> [9.8 - 12.7]    INR:INR: 1.89 ratio <H> [0.88 - 1.16] (18 @ 10:04)  INR: 2.02 ratio <H> [0.88 - 1.16] (18 @ 00:21)    CARDIAC MARKERS ( 02 May 2018 03:21 )  x     / 2.32 ng/mL / 510 U/L / x     / x      CARDIAC MARKERS ( 01 May 2018 22:40 )  x     / 2.17 ng/mL / 553 U/L / x     / 28.2 ng/mL  CARDIAC MARKERS ( 01 May 2018 14:24 )  x     / 1.98 ng/mL / 506 U/L / x     / 30.0 ng/mL  CARDIAC MARKERS ( 01 May 2018 06:35 )  x     / 1.16 ng/mL / 386 U/L / x     / 23.6 ng/mL  CARDIAC MARKERS ( 01 May 2018 01:37 )  27.500 ng/mL / x     / 408 U/L / x     / 20.7 ng/mL      Serum Pro-Brain Natriuretic Peptide: 44413 pg/mL ( @ 06:35)    Total Cholesterol: 231  LDL: 195  HDL: 21  T    Hemoglobin A1C, Whole Blood: 5.5 % ( @ 16:03)

## 2018-05-02 NOTE — CONSULT NOTE ADULT - SUBJECTIVE AND OBJECTIVE BOX
ID CONSULTATION-- Mike Mcnally [phone]    Patient is a 63y old  Male who presents with a chief complaint of   HPI:  This is a 62 y/o M with CAD s/p MI and cardiac arrests, remote stents x 10 (2005), HFrEF s/p AICD ([employer]) with battery change 3 years ago, AAA, gastic ulcers, HLD, and BPH presented to [facility] c/o difficulty breathing and abdominal pain with wretching which they attributed to constipation.  Per patient’s spouse, he has been intermittent non-radiating chest pain x 1 month but refused to come to the ED.  Most of the info is obtained from patient’s spouse.  He has been chronically c/o dizziness and left arm pain since ICD implantation.  Patient has been off his medications (ASA, Plavix, and Lipitor) x 1 month and Entresto 8 months ago due to lack of insurance.  Denies CP, DAVIS, orthopnea, weight gain, fever, sick contacts, or recent travels.  Of note, patient has had difficulty remembering events since his cardiac arrest in 2005, according to his neurologist due to anoxia per spouse.  In OSH ED, EKG showed LBBB and elevated cardiac enzymes (WMZ=272, CK/MB=20.7, Trop I=27.5).  Transferred to [facility] for concern of STEMI.  CTA of the chest, abdomen, and pelvis done showing no evidence of aortic dissection; 3.7 cm infrarenal abdominal aortic aneurysm with partial thrombotic plaque.  No central pulmonary embolism.  Small bilateral pleural effusions.  CT head showed no intracranial hemorrhage, mass effect or large acute cortical infarct.    Upon arrival to PICU, patient is pain-free, denies difficulty breathing but c/o back pain, attributing it to the stretcher. (01 May 2018 05:16)    Patient is undergoing a woprk up pre-VAd vs transplant    Patient was [place] in [place] age 17yrs.  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+  No history of hospitalization for infection  No history of significant childhood illnesses    Lives with wife and 2 dogs (up to date on shots) in [place]      PAST MEDICAL & SURGICAL HISTORY:  Myocardial infarction, unspecified MI type, unspecified artery  Coronary artery disease involving native coronary artery of native heart without angina pectoris  AAA (abdominal aortic aneurysm) without rupture  Heart failure of unknown etiology  High cholesterol  AICD (automatic cardioverter/defibrillator) present      SOCIAL:- 2 sons, lives with wife and two dogs    FAMILY HISTORY:  No pertinent family history in first degree relatives    REVIEW OF SYSTEMS  General:	Denies any malaise fatigue or chills. Fevers absent    Skin:No rash  	  Ophthalmologic:Denies any visual complaints,discharge redness or photophobia  	  ENMT:No nasal discharge,headache,sinus congestion or throat pain.No dental complaints    Respiratory and Thorax:No cough,sputum or chest pain.Denies shortness of breath  	  Cardiovascular:	No chest pain,palpitaions or dizziness    Gastrointestinal:	NO nausea,abdominal pain or diarrhea.    Genitourinary:	No dysuria,frequency. No flank pain    Musculoskeletal:	No joint swelling or pain.No weakness    Neurological:No confusion,diziness.No extremity weakness.No bladder or bowel incontinence	    Psychiatric:No delusions or hallucinations	    Hematology/Lymphatics:	No LN swelling.No gum bleeding     Endocrine:	No recent weight gain or loss.No abnormal heat/cold intolerance    Allergic/Immunologic:	No hives or rash   Allergies- no antimicrobial allergies    No Known Allergies    Intolerances        ANTIMICROBIALS:        Vital Signs Last 24 Hrs  T(C): 37.2 (02 May 2018 12:00), Max: 37.5 (02 May 2018 00:00)  T(F): 99 (02 May 2018 12:00), Max: 99.5 (02 May 2018 00:00)  HR: 98 (02 May 2018 16:30) (90 - 102)  BP: --  BP(mean): --  RR: 15 (02 May 2018 16:30) (14 - 22)  SpO2: 99% (02 May 2018 16:30) (92% - 100%)    PHYSICAL EXAM:Pleasant patient in no acute distress.      Constitutional:Comfortable.Awake and alert  No cachexia     Eyes:PERRL EOMI.NO discharge or conjunctival injection    ENMT:No sinus tenderness.No thrush.No pharyngeal exudate or erythema.Fair dental hygiene    Neck:Supple,No LN,no JVD      Respiratory:Good air entry bilaterally,CTA    Cardiovascular:S1 S2 wnl, No murmurs,rub or gallops, ICD ,     Gastrointestinal:Soft BS(+) no tenderness no masses ,No rebound or guarding    Genitourinary:No CVA tendereness     Rectal:    Extremities:No cyanosis,clubbing or edema.    Vascular:peripheral pulses felt    Neurological:AAO X 3,No grossly focal deficits    Skin:No rash     Lymph Nodes:No palpable LNs    Musculoskeletal:No joint swelling or LOM    Psychiatric:Affect normal.                              11.2   12.5  )-----------( 141      ( 02 May 2018 15:33 )             34.9       05-02    134<L>  |  95<L>  |  49<H>  ----------------------------<  170<H>  3.5   |  24  |  1.75<H>    Ca    7.5<L>      02 May 2018 15:33  Phos  2.4     05-02  Mg     2.6     05-02    TPro  5.6<L>  /  Alb  3.3  /  TBili  2.3<H>  /  DBili  x   /  AST  1683<H>  /  ALT  1708<H>  /  AlkPhos  93  05-02      RECENT CULTURES:    HIV-1/2 Antigen/Antibody Screen by [facility] (05.02.18 @ 02:09)    HIV-1/2 Combo Result: Nonreact: The HIV Ag/Ab Combo test performed screens for HIV-1 p24 antigen,  antibodies to HIV-1 (group M and group O), and antibodies to HIV-2. All  specimens repeatedly reactive will reflex to an HIV 1/2 antibody  confirmation and differentiation test. This assay detects p24 antigen  which may be present prior to the development of HIV antibodies,  therefore a reactive result with a negative HIV 1/2 AB Confirmation  should be followed up with HIV-1 RNA, HIV-2 RNA and repeat testing in 4-8  weeks. A nonreactive result does not preclude previous exposure to or  infection with HIV-1 or HIV-2. [facility] prohibits disclosure of this  result to any unauthorized party.      RADIOLOGY:  < from: Xray Chest 1 View- PORTABLE-Urgent (05.01.18 @ 17:48) >  Impression: The [place] catheter has been repositioned into the right   main pulmonary artery.    < end of copied text >      IMPRESSION:    Rx:

## 2018-05-02 NOTE — CONSULT NOTE ADULT - ATTENDING COMMENTS
62 yo male with above cardiac history being evaluated for LVAD vs. Cardiac transplant. s/p cardiac shock today, currently not stable for   screening colonoscopy.  consider CT colonography as alternative to colonoscopy for screening.

## 2018-05-02 NOTE — PROGRESS NOTE ADULT - ASSESSMENT
63M PMHx CAD/MI/cardiac arrest/PCI x 10 (2005), HFrEF s/p AICD ([employer]) with battery change 3 years ago, AAA, gastic ulcers, HLD, and BPH a/w STEMI equivalent new LBBB found to have triple vessel disease per report not amenable to fixation pending LVAD evaluation c/b AAA and peripheral vascular disease. Would recommend further evaluation of PAD once IABP is removed. For LVAD workup, would recommend central cannulation as opposed to peripheral access for placing on pump. Pt's BLE's are well perfused.       - obtain JOE/PVR once IABP removed   - obtain BL LE vascular arterial U/S obtain once IABP removed  - Serial RLE neurovascular exam while IABP in place 63M PMHx CAD/MI/cardiac arrest/PCI x 10 (2005), HFrEF s/p AICD ([employer]) with battery change 3 years ago, AAA, gastic ulcers, HLD, and BPH a/w STEMI equivalent new LBBB found to have triple vessel disease per report not amenable to fixation pending LVAD evaluation c/b AAA and peripheral vascular disease. Would recommend further evaluation of PAD once IABP is removed. For LVAD workup, would recommend central cannulation as opposed to peripheral access for placing on pump. Pt's BLE's are well perfused.       - obtain BLE UE and BL LE arterial duplex  - Serial RLE neurovascular exam while IABP in place 63M PMHx CAD/MI/cardiac arrest/PCI x 10 (2005), HFrEF s/p AICD ([employer]) with battery change 3 years ago, AAA, gastic ulcers, HLD, and BPH a/w STEMI equivalent new LBBB found to have triple vessel disease per report not amenable to fixation pending LVAD evaluation c/b AAA and peripheral vascular disease. Would recommend further evaluation of PAD once IABP is removed. For LVAD workup, would recommend central cannulation as opposed to peripheral access for placing on pump. Pt's BLE's are well perfused.       - obtain BLE UE and BL LE arterial duplex  - Serial RLE neurovascular exam while IABP in place      Attending     I reviewed peripheral vascular imaging for Mr. Franklin with our team.  His inflow arteries (external iliac arteries) are of small caliber and more advanced therapies such as Impella via groin access may create an occlusive situation and potential for a threatened limb.  The Left subclavian appears patent, and should allow for a Left axially impella if needed.    In regards to his Abdominal aortic aneurysm - would continue annual surveillance.    If more advanced mechanical support is needed, the Left axillary artery will be preferred.     Thanks    Sky Hudson  Vascular Medicine   [phone]    I discussed this with Dr. Retana.

## 2018-05-02 NOTE — PROGRESS NOTE ADULT - SUBJECTIVE AND OBJECTIVE BOX
24H hour events: No acute events overnight. Patient diuresing well.     MEDICATIONS:  aspirin  chewable 81 milliGRAM(s) Oral daily  heparin  Infusion.  Unit(s)/Hr IV Continuous <Continuous>  heparin  Injectable 5300 Unit(s) IV Push every 6 hours PRN  milrinone Infusion 0.375 MICROgram(s)/kG/Min IV Continuous <Continuous>  ticagrelor 90 milliGRAM(s) Oral two times a day  influenza   Vaccine 0.5 milliLiter(s) IntraMuscular once  potassium chloride    Tablet ER 40 milliEquivalent(s) Oral every 4 hours    REVIEW OF SYSTEMS:  Complete 10point ROS negative except as documented above.    PHYSICAL EXAM:  T(C): 37.2 (05-02-18 @ 12:00), Max: 37.5 (05-02-18 @ 00:00)  HR: 96 (05-02-18 @ 13:00) (90 - 102)  BP: --  RR: 20 (05-02-18 @ 13:00) (14 - 20)  SpO2: 96% (05-02-18 @ 13:00) (92% - 100%)  Wt(kg): --  I&O's Summary    01 May 2018 07:01  -  02 May 2018 07:00  --------------------------------------------------------  IN: 890.9 mL / OUT: 4555 mL / NET: -3664.1 mL    02 May 2018 07:01  -  02 May 2018 14:05  --------------------------------------------------------  IN: 1065.6 mL / OUT: 935 mL / NET: 130.6 mL    Appearance: Normal	  HEENT:   Normal oral mucosa, PERRL, EOMI	  Lymphatic: No lymphadenopathy  Cardiovascular: Normal S1 S2, No JVD, No murmurs, No edema  Respiratory: Lungs clear to auscultation	  Psychiatry: A & O x 3, Mood & affect appropriate  Gastrointestinal:  Soft, Non-tender, + BS	  Skin: No rashes, No ecchymoses, No cyanosis	  Neurologic: Non-focal  Extremities: Normal range of motion, No clubbing, cyanosis or edema. Warm extremities   Vascular: Peripheral pulses palpable 2+ bilaterally    LABS:	 	    CBC Full  -  ( 02 May 2018 06:22 )  WBC Count : 15.9 K/uL  Hemoglobin : 11.4 g/dL  Hematocrit : 35.7 %  Platelet Count - Automated : 151 K/uL  Mean Cell Volume : 64.7 fl  Mean Cell Hemoglobin : 20.7 pg  Mean Cell Hemoglobin Concentration : 32.1 gm/dL  Auto Neutrophil # : x  Auto Lymphocyte # : x  Auto Monocyte # : x  Auto Eosinophil # : x  Auto Basophil # : x  Auto Neutrophil % : x  Auto Lymphocyte % : x  Auto Monocyte % : x  Auto Eosinophil % : x  Auto Basophil % : x    05-02    138  |  97  |  50<H>  ----------------------------<  128<H>  3.1<L>   |  25  |  1.81<H>  05-02    138  |  99  |  55<H>  ----------------------------<  110<H>  3.3<L>   |  23  |  2.12<H>    Ca    7.6<L>      02 May 2018 13:11  Ca    7.7<L>      02 May 2018 06:22  Phos  3.2     05-02  Phos  3.3     05-01  Mg     2.0     05-02  Mg     2.0     05-01    TPro  5.7<L>  /  Alb  3.4  /  TBili  2.5<H>  /  DBili  x   /  AST  1884<H>  /  ALT  1823<H>  /  AlkPhos  93  05-02  TPro  5.8<L>  /  Alb  3.3  /  TBili  2.4<H>  /  DBili  x   /  AST  3125<H>  /  ALT  2123<H>  /  AlkPhos  96  05-02    proBNP: Serum Pro-Brain Natriuretic Peptide: 19017 pg/mL (05-01 @ 06:35)    CARDIAC MARKERS:  Troponin I, Serum: 27.500 ng/mL (05-01 @ 01:37)    TELEMETRY: NSVT 4 beats overnight x 2	      PREVIOUS DIAGNOSTIC TESTING:    [ ] Echocardiogram:  [ ]  Catheterization:  [ ] Stress Test:  	  	  ASSESSMENT/PLAN:

## 2018-05-02 NOTE — PROGRESS NOTE ADULT - ATTENDING COMMENTS
Briefly, 62 y/o M with CAD s/p MI and cardiac arrests, ? anoxic brain injury with poor memory recall, PCI x 10 in past, HFrEF/NICM (biventricular dysfunction, EF 18%, severe MR, LVEDD 6.4 cm) s/p dual chamber AICD ([employer]), HLD who presented to [facility] c/o difficulty breathing and abdominal pain with wretching, ongoing for a week or so; recently ran out of insurance and had stopped taking medications for 1 month. Was admitted on 5/1 where he was volume overloaded given lasix as well as a dose of metoprolol 12.5 mg x1; went for RHC/LHC which showed significant CAD and elevated filling pressures, low CI (0.9) so IABP placed. Noted to have PAD as well. Started on lasix gtt and milrinone 0.125 which increased to 0.375 with CI improved to approx 2.3. Diuresed well on lasix gtt with normal CVP so diuretics held. Became hypokalemic with ectopy then had polymorphic VT likely 2/2 prolonged QTc with ectopy (R on T) requiring shocks/amio gtt. Currently feels well; denies CP/SOB. On exam, JVD approx 10 cm, RRR, S3 present, clear lungs anteriorly, cool on exam; IABP in place on R c/d/i. Labs reviewed WBC 15, BUN/Cr 55/2.12 (improving; unknown baseline); AST/ALT 2k/3k (downtrending; previously 300s), lactate nl (improved from 10), BNP 21k, troponin T 2.3 (uptrending). ABG 7.39/28/101/17. EKG with LBBB (unknown if old). CTA done on admission which was negative for PE but showed 3.7 cm infrarenal AAA. TTE reviewed LVEDD 6.5 cm, EF 17%, severe MR, mod RV dysfunction, RVSP 42. Overall stage D HF, NYHA class IV, INTERMACS 2 with volume overload. Patient is blood type A.   - would bolus bumex prn for goal CVP 6-8  - continue milrinone at 0.375; titrate according to mixed sats  - check labs frequently; keep K>4, Mg>2  - continue amio to suppress ectopy  - acute kidney injury improving as is LFTs  - LVAD/transplant eval launched

## 2018-05-02 NOTE — PROGRESS NOTE ADULT - ATTENDING COMMENTS
Patient is seen and examined with fellow, NP and the CCU house-staff. I agree with the history, physical and the assessment and plan.  cardiogenic shock - on IABP with ionotropic support  maintain CVP ~5  will hold lasix for now and recheck hemodynamics at noon  monitor I/O  f/u with CHF and LVAD team

## 2018-05-02 NOTE — CONSULT NOTE ADULT - ASSESSMENT
63 year old male with CAD s/p MI and cardiac arrests, PCI x 10 in 2005, HFrEF/NICM (biventricular dysfunction, EF 18%, severe MR) s/p AICD (battery change 3 years ago), AAA, gastic ulcers, HLD, and BPH, currently admitted in the CCU for cardiogenic shock. Patient is being evaluated for an LVAD vs transplant. GI consulted for a screening colonoscopy in this setting.     Impression:   - Screening colonoscopy, prior to LVAD vs heart transplant     Plan:  - trend CBC  - recommend CT colonography (done by radiology) given patient is high risk for anesthesia  - for diet: prior to CT, will need to be on clear liquid diet for one day and then NPO after midnight  - for prep: moviprep one day prior to CT, 1L at 5:00pm, another 1L at 7:00pm  - supportive care as per primary team    Please call us back if you have any questions 63 year old male with CAD s/p MI and cardiac arrests, PCI x 10 in 2005, HFrEF/NICM (biventricular dysfunction, EF 18%, severe MR) s/p AICD (battery change 3 years ago), AAA, gastic ulcers, HLD, and BPH, currently admitted in the CCU for cardiogenic shock. Patient is being evaluated for an LVAD vs transplant. GI consulted for a screening colonoscopy in this setting.     Impression:   - Screening colonoscopy, prior to LVAD vs heart transplant     Plan:  - trend CBC  - recommend CT colonography (done by radiology) given patient is high risk for anesthesia  - for diet: prior to CT, will need to be on clear liquid diet for one day and then NPO after midnight  - for prep: moviprep one day prior to CT, 1L at 5:00pm, another 1L at 7:00pm  - supportive care as per primary team    Please call us back if you have any questions.  Thank you for the consult.

## 2018-05-02 NOTE — CONSULT NOTE ADULT - CONSULT REASON
Heart tx eval
PAD, AAA pending LVAD.
Psychological assessment as part of LVAD/HT evaluation.
heart failure
multivessel CAD/ EF 17%
pre-VAD/pre-heart transplant
screening colonoscopy before LVAD and transplant

## 2018-05-03 LAB
ALBUMIN SERPL ELPH-MCNC: 3.3 G/DL — SIGNIFICANT CHANGE UP (ref 3.3–5)
ALBUMIN SERPL ELPH-MCNC: 3.4 G/DL — SIGNIFICANT CHANGE UP (ref 3.3–5)
ALBUMIN SERPL ELPH-MCNC: 3.5 G/DL — SIGNIFICANT CHANGE UP (ref 3.3–5)
ALBUMIN SERPL ELPH-MCNC: 3.6 G/DL — SIGNIFICANT CHANGE UP (ref 3.3–5)
ALP SERPL-CCNC: 97 U/L — SIGNIFICANT CHANGE UP (ref 40–120)
ALP SERPL-CCNC: 98 U/L — SIGNIFICANT CHANGE UP (ref 40–120)
ALP SERPL-CCNC: 99 U/L — SIGNIFICANT CHANGE UP (ref 40–120)
ALP SERPL-CCNC: 99 U/L — SIGNIFICANT CHANGE UP (ref 40–120)
ALT FLD-CCNC: 1308 U/L — HIGH (ref 10–45)
ALT FLD-CCNC: 1362 U/L — HIGH (ref 10–45)
ALT FLD-CCNC: 1679 U/L — HIGH (ref 10–45)
ALT FLD-CCNC: 1786 U/L — HIGH (ref 10–45)
ANION GAP SERPL CALC-SCNC: 10 MMOL/L — SIGNIFICANT CHANGE UP (ref 5–17)
ANION GAP SERPL CALC-SCNC: 11 MMOL/L — SIGNIFICANT CHANGE UP (ref 5–17)
ANION GAP SERPL CALC-SCNC: 14 MMOL/L — SIGNIFICANT CHANGE UP (ref 5–17)
ANION GAP SERPL CALC-SCNC: 16 MMOL/L — SIGNIFICANT CHANGE UP (ref 5–17)
ANION GAP SERPL CALC-SCNC: 9 MMOL/L — SIGNIFICANT CHANGE UP (ref 5–17)
APTT BLD: 67.2 SEC — HIGH (ref 27.5–37.4)
AST SERPL-CCNC: 1050 U/L — HIGH (ref 10–40)
AST SERPL-CCNC: 434 U/L — HIGH (ref 10–40)
AST SERPL-CCNC: 534 U/L — HIGH (ref 10–40)
AST SERPL-CCNC: 745 U/L — HIGH (ref 10–40)
BASE EXCESS BLDA CALC-SCNC: 1.5 MMOL/L — SIGNIFICANT CHANGE UP (ref -2–2)
BASE EXCESS BLDA CALC-SCNC: 2.4 MMOL/L — HIGH (ref -2–2)
BASE EXCESS BLDMV CALC-SCNC: 1.9 MMOL/L — SIGNIFICANT CHANGE UP (ref -3–3)
BASE EXCESS BLDMV CALC-SCNC: 2.1 MMOL/L — SIGNIFICANT CHANGE UP (ref -3–3)
BASE EXCESS BLDMV CALC-SCNC: 2.1 MMOL/L — SIGNIFICANT CHANGE UP (ref -3–3)
BASE EXCESS BLDMV CALC-SCNC: 2.5 MMOL/L — SIGNIFICANT CHANGE UP (ref -3–3)
BASE EXCESS BLDMV CALC-SCNC: 2.8 MMOL/L — SIGNIFICANT CHANGE UP (ref -3–3)
BASE EXCESS BLDMV CALC-SCNC: 2.8 MMOL/L — SIGNIFICANT CHANGE UP (ref -3–3)
BASE EXCESS BLDMV CALC-SCNC: 3 MMOL/L — SIGNIFICANT CHANGE UP (ref -3–3)
BILIRUB SERPL-MCNC: 2.3 MG/DL — HIGH (ref 0.2–1.2)
BILIRUB SERPL-MCNC: 2.3 MG/DL — HIGH (ref 0.2–1.2)
BILIRUB SERPL-MCNC: 2.5 MG/DL — HIGH (ref 0.2–1.2)
BILIRUB SERPL-MCNC: 2.5 MG/DL — HIGH (ref 0.2–1.2)
BUN SERPL-MCNC: 20 MG/DL — SIGNIFICANT CHANGE UP (ref 7–23)
BUN SERPL-MCNC: 21 MG/DL — SIGNIFICANT CHANGE UP (ref 7–23)
BUN SERPL-MCNC: 26 MG/DL — HIGH (ref 7–23)
BUN SERPL-MCNC: 32 MG/DL — HIGH (ref 7–23)
BUN SERPL-MCNC: 38 MG/DL — HIGH (ref 7–23)
CALCIUM SERPL-MCNC: 7.5 MG/DL — LOW (ref 8.4–10.5)
CALCIUM SERPL-MCNC: 7.7 MG/DL — LOW (ref 8.4–10.5)
CALCIUM SERPL-MCNC: 7.7 MG/DL — LOW (ref 8.4–10.5)
CALCIUM SERPL-MCNC: 7.8 MG/DL — LOW (ref 8.4–10.5)
CALCIUM SERPL-MCNC: 7.9 MG/DL — LOW (ref 8.4–10.5)
CHLORIDE SERPL-SCNC: 96 MMOL/L — SIGNIFICANT CHANGE UP (ref 96–108)
CHLORIDE SERPL-SCNC: 96 MMOL/L — SIGNIFICANT CHANGE UP (ref 96–108)
CHLORIDE SERPL-SCNC: 97 MMOL/L — SIGNIFICANT CHANGE UP (ref 96–108)
CK MB BLD-MCNC: 1.8 % — SIGNIFICANT CHANGE UP (ref 0–3.5)
CK MB CFR SERPL CALC: 5.5 NG/ML — SIGNIFICANT CHANGE UP (ref 0–6.7)
CK SERPL-CCNC: 311 U/L — HIGH (ref 30–200)
CO2 BLDA-SCNC: 24 MMOL/L — SIGNIFICANT CHANGE UP (ref 22–30)
CO2 BLDA-SCNC: 25 MMOL/L — SIGNIFICANT CHANGE UP (ref 22–30)
CO2 BLDMV-SCNC: 26 MMOL/L — SIGNIFICANT CHANGE UP (ref 21–29)
CO2 BLDMV-SCNC: 27 MMOL/L — SIGNIFICANT CHANGE UP (ref 21–29)
CO2 SERPL-SCNC: 20 MMOL/L — LOW (ref 22–31)
CO2 SERPL-SCNC: 24 MMOL/L — SIGNIFICANT CHANGE UP (ref 22–31)
CO2 SERPL-SCNC: 25 MMOL/L — SIGNIFICANT CHANGE UP (ref 22–31)
CREAT ?TM UR-MCNC: 22 MG/DL — SIGNIFICANT CHANGE UP
CREAT SERPL-MCNC: 1.08 MG/DL — SIGNIFICANT CHANGE UP (ref 0.5–1.3)
CREAT SERPL-MCNC: 1.15 MG/DL — SIGNIFICANT CHANGE UP (ref 0.5–1.3)
CREAT SERPL-MCNC: 1.17 MG/DL — SIGNIFICANT CHANGE UP (ref 0.5–1.3)
CREAT SERPL-MCNC: 1.34 MG/DL — HIGH (ref 0.5–1.3)
CREAT SERPL-MCNC: 1.46 MG/DL — HIGH (ref 0.5–1.3)
FERRITIN SERPL-MCNC: 114 NG/ML — SIGNIFICANT CHANGE UP (ref 30–400)
GAS PNL BLDA: SIGNIFICANT CHANGE UP
GAS PNL BLDA: SIGNIFICANT CHANGE UP
GAS PNL BLDMV: SIGNIFICANT CHANGE UP
GLUCOSE SERPL-MCNC: 127 MG/DL — HIGH (ref 70–99)
GLUCOSE SERPL-MCNC: 129 MG/DL — HIGH (ref 70–99)
GLUCOSE SERPL-MCNC: 130 MG/DL — HIGH (ref 70–99)
GLUCOSE SERPL-MCNC: 131 MG/DL — HIGH (ref 70–99)
GLUCOSE SERPL-MCNC: 131 MG/DL — HIGH (ref 70–99)
HBV DNA # SERPL NAA+PROBE: SIGNIFICANT CHANGE UP
HBV DNA SERPL NAA+PROBE-LOG#: SIGNIFICANT CHANGE UP LOGIU/ML
HCO3 BLDA-SCNC: 23 MMOL/L — SIGNIFICANT CHANGE UP (ref 21–29)
HCO3 BLDA-SCNC: 24 MMOL/L — SIGNIFICANT CHANGE UP (ref 21–29)
HCO3 BLDMV-SCNC: 25 MMOL/L — SIGNIFICANT CHANGE UP (ref 20–28)
HCO3 BLDMV-SCNC: 26 MMOL/L — SIGNIFICANT CHANGE UP (ref 20–28)
HCT VFR BLD CALC: 37.6 % — LOW (ref 39–50)
HCT VFR BLD CALC: 37.7 % — LOW (ref 39–50)
HCT VFR BLD CALC: 38 % — LOW (ref 39–50)
HCT VFR BLD CALC: 38.5 % — LOW (ref 39–50)
HCV RNA FLD QL NAA+PROBE: SIGNIFICANT CHANGE UP
HCV RNA SPEC QL PROBE+SIG AMP: SIGNIFICANT CHANGE UP
HGB BLD-MCNC: 11.8 G/DL — LOW (ref 13–17)
HGB BLD-MCNC: 11.9 G/DL — LOW (ref 13–17)
HGB BLD-MCNC: 12 G/DL — LOW (ref 13–17)
HGB BLD-MCNC: 12.1 G/DL — LOW (ref 13–17)
HOROWITZ INDEX BLDA+IHG-RTO: 21 — SIGNIFICANT CHANGE UP
HOROWITZ INDEX BLDA+IHG-RTO: 21 — SIGNIFICANT CHANGE UP
HOROWITZ INDEX BLDMV+IHG-RTO: 21 — SIGNIFICANT CHANGE UP
INR BLD: 1.46 RATIO — HIGH (ref 0.88–1.16)
LACTATE SERPL-SCNC: 1.3 MMOL/L — SIGNIFICANT CHANGE UP (ref 0.7–2)
LACTATE SERPL-SCNC: 1.3 MMOL/L — SIGNIFICANT CHANGE UP (ref 0.7–2)
LACTATE SERPL-SCNC: 1.4 MMOL/L — SIGNIFICANT CHANGE UP (ref 0.7–2)
MAGNESIUM SERPL-MCNC: 1.9 MG/DL — SIGNIFICANT CHANGE UP (ref 1.6–2.6)
MAGNESIUM SERPL-MCNC: 2.2 MG/DL — SIGNIFICANT CHANGE UP (ref 1.6–2.6)
MAGNESIUM SERPL-MCNC: 2.2 MG/DL — SIGNIFICANT CHANGE UP (ref 1.6–2.6)
MCHC RBC-ENTMCNC: 20.2 PG — LOW (ref 27–34)
MCHC RBC-ENTMCNC: 20.4 PG — LOW (ref 27–34)
MCHC RBC-ENTMCNC: 20.6 PG — LOW (ref 27–34)
MCHC RBC-ENTMCNC: 20.9 PG — LOW (ref 27–34)
MCHC RBC-ENTMCNC: 31 GM/DL — LOW (ref 32–36)
MCHC RBC-ENTMCNC: 31.3 GM/DL — LOW (ref 32–36)
MCHC RBC-ENTMCNC: 31.5 GM/DL — LOW (ref 32–36)
MCHC RBC-ENTMCNC: 32 GM/DL — SIGNIFICANT CHANGE UP (ref 32–36)
MCV RBC AUTO: 65.2 FL — LOW (ref 80–100)
MCV RBC AUTO: 65.2 FL — LOW (ref 80–100)
MCV RBC AUTO: 65.3 FL — LOW (ref 80–100)
MCV RBC AUTO: 65.3 FL — LOW (ref 80–100)
O2 CT VFR BLD CALC: 32 MMHG — SIGNIFICANT CHANGE UP (ref 30–65)
O2 CT VFR BLD CALC: 32 MMHG — SIGNIFICANT CHANGE UP (ref 30–65)
O2 CT VFR BLD CALC: 33 MMHG — SIGNIFICANT CHANGE UP (ref 30–65)
O2 CT VFR BLD CALC: 34 MMHG — SIGNIFICANT CHANGE UP (ref 30–65)
O2 CT VFR BLD CALC: 35 MMHG — SIGNIFICANT CHANGE UP (ref 30–65)
PA ADP PRP-ACNC: 93 PRU — LOW (ref 194–417)
PCO2 BLDA: 29 MMHG — LOW (ref 32–46)
PCO2 BLDA: 31 MMHG — LOW (ref 32–46)
PCO2 BLDMV: 35 MMHG — SIGNIFICANT CHANGE UP (ref 30–65)
PCO2 BLDMV: 35 MMHG — SIGNIFICANT CHANGE UP (ref 30–65)
PCO2 BLDMV: 36 MMHG — SIGNIFICANT CHANGE UP (ref 30–65)
PCO2 BLDMV: 37 MMHG — SIGNIFICANT CHANGE UP (ref 30–65)
PH BLDA: 7.51 — HIGH (ref 7.35–7.45)
PH BLDA: 7.52 — HIGH (ref 7.35–7.45)
PH BLDMV: 7.46 — HIGH (ref 7.32–7.45)
PH BLDMV: 7.47 — HIGH (ref 7.32–7.45)
PH BLDMV: 7.47 — HIGH (ref 7.32–7.45)
PH BLDMV: 7.48 — HIGH (ref 7.32–7.45)
PHOSPHATE SERPL-MCNC: 1.8 MG/DL — LOW (ref 2.5–4.5)
PHOSPHATE SERPL-MCNC: 1.8 MG/DL — LOW (ref 2.5–4.5)
PHOSPHATE SERPL-MCNC: 1.9 MG/DL — LOW (ref 2.5–4.5)
PLATELET # BLD AUTO: 125 K/UL — LOW (ref 150–400)
PLATELET # BLD AUTO: 127 K/UL — LOW (ref 150–400)
PLATELET # BLD AUTO: 136 K/UL — LOW (ref 150–400)
PLATELET # BLD AUTO: 140 K/UL — LOW (ref 150–400)
PO2 BLDA: 68 MMHG — LOW (ref 74–108)
PO2 BLDA: 85 MMHG — SIGNIFICANT CHANGE UP (ref 74–108)
POTASSIUM SERPL-MCNC: 3.8 MMOL/L — SIGNIFICANT CHANGE UP (ref 3.5–5.3)
POTASSIUM SERPL-MCNC: 3.9 MMOL/L — SIGNIFICANT CHANGE UP (ref 3.5–5.3)
POTASSIUM SERPL-MCNC: 3.9 MMOL/L — SIGNIFICANT CHANGE UP (ref 3.5–5.3)
POTASSIUM SERPL-MCNC: 4.1 MMOL/L — SIGNIFICANT CHANGE UP (ref 3.5–5.3)
POTASSIUM SERPL-MCNC: 4.4 MMOL/L — SIGNIFICANT CHANGE UP (ref 3.5–5.3)
POTASSIUM SERPL-SCNC: 3.8 MMOL/L — SIGNIFICANT CHANGE UP (ref 3.5–5.3)
POTASSIUM SERPL-SCNC: 3.9 MMOL/L — SIGNIFICANT CHANGE UP (ref 3.5–5.3)
POTASSIUM SERPL-SCNC: 3.9 MMOL/L — SIGNIFICANT CHANGE UP (ref 3.5–5.3)
POTASSIUM SERPL-SCNC: 4.1 MMOL/L — SIGNIFICANT CHANGE UP (ref 3.5–5.3)
POTASSIUM SERPL-SCNC: 4.4 MMOL/L — SIGNIFICANT CHANGE UP (ref 3.5–5.3)
PREALB SERPL-MCNC: 11 MG/DL — LOW (ref 18–45)
PROT ?TM UR-MCNC: <4 MG/DL — SIGNIFICANT CHANGE UP (ref 0–12)
PROT ?TM UR-MCNC: <4 MG/DL — SIGNIFICANT CHANGE UP (ref 0–12)
PROT SERPL-MCNC: 5.8 G/DL — LOW (ref 6–8.3)
PROT SERPL-MCNC: 6.1 G/DL — SIGNIFICANT CHANGE UP (ref 6–8.3)
PROT SERPL-MCNC: 6.2 G/DL — SIGNIFICANT CHANGE UP (ref 6–8.3)
PROT SERPL-MCNC: 6.3 G/DL — SIGNIFICANT CHANGE UP (ref 6–8.3)
PROT/CREAT UR-RTO: SIGNIFICANT CHANGE UP (ref 0–0.2)
PROTHROM AB SERPL-ACNC: 16 SEC — HIGH (ref 9.8–12.7)
RBC # BLD: 5.76 M/UL — SIGNIFICANT CHANGE UP (ref 4.2–5.8)
RBC # BLD: 5.79 M/UL — SIGNIFICANT CHANGE UP (ref 4.2–5.8)
RBC # BLD: 5.82 M/UL — HIGH (ref 4.2–5.8)
RBC # BLD: 5.91 M/UL — HIGH (ref 4.2–5.8)
RBC # FLD: 14.8 % — HIGH (ref 10.3–14.5)
RBC # FLD: 14.8 % — HIGH (ref 10.3–14.5)
RBC # FLD: 14.9 % — HIGH (ref 10.3–14.5)
RBC # FLD: 15.2 % — HIGH (ref 10.3–14.5)
SAO2 % BLDA: 96 % — SIGNIFICANT CHANGE UP (ref 92–96)
SAO2 % BLDA: 98 % — HIGH (ref 92–96)
SAO2 % BLDMV: 57 % — LOW (ref 60–90)
SAO2 % BLDMV: 58 % — LOW (ref 60–90)
SAO2 % BLDMV: 58 % — LOW (ref 60–90)
SAO2 % BLDMV: 61 % — SIGNIFICANT CHANGE UP (ref 60–90)
SAO2 % BLDMV: 61 % — SIGNIFICANT CHANGE UP (ref 60–90)
SAO2 % BLDMV: 66 % — SIGNIFICANT CHANGE UP (ref 60–90)
SAO2 % BLDMV: 68 % — SIGNIFICANT CHANGE UP (ref 60–90)
SODIUM SERPL-SCNC: 130 MMOL/L — LOW (ref 135–145)
SODIUM SERPL-SCNC: 131 MMOL/L — LOW (ref 135–145)
SODIUM SERPL-SCNC: 132 MMOL/L — LOW (ref 135–145)
SODIUM SERPL-SCNC: 133 MMOL/L — LOW (ref 135–145)
SODIUM SERPL-SCNC: 134 MMOL/L — LOW (ref 135–145)
T GONDII IGG SER QL: <3 IU/ML — SIGNIFICANT CHANGE UP
T GONDII IGG SER QL: NEGATIVE — SIGNIFICANT CHANGE UP
T PALLIDUM AB TITR SER: NEGATIVE — SIGNIFICANT CHANGE UP
T3 SERPL-MCNC: 55 NG/DL — LOW (ref 80–200)
T4 AB SER-ACNC: 6.3 UG/DL — SIGNIFICANT CHANGE UP (ref 4.6–12)
TROPONIN T SERPL-MCNC: 1.94 NG/ML — HIGH (ref 0–0.06)
VZV IGM SER-ACNC: <0.91 INDEX — SIGNIFICANT CHANGE UP (ref 0–0.9)
WBC # BLD: 13.9 K/UL — HIGH (ref 3.8–10.5)
WBC # BLD: 14 K/UL — HIGH (ref 3.8–10.5)
WBC # BLD: 14.3 K/UL — HIGH (ref 3.8–10.5)
WBC # BLD: 15.1 K/UL — HIGH (ref 3.8–10.5)
WBC # FLD AUTO: 13.9 K/UL — HIGH (ref 3.8–10.5)
WBC # FLD AUTO: 14 K/UL — HIGH (ref 3.8–10.5)
WBC # FLD AUTO: 14.3 K/UL — HIGH (ref 3.8–10.5)
WBC # FLD AUTO: 15.1 K/UL — HIGH (ref 3.8–10.5)

## 2018-05-03 PROCEDURE — 93930 UPPER EXTREMITY STUDY: CPT | Mod: 26

## 2018-05-03 PROCEDURE — 99232 SBSQ HOSP IP/OBS MODERATE 35: CPT

## 2018-05-03 PROCEDURE — 76700 US EXAM ABDOM COMPLETE: CPT | Mod: 26

## 2018-05-03 PROCEDURE — 99233 SBSQ HOSP IP/OBS HIGH 50: CPT | Mod: GC

## 2018-05-03 PROCEDURE — 94010 BREATHING CAPACITY TEST: CPT | Mod: 26

## 2018-05-03 PROCEDURE — 74018 RADEX ABDOMEN 1 VIEW: CPT | Mod: 26

## 2018-05-03 PROCEDURE — 99291 CRITICAL CARE FIRST HOUR: CPT

## 2018-05-03 PROCEDURE — 93010 ELECTROCARDIOGRAM REPORT: CPT

## 2018-05-03 RX ORDER — POTASSIUM CHLORIDE 20 MEQ
40 PACKET (EA) ORAL EVERY 4 HOURS
Qty: 0 | Refills: 0 | Status: COMPLETED | OUTPATIENT
Start: 2018-05-03 | End: 2018-05-03

## 2018-05-03 RX ORDER — TICAGRELOR 90 MG/1
90 TABLET ORAL
Qty: 0 | Refills: 0 | Status: DISCONTINUED | OUTPATIENT
Start: 2018-05-03 | End: 2018-05-04

## 2018-05-03 RX ORDER — POTASSIUM CHLORIDE 20 MEQ
10 PACKET (EA) ORAL ONCE
Qty: 0 | Refills: 0 | Status: COMPLETED | OUTPATIENT
Start: 2018-05-03 | End: 2018-05-03

## 2018-05-03 RX ORDER — CAPTOPRIL 12.5 MG/1
12.5 TABLET ORAL ONCE
Qty: 0 | Refills: 0 | Status: COMPLETED | OUTPATIENT
Start: 2018-05-03 | End: 2018-05-03

## 2018-05-03 RX ORDER — CAPTOPRIL 12.5 MG/1
6.25 TABLET ORAL THREE TIMES A DAY
Qty: 0 | Refills: 0 | Status: DISCONTINUED | OUTPATIENT
Start: 2018-05-03 | End: 2018-05-03

## 2018-05-03 RX ORDER — VANCOMYCIN HCL 1 G
1250 VIAL (EA) INTRAVENOUS ONCE
Qty: 0 | Refills: 0 | Status: DISCONTINUED | OUTPATIENT
Start: 2018-05-03 | End: 2018-05-03

## 2018-05-03 RX ORDER — ASPIRIN/CALCIUM CARB/MAGNESIUM 324 MG
81 TABLET ORAL DAILY
Qty: 0 | Refills: 0 | Status: DISCONTINUED | OUTPATIENT
Start: 2018-05-03 | End: 2018-05-05

## 2018-05-03 RX ORDER — MAGNESIUM SULFATE 500 MG/ML
1 VIAL (ML) INJECTION ONCE
Qty: 0 | Refills: 0 | Status: COMPLETED | OUTPATIENT
Start: 2018-05-03 | End: 2018-05-03

## 2018-05-03 RX ORDER — MUPIROCIN 20 MG/G
1 OINTMENT TOPICAL
Qty: 0 | Refills: 0 | Status: DISCONTINUED | OUTPATIENT
Start: 2018-05-03 | End: 2018-05-03

## 2018-05-03 RX ORDER — FENTANYL CITRATE 50 UG/ML
25 INJECTION INTRAVENOUS ONCE
Qty: 0 | Refills: 0 | Status: DISCONTINUED | OUTPATIENT
Start: 2018-05-03 | End: 2018-05-03

## 2018-05-03 RX ORDER — AMIODARONE HYDROCHLORIDE 400 MG/1
400 TABLET ORAL
Qty: 0 | Refills: 0 | Status: DISCONTINUED | OUTPATIENT
Start: 2018-05-03 | End: 2018-05-05

## 2018-05-03 RX ORDER — POTASSIUM CHLORIDE 20 MEQ
20 PACKET (EA) ORAL ONCE
Qty: 0 | Refills: 0 | Status: COMPLETED | OUTPATIENT
Start: 2018-05-03 | End: 2018-05-03

## 2018-05-03 RX ORDER — CAPTOPRIL 12.5 MG/1
12.5 TABLET ORAL THREE TIMES A DAY
Qty: 0 | Refills: 0 | Status: DISCONTINUED | OUTPATIENT
Start: 2018-05-03 | End: 2018-05-05

## 2018-05-03 RX ORDER — CHLORHEXIDINE GLUCONATE 213 G/1000ML
30 SOLUTION TOPICAL ONCE
Qty: 0 | Refills: 0 | Status: DISCONTINUED | OUTPATIENT
Start: 2018-05-03 | End: 2018-05-03

## 2018-05-03 RX ORDER — SENNA PLUS 8.6 MG/1
2 TABLET ORAL AT BEDTIME
Qty: 0 | Refills: 0 | Status: DISCONTINUED | OUTPATIENT
Start: 2018-05-03 | End: 2018-05-05

## 2018-05-03 RX ORDER — BUMETANIDE 0.25 MG/ML
2 INJECTION INTRAMUSCULAR; INTRAVENOUS ONCE
Qty: 0 | Refills: 0 | Status: COMPLETED | OUTPATIENT
Start: 2018-05-03 | End: 2018-05-03

## 2018-05-03 RX ORDER — DOCUSATE SODIUM 100 MG
100 CAPSULE ORAL DAILY
Qty: 0 | Refills: 0 | Status: DISCONTINUED | OUTPATIENT
Start: 2018-05-03 | End: 2018-05-05

## 2018-05-03 RX ORDER — SIMETHICONE 80 MG/1
80 TABLET, CHEWABLE ORAL ONCE
Qty: 0 | Refills: 0 | Status: COMPLETED | OUTPATIENT
Start: 2018-05-03 | End: 2018-05-03

## 2018-05-03 RX ORDER — CIPROFLOXACIN LACTATE 400MG/40ML
400 VIAL (ML) INTRAVENOUS ONCE
Qty: 0 | Refills: 0 | Status: DISCONTINUED | OUTPATIENT
Start: 2018-05-03 | End: 2018-05-03

## 2018-05-03 RX ORDER — BUMETANIDE 0.25 MG/ML
2 INJECTION INTRAMUSCULAR; INTRAVENOUS ONCE
Qty: 0 | Refills: 0 | Status: DISCONTINUED | OUTPATIENT
Start: 2018-05-03 | End: 2018-05-03

## 2018-05-03 RX ORDER — POTASSIUM CHLORIDE 20 MEQ
20 PACKET (EA) ORAL
Qty: 0 | Refills: 0 | Status: DISCONTINUED | OUTPATIENT
Start: 2018-05-03 | End: 2018-05-03

## 2018-05-03 RX ORDER — SODIUM,POTASSIUM PHOSPHATES 278-250MG
1 POWDER IN PACKET (EA) ORAL
Qty: 0 | Refills: 0 | Status: DISCONTINUED | OUTPATIENT
Start: 2018-05-03 | End: 2018-05-03

## 2018-05-03 RX ORDER — FLUCONAZOLE 150 MG/1
400 TABLET ORAL ONCE
Qty: 0 | Refills: 0 | Status: DISCONTINUED | OUTPATIENT
Start: 2018-05-03 | End: 2018-05-03

## 2018-05-03 RX ORDER — CHLORHEXIDINE GLUCONATE 213 G/1000ML
1 SOLUTION TOPICAL ONCE
Qty: 0 | Refills: 0 | Status: DISCONTINUED | OUTPATIENT
Start: 2018-05-03 | End: 2018-05-03

## 2018-05-03 RX ORDER — POTASSIUM PHOSPHATE, MONOBASIC POTASSIUM PHOSPHATE, DIBASIC 236; 224 MG/ML; MG/ML
15 INJECTION, SOLUTION INTRAVENOUS ONCE
Qty: 0 | Refills: 0 | Status: COMPLETED | OUTPATIENT
Start: 2018-05-03 | End: 2018-05-03

## 2018-05-03 RX ORDER — AMIODARONE HYDROCHLORIDE 400 MG/1
200 TABLET ORAL DAILY
Qty: 0 | Refills: 0 | Status: DISCONTINUED | OUTPATIENT
Start: 2018-05-16 | End: 2018-05-05

## 2018-05-03 RX ADMIN — HEPARIN SODIUM 1200 UNIT(S)/HR: 5000 INJECTION INTRAVENOUS; SUBCUTANEOUS at 06:40

## 2018-05-03 RX ADMIN — Medication 20 MILLIEQUIVALENT(S): at 00:59

## 2018-05-03 RX ADMIN — CAPTOPRIL 12.5 MILLIGRAM(S): 12.5 TABLET ORAL at 17:20

## 2018-05-03 RX ADMIN — Medication 40 MILLIEQUIVALENT(S): at 13:04

## 2018-05-03 RX ADMIN — FENTANYL CITRATE 25 MICROGRAM(S): 50 INJECTION INTRAVENOUS at 18:15

## 2018-05-03 RX ADMIN — Medication 10 MILLIEQUIVALENT(S): at 06:39

## 2018-05-03 RX ADMIN — Medication 50 MILLIEQUIVALENT(S): at 01:32

## 2018-05-03 RX ADMIN — MILRINONE LACTATE 9.78 MICROGRAM(S)/KG/MIN: 1 INJECTION, SOLUTION INTRAVENOUS at 05:10

## 2018-05-03 RX ADMIN — Medication 100 MILLIGRAM(S): at 17:52

## 2018-05-03 RX ADMIN — FENTANYL CITRATE 25 MICROGRAM(S): 50 INJECTION INTRAVENOUS at 17:55

## 2018-05-03 RX ADMIN — POTASSIUM PHOSPHATE, MONOBASIC POTASSIUM PHOSPHATE, DIBASIC 62.5 MILLIMOLE(S): 236; 224 INJECTION, SOLUTION INTRAVENOUS at 06:39

## 2018-05-03 RX ADMIN — Medication 81 MILLIGRAM(S): at 17:52

## 2018-05-03 RX ADMIN — CAPTOPRIL 6.25 MILLIGRAM(S): 12.5 TABLET ORAL at 13:04

## 2018-05-03 RX ADMIN — SIMETHICONE 80 MILLIGRAM(S): 80 TABLET, CHEWABLE ORAL at 14:13

## 2018-05-03 RX ADMIN — MILRINONE LACTATE 13.04 MICROGRAM(S)/KG/MIN: 1 INJECTION, SOLUTION INTRAVENOUS at 21:41

## 2018-05-03 RX ADMIN — BUMETANIDE 2 MILLIGRAM(S): 0.25 INJECTION INTRAMUSCULAR; INTRAVENOUS at 17:24

## 2018-05-03 RX ADMIN — Medication 100 GRAM(S): at 22:07

## 2018-05-03 RX ADMIN — CAPTOPRIL 12.5 MILLIGRAM(S): 12.5 TABLET ORAL at 21:38

## 2018-05-03 RX ADMIN — TICAGRELOR 90 MILLIGRAM(S): 90 TABLET ORAL at 05:08

## 2018-05-03 RX ADMIN — SENNA PLUS 2 TABLET(S): 8.6 TABLET ORAL at 21:38

## 2018-05-03 RX ADMIN — TICAGRELOR 90 MILLIGRAM(S): 90 TABLET ORAL at 17:52

## 2018-05-03 RX ADMIN — AMIODARONE HYDROCHLORIDE 400 MILLIGRAM(S): 400 TABLET ORAL at 16:15

## 2018-05-03 NOTE — DIETITIAN INITIAL EVALUATION ADULT. - PHYSICAL APPEARANCE
overweight/Pt agreeable to nutrition focused physical exam, no evidence of muscle or fat loss found.

## 2018-05-03 NOTE — DIETITIAN INITIAL EVALUATION ADULT. - OTHER INFO
Pt denies changes in weight PTA. Noted admission weight 191.5pounds, suspect pt with 11.5pound weight gain likely due to fluid shifts as pt with decompensated HF and 1+generalized edema. No known food allergies or intolerances reported. Pt denies micronutrient supplementation PTA. Currently with a good appetite and states he is eating ~50% of meals. Pt states this quantity of food is similar to what he eats at home and refused oral nutritional supplements at this time. Pt denies GI distress with last BM passed yesterday. Reports no difficulty chewing/swallowing. Assessed knowledge on therapeutic diet guidelines for heart health/CHF and identified knowledge gaps. Pt made aware of relationship between diet guidelines and health/disease. Provided education on therapeutic diet guidelines, pt willing to make identified changes.

## 2018-05-03 NOTE — CHART NOTE - NSCHARTNOTEFT_GEN_A_CORE
====================  CCU MIDNIGHT ROUNDS  ====================    SHANIQUA MCKENZIE  [id number]    ====================  SUMMARY: HPI:  This is a 62 y/o M with CAD s/p MI and cardiac arrests, remote stents x 10 (2005), HFrEF s/p AICD ([employer]) with battery change 3 years ago, AAA, gastic ulcers, HLD, and BPH presented to [facility] c/o difficulty breathing and abdominal pain with wretching which they attributed to constipation.  Per patient’s spouse, he has been intermittent non-radiating chest pain x 1 month but refused to come to the ED.  Most of the info is obtained from patient’s spouse.  He has been chronically c/o dizziness and left arm pain since ICD implantation.  Patient has been off his medications (ASA, Plavix, and Lipitor) x 1 month and Entresto 8 months ago due to lack of insurance.  Denies CP, DAVIS, orthopnea, weight gain, fever, sick contacts, or recent travels.  Of note, patient has had difficulty remembering events since his cardiac arrest in 2005, according to his neurologist due to anoxia per spouse.  In OSH ED, EKG showed LBBB and elevated cardiac enzymes (YTV=138, CK/MB=20.7, Trop I=27.5).  Transferred to [facility] for concern of STEMI.  CTA of the chest, abdomen, and pelvis done showing no evidence of aortic dissection; 3.7 cm infrarenal abdominal aortic aneurysm with partial thrombotic plaque.  No central pulmonary embolism.  Small bilateral pleural effusions.  CT head showed no intracranial hemorrhage, mass effect or large acute cortical infarct.    Upon arrival to PICU, patient is pain-free, denies difficulty breathing but c/o back pain, attributing it to the stretcher. (01 May 2018 05:16)    ====================        ====================  NEW EVENTS:  ====================        ====================  VITALS (Last 12 hrs):  ====================    T(C): 37.3 (05-02-18 @ 22:00), Max: 37.3 (05-02-18 @ 22:00)  HR: 92 (05-03-18 @ 01:00) (92 - 98)  BP: --  BP(mean): --  ABP: 108/54 (05-03-18 @ 01:00) (72/42 - 120/108)  ABP(mean): 74 (05-03-18 @ 01:00) (56 - 112)  RR: 20 (05-03-18 @ 01:00) (8 - 23)  SpO2: 98% (05-03-18 @ 01:00) (96% - 99%)  Wt(kg): --  CVP(mm Hg): 6 (05-03-18 @ 01:00) (4 - 14)  CO: 3.9 (05-03-18 @ 00:00) (3.4 - 3.9)  CI: 2 (05-03-18 @ 00:00) (1.7 - 2)  PA:  (37/21 - 56/33)  PA(mean): 29 (05-03-18 @ 01:00) (27 - 42)  PA(direct): --  PCWP: --  SVR: 1495 (05-03-18 @ 00:00) (1269 - 1495)    TELEMETRY:        *BLOOD GAS/ARTERIAL/MIXED/VENOUS  *LACTATE    I&O's Summary    01 May 2018 07:01  -  02 May 2018 07:00  --------------------------------------------------------  IN: 890.9 mL / OUT: 4555 mL / NET: -3664.1 mL    02 May 2018 07:01  -  03 May 2018 01:30  --------------------------------------------------------  IN: 2087.1 mL / OUT: 3015 mL / NET: -927.9 mL        ====================  PLAN:  ====================    HEALTH ISSUES - PROBLEM Dx:  Systolic CHF, acute on chronic: Systolic CHF, acute on chronic  NSTEMI (non-ST elevated myocardial infarction): NSTEMI (non-ST elevated myocardial infarction)  JANENE (acute kidney injury): JANENE (acute kidney injury)  Need for prophylactic measure: Need for prophylactic measure  Hyperlipidemia, unspecified hyperlipidemia type: Hyperlipidemia, unspecified hyperlipidemia type  Chronic systolic heart failure: Chronic systolic heart failure  Coronary artery disease involving native coronary artery of native heart without angina pectoris: Coronary artery disease involving native coronary artery of native heart without angina pectoris        HEALTH ISSUES - R/O PROBLEM Dx:      Brandin Munguia MD: [phone] ====================  CCU MIDNIGHT ROUNDS  ====================    SHANIQUA MCKENZIE  [id number]    ====================  SUMMARY: HPI:  This is a 64 y/o M with CAD s/p MI and cardiac arrests, remote stents x 10 (2005), HFrEF s/p AICD ([employer]) with battery change 3 years ago, AAA, gastic ulcers, HLD, and BPH presented to [facility] c/o difficulty breathing and abdominal pain with wretching which they attributed to constipation.  Per patient’s spouse, he has been intermittent non-radiating chest pain x 1 month but refused to come to the ED.  Most of the info is obtained from patient’s spouse.  He has been chronically c/o dizziness and left arm pain since ICD implantation.  Patient has been off his medications (ASA, Plavix, and Lipitor) x 1 month and Entresto 8 months ago due to lack of insurance.  Denies CP, DAVIS, orthopnea, weight gain, fever, sick contacts, or recent travels.  Of note, patient has had difficulty remembering events since his cardiac arrest in 2005, according to his neurologist due to anoxia per spouse.  In OSH ED, EKG showed LBBB and elevated cardiac enzymes (JQX=900, CK/MB=20.7, Trop I=27.5).  Transferred to [facility] for concern of STEMI.  CTA of the chest, abdomen, and pelvis done showing no evidence of aortic dissection; 3.7 cm infrarenal abdominal aortic aneurysm with partial thrombotic plaque.  No central pulmonary embolism.  Small bilateral pleural effusions.  CT head showed no intracranial hemorrhage, mass effect or large acute cortical infarct.    Upon arrival to PICU, patient is pain-free, denies difficulty breathing but c/o back pain, attributing it to the stretcher. (01 May 2018 05:16)    ====================        ====================  NEW EVENTS: Pt having frequent PVCs on tele but hemodynamically stable and asymptomatic.   ====================        ====================  VITALS (Last 12 hrs):  ====================    T(C): 37.3 (05-02-18 @ 22:00), Max: 37.3 (05-02-18 @ 22:00)  HR: 92 (05-03-18 @ 01:00) (92 - 98)  BP: --  BP(mean): --  ABP: 108/54 (05-03-18 @ 01:00) (72/42 - 120/108)  ABP(mean): 74 (05-03-18 @ 01:00) (56 - 112)  RR: 20 (05-03-18 @ 01:00) (8 - 23)  SpO2: 98% (05-03-18 @ 01:00) (96% - 99%)  Wt(kg): --  CVP(mm Hg): 6 (05-03-18 @ 01:00) (4 - 14)  CO: 3.9 (05-03-18 @ 00:00) (3.4 - 3.9)  CI: 2 (05-03-18 @ 00:00) (1.7 - 2)  PA:  (37/21 - 56/33)  PA(mean): 29 (05-03-18 @ 01:00) (27 - 42)  PA(direct): --  PCWP: --  SVR: 1495 (05-03-18 @ 00:00) (1269 - 1495)    TELEMETRY:        *BLOOD GAS/ARTERIAL/MIXED/VENOUS  *LACTATE    I&O's Summary    01 May 2018 07:01  -  02 May 2018 07:00  --------------------------------------------------------  IN: 890.9 mL / OUT: 4555 mL / NET: -3664.1 mL    02 May 2018 07:01  -  03 May 2018 01:30  --------------------------------------------------------  IN: 2087.1 mL / OUT: 3015 mL / NET: -927.9 mL        ====================  PLAN:  ====================    HEALTH ISSUES - PROBLEM Dx:  Systolic CHF, acute on chronic: Systolic CHF, acute on chronic  NSTEMI (non-ST elevated myocardial infarction): NSTEMI (non-ST elevated myocardial infarction)  JANENE (acute kidney injury): JANENE (acute kidney injury)  Need for prophylactic measure: Need for prophylactic measure  Hyperlipidemia, unspecified hyperlipidemia type: Hyperlipidemia, unspecified hyperlipidemia type  Chronic systolic heart failure: Chronic systolic heart failure  Coronary artery disease involving native coronary artery of native heart without angina pectoris: Coronary artery disease involving native coronary artery of native heart without angina pectoris        HEALTH ISSUES - R/O PROBLEM Dx:      Brandin Munguia MD: [phone] ====================  CCU MIDNIGHT ROUNDS  ====================    SHANIQUA MCKENZIE  [id number]    ====================  SUMMARY: HPI:  This is a 64 y/o M with CAD s/p MI and cardiac arrests, remote stents x 10 (2005), HFrEF s/p AICD ([employer]) with battery change 3 years ago, AAA, gastic ulcers, HLD, and BPH presented to [facility] c/o difficulty breathing and abdominal pain with wretching which they attributed to constipation.  Per patient’s spouse, he has been intermittent non-radiating chest pain x 1 month but refused to come to the ED.  Most of the info is obtained from patient’s spouse.  He has been chronically c/o dizziness and left arm pain since ICD implantation.  Patient has been off his medications (ASA, Plavix, and Lipitor) x 1 month and Entresto 8 months ago due to lack of insurance.  Denies CP, DAVIS, orthopnea, weight gain, fever, sick contacts, or recent travels.  Of note, patient has had difficulty remembering events since his cardiac arrest in 2005, according to his neurologist due to anoxia per spouse.  In OSH ED, EKG showed LBBB and elevated cardiac enzymes (KFZ=046, CK/MB=20.7, Trop I=27.5).  Transferred to [facility] for concern of STEMI.  CTA of the chest, abdomen, and pelvis done showing no evidence of aortic dissection; 3.7 cm infrarenal abdominal aortic aneurysm with partial thrombotic plaque.  No central pulmonary embolism.  Small bilateral pleural effusions.  CT head showed no intracranial hemorrhage, mass effect or large acute cortical infarct.    Upon arrival to PICU, patient is pain-free, denies difficulty breathing but c/o back pain, attributing it to the stretcher. (01 May 2018 05:16)    ====================        ====================  NEW EVENTS: PT with NSVT earlier today and started on an amiodarone drip. He is still having frequent PVCs on tele but has been hemodynamically stable and asymptomatic. Mixed venous 57, CO 4.44, CI 2.23 net, CVP 5-7, negative 730 cc.   ====================        ====================  VITALS (Last 12 hrs):  ====================    T(C): 37.3 (05-02-18 @ 22:00), Max: 37.3 (05-02-18 @ 22:00)  HR: 92 (05-03-18 @ 01:00) (92 - 98)  BP: --  BP(mean): --  ABP: 108/54 (05-03-18 @ 01:00) (72/42 - 120/108)  ABP(mean): 74 (05-03-18 @ 01:00) (56 - 112)  RR: 20 (05-03-18 @ 01:00) (8 - 23)  SpO2: 98% (05-03-18 @ 01:00) (96% - 99%)  Wt(kg): --  CVP(mm Hg): 6 (05-03-18 @ 01:00) (4 - 14)  CO: 3.9 (05-03-18 @ 00:00) (3.4 - 3.9)  CI: 2 (05-03-18 @ 00:00) (1.7 - 2)  PA:  (37/21 - 56/33)  PA(mean): 29 (05-03-18 @ 01:00) (27 - 42)  PA(direct): --  PCWP: --  SVR: 1495 (05-03-18 @ 00:00) (1269 - 1495)    TELEMETRY:        *BLOOD GAS/ARTERIAL/MIXED/VENOUS  *LACTATE    I&O's Summary    01 May 2018 07:01  -  02 May 2018 07:00  --------------------------------------------------------  IN: 890.9 mL / OUT: 4555 mL / NET: -3664.1 mL    02 May 2018 07:01  -  03 May 2018 01:30  --------------------------------------------------------  IN: 2087.1 mL / OUT: 3015 mL / NET: -927.9 mL        ====================  ASSESSMENT:  64 y/o M with CAD s/p MI and cardiac arrests, PCI x 10 in past, HFrEF (biventricular dysfunction, EF 18%, severe MR) s/p AICD, AAA, presenting with cardiogenic shock c/b multiorgan failure, pulmonary edema requiring BIPAP, found to have triple vessel disease, currently with swan and IABP on milrinone and furosemide drips, LVAD and heart transplant evaluation initiated.     PLAN  1. Cardiogenic shock and HFrEF, with swan and IABP  - Trend hemodynamics, mixed venous 57, CO 4.44, CI 2.23 net, CVP 5-7  - Monitor urine output and CVP, goal 6-8, bumetanide pushes if CVP > 8 or becoming net positive  - Continue milrinone gtt at 0.375  - Follow up advanced HF  - CT surgery consulted for LVAD/transplant evaluation  2. NSVT: continue amiodarone gtt, monitor and replete electrolytes K>4, Mg>2  3. NSTEMI, Triple Vessel CAD s/p stents  - Continue aspirin 81 mg, brillinta 90 mg BID  - Trend cardiac enzymes  4. Transaminitis: currently improving, trend CMP and PT/INR, avoid hepatotoxic agents, dose medications based on hepatic function,  currently holding atorvastatin  5. JANENE: Cr improving, likely prerenal JANENE on CKD in setting of cardiogenic shock    ====================    HEALTH ISSUES - PROBLEM Dx:  Systolic CHF, acute on chronic: Systolic CHF, acute on chronic  NSTEMI (non-ST elevated myocardial infarction): NSTEMI (non-ST elevated myocardial infarction)  JANENE (acute kidney injury): JANENE (acute kidney injury)  Need for prophylactic measure: Need for prophylactic measure  Hyperlipidemia, unspecified hyperlipidemia type: Hyperlipidemia, unspecified hyperlipidemia type  Chronic systolic heart failure: Chronic systolic heart failure  Coronary artery disease involving native coronary artery of native heart without angina pectoris: Coronary artery disease involving native coronary artery of native heart without angina pectoris        HEALTH ISSUES - R/O PROBLEM Dx:      Brandin Munguia MD: [phone]

## 2018-05-03 NOTE — CHART NOTE - NSCHARTNOTEFT_GEN_A_CORE
Palliative care note:     A palliative care consult was initially called for a LVAD eval; however, when the patient was to be seen by the palliative care team, he was on VT. Today, it was indicated he was not a candidate for advanced therapies including LVAD. The CCU team is now working on trying to remove the Balloon pump and titrate down IV medications. HF and CCU already discussed with the patient's wife about hospice services and likely considering home with hospice while on Milrinone. As d/w the CCU fellow, CCU team will further discuss code status and deactivation of the AICD. CCU SW already called for a hospice eval. Will hold on a palliative consult for now. Please call us if having issues with symptomatic Rx or GOC.     Surjit Eason MD.   [phone]

## 2018-05-03 NOTE — DIETITIAN INITIAL EVALUATION ADULT. - ENERGY NEEDS
Ht:5ft7in, Wt:191.5pounds, BMI:30.1,   IBW:148pounds (+/-10%), 129%IBW  Pertinent Information: Pt admitted for NSTEMI c/b acute on chronic decompensated systolic heart failure, a/w JANENE and transaminitis (likely shock liver), undergoing evaluation for LVAD vs heart transplant, currently with IABP, and acute pulmonary edema as per chart. Pt with 1+generalized edema, no pressure ulcers noted.

## 2018-05-03 NOTE — PROGRESS NOTE ADULT - ASSESSMENT
62 y/o M with CAD s/p MI and cardiac arrests, remote stents x 10 (2005), HFrEF s/p AICD ([employer]) with battery change 3 years ago, AAA, gastic ulcers, HLD, and BPH admitted for NSTEMI c/b acute on chronic decompensated systolic heart failure, requiring CCU for acute pulmonary edema    Neuro:  - Awake, alert, no issues.     CV:  Acute on Chronic Systolic Heart Failure  - EF approx 17% w/ severe LV dysfunction and MR  - Keep CVP~ 5-6  - IABP, PA catheter in place  - Continue Milrinone gtt  - LVAD work up per CT surg  NSTEMI  - S/p catheterization revealing triple vessel disease  - CT surgery, heart failure consulting  - Continue aspirin, brilinta, atorvastatin, ACS heparin anticoagulation  Non-sustained Ventricular Tachycardia  - Amiodarone infusion  - Add lidocaine for recurrent VT    Pulm:  Acute Pulmonary Edema  - Improved after diuresis  - Monitor off BiPAP  - Oxygen PRN to maintain SpO2 >92%    GI:  - DASH diet  - Significant transaminitis likely from shock liver, improving    :   Acute Kidney Injury  - Creatinine 2.24 on presentation, improving  - Continue to trend BUN/Cr  - Keep net negative.   - Maintain strict Ins and Outs  - Aggressively replete electrolytes    Heme:  - Trend CBC  - INR normalizing  - DAPT, heparin gtt    Endo:  - A1c, TSH normal  - Monitor glucose on labs    ID:  - Afebrile, no signs of infection  - Monitor for fevers, leukocytosis.    Dispo; CCU 64 y/o M with CAD s/p MI and cardiac arrests, remote stents x 10 (2005), HFrEF s/p AICD ([employer]) with battery change 3 years ago, AAA, gastic ulcers, HLD, and BPH admitted for NSTEMI c/b acute on chronic decompensated systolic heart failure, requiring CCU for acute pulmonary edema    Neuro:  - Awake, alert, no issues.     CV:  Acute on Chronic Systolic Heart Failure  - EF approx 17% w/ severe LV dysfunction and MR  - Keep CVP~ 5-6  - IABP, PA catheter in place  - Continue Milrinone gtt  - LVAD work up per CT surg  NSTEMI  - S/p catheterization revealing triple vessel disease  - CT surgery, heart failure consulting  - Hold antiplatelet agents for VAD tomorrow  - Continue Heparin  Non-sustained Ventricular Tachycardia  - Amiodarone infusion  - Add lidocaine for recurrent VT    Pulm:  Acute Pulmonary Edema  - Improved after diuresis  - Monitor off BiPAP  - Oxygen PRN to maintain SpO2 >92%    GI:  - DASH diet  - Significant transaminitis likely from shock liver, improving    :   Acute Kidney Injury  - Creatinine 2.24 on presentation, improving  - Continue to trend BUN/Cr  - Keep net negative.   - Maintain strict Ins and Outs  - Aggressively replete electrolytes    Heme:  - Trend CBC  - INR normalizing  - DAPT, heparin gtt    Endo:  - A1c, TSH normal  - Monitor glucose on labs    ID:  - Afebrile, no signs of infection  - Monitor for fevers, leukocytosis.    Dispo; CCU

## 2018-05-03 NOTE — CHART NOTE - NSCHARTNOTEFT_GEN_A_CORE
I reviewed peripheral vascular imaging for Mr. Franklin with our team.  His inflow arteries (external iliac arteries) are of small caliber and more advanced therapies such as Impella via groin access may create an occlusive situation and potential for a threatened limb.  The Left subclavian appears patent, and should allow for a Left axially impella if needed.    In regards to his Abdominal aortic aneurysm - would continue annual surveillance.    If more advanced mechanical support is needed, the Left axillary artery will be preferred.     Thanks    Sky Hudson  Vascular Medicine   [id number]    I discussed this with Dr. Retana.

## 2018-05-03 NOTE — DIETITIAN INITIAL EVALUATION ADULT. - ADHERENCE
poor/low sodium. Pt states his wife uses salt in the cooking, and he sometimes adds salt to meals. Food recall also includes high sodium foods. Food recall as follows: Breakfast can be coffee with cake or toast. Lunch is packaged noodles (example: cup of soup). Dinner is a balanced meal prepared by wife. Pt states protein sources include mostly beef and pork, as well as chicken and fish. Pt states he also eats fruit, vegetables, and sweets.

## 2018-05-03 NOTE — PROGRESS NOTE ADULT - SUBJECTIVE AND OBJECTIVE BOX
24H hour events: No acute events overnight. Lengthy discussions at LVAD/ transplant listing meeting today regarding patient. He is not a candidate for advanced therapies. Family discussion followed the meeting , with all parties in agreement     MEDICATIONS:  amiodarone    Tablet 400 milliGRAM(s) Oral two times a day  amiodarone    Tablet 200 milliGRAM(s) Oral daily  captopril 12.5 milliGRAM(s) Oral three times a day  heparin  Infusion.  Unit(s)/Hr IV Continuous <Continuous>  heparin  Injectable 5300 Unit(s) IV Push every 6 hours PRN  milrinone Infusion 0.5 MICROgram(s)/kG/Min IV Continuous <Continuous>  aluminum hydroxide/magnesium hydroxide/simethicone Suspension 30 milliLiter(s) Oral every 6 hours PRN  influenza   Vaccine 0.5 milliLiter(s) IntraMuscular once    REVIEW OF SYSTEMS:  Complete 10point ROS negative except as documented above.    PHYSICAL EXAM:  T(C): 37.4 (05-03-18 @ 16:00), Max: 37.4 (05-03-18 @ 16:00)  HR: 94 (05-03-18 @ 17:00) (84 - 98)  BP: --  RR: 15 (05-03-18 @ 17:00) (8 - 23)  SpO2: 99% (05-03-18 @ 17:00) (96% - 100%)  Wt(kg): --  I&O's Summary    02 May 2018 07:01  -  03 May 2018 07:00  --------------------------------------------------------  IN: 2600.6 mL / OUT: 3530 mL / NET: -929.4 mL    03 May 2018 07:01  -  03 May 2018 17:29  --------------------------------------------------------  IN: 642.4 mL / OUT: 615 mL / NET: 27.4 mL    Appeararce: Normal	  HEENT:   Normal oral mucosa, PERRL, EOMI	  Lymphatic: No lymphadenopathy  Cardiovascular: Normal S1 S2, No JVD, No murmurs, No edema  Respiratory: Lungs clear to auscultation	  Psychiatry: A & O x 3, Mood & affect appropriate  Gastrointestinal:  Soft, Non-tender, + BS	  Skin: No rashes, No ecchymoses, No cyanosis	  Neurologic: Non-focal  Extremities: Normal range of motion, No clubbing, cyanosis or edema  Vascular: Peripheral pulses palpable 2+ bilaterally    LABS:	 	    CBC Full  -  ( 03 May 2018 16:02 )  WBC Count : 14.0 K/uL  Hemoglobin : 12.0 g/dL  Hematocrit : 38.0 %  Platelet Count - Automated : 127 K/uL  Mean Cell Volume : 65.3 fl  Mean Cell Hemoglobin : 20.6 pg  Mean Cell Hemoglobin Concentration : 31.5 gm/dL  Auto Neutrophil # : x  Auto Lymphocyte # : x  Auto Monocyte # : x  Auto Eosinophil # : x  Auto Basophil # : x  Auto Neutrophil % : x  Auto Lymphocyte % : x  Auto Monocyte % : x  Auto Eosinophil % : x  Auto Basophil % : x    05-03    130<L>  |  97  |  21  ----------------------------<  131<H>  4.4   |  24  |  1.08  05-03    131<L>  |  97  |  26<H>  ----------------------------<  130<H>  3.9   |  24  |  1.17    Ca    7.8<L>      03 May 2018 16:02  Ca    7.9<L>      03 May 2018 11:35  Phos  1.8     05-03  Phos  1.9     05-02  Mg     2.2     05-03  Mg     2.2     05-02    TPro  6.1  /  Alb  3.4  /  TBili  2.5<H>  /  DBili  x   /  AST  434<H>  /  ALT  1308<H>  /  AlkPhos  99  05-03  TPro  5.8<L>  /  Alb  3.3  /  TBili  2.5<H>  /  DBili  x   /  AST  534<H>  /  ALT  1362<H>  /  AlkPhos  99  05-03    proBNP: Serum Pro-Brain Natriuretic Peptide: 67066 pg/mL (05-01 @ 06:35)    CARDIAC MARKERS:  Troponin I, Serum: 27.500 ng/mL (05-01 @ 01:37)    TELEMETRY: NSVT 4 beats 	      PREVIOUS DIAGNOSTIC TESTING:    [ ] Echocardiogram:  [ ]  Catheterization:  [ ] Stress Test:  	  	  ASSESSMENT/PLAN:

## 2018-05-03 NOTE — DIETITIAN INITIAL EVALUATION ADULT. - NS AS NUTRI INTERV ED CONTENT
Provided verbal and written instruction on medical nutrition therapy for heart health and CHF./Priority modifications/Purpose of the nutrition education/Nutrition relationship to health/disease/Recommended modifications

## 2018-05-03 NOTE — PROGRESS NOTE ADULT - ATTENDING COMMENTS
Patient is seen and examined with fellow, NP and the CCU house-staff. I agree with the history, physical and the assessment and plan.  cardiogenic shock with renal function and LFT improvement  short bursts of NSVT   c/w ionotropic support  keep CVP ~5  monitor urine output  may need another bumex later today

## 2018-05-03 NOTE — PROGRESS NOTE ADULT - SUBJECTIVE AND OBJECTIVE BOX
INFECTIOUS DISEASES FOLLOW UP-- Berta Mcnally  [phone]    This is a follow up note for this  63yMale with  Other current complications following acute myocardial infarction with class IV NYHA CHF with IABP in place      ROS:  CONSTITUTIONAL:  No fever, good appetite, memory at baseline  CARDIOVASCULAR:  No chest pain or palpitations  RESPIRATORY:  No dyspnea  GASTROINTESTINAL:  No nausea, vomiting, diarrhea, or abdominal pain  GENITOURINARY:  No dysuria  NEUROLOGIC:  No headache,     Allergies    No Known Allergies    Intolerances        ANTIBIOTICS/RELEVANT:  antimicrobials    immunologic:  influenza   Vaccine 0.5 milliLiter(s) IntraMuscular once    OTHER:  aluminum hydroxide/magnesium hydroxide/simethicone Suspension 30 milliLiter(s) Oral every 6 hours PRN  amiodarone Infusion 0.5 mG/Min IV Continuous <Continuous>  captopril 6.25 milliGRAM(s) Oral three times a day  heparin  Infusion.  Unit(s)/Hr IV Continuous <Continuous>  heparin  Injectable 5300 Unit(s) IV Push every 6 hours PRN  milrinone Infusion 0.375 MICROgram(s)/kG/Min IV Continuous <Continuous>  potassium chloride    Tablet ER 40 milliEquivalent(s) Oral every 4 hours      Objective:  Vital Signs Last 24 Hrs  T(C): 37.1 (03 May 2018 12:00), Max: 37.3 (02 May 2018 22:00)  T(F): 98.8 (03 May 2018 12:00), Max: 99.1 (02 May 2018 22:00)  HR: 94 (03 May 2018 14:00) (84 - 98)  BP: --  BP(mean): --  RR: 16 (03 May 2018 14:00) (8 - 23)  SpO2: 99% (03 May 2018 14:00) (96% - 100%)    PHYSICAL EXAM:  Constitutional:no acute distress  Eyes:LINN, EOMI  Ear/Nose/Throat: no oral lesions, 	  Respiratory: clear BL anteriorly  Cardiovascular: S1S2  Gastrointestinal:soft, (+) BS, no tenderness  Extremities:no e/e/c, right leg with IABP  No Lymphadenopathy  IV sites not inflammed.    LABS:                        11.8   13.9  )-----------( 136      ( 03 May 2018 05:17 )             37.6     05-03    131<L>  |  97  |  26<H>  ----------------------------<  130<H>  3.9   |  24  |  1.17    Ca    7.9<L>      03 May 2018 11:35  Phos  1.8     05-  Mg     2.2     05-    TPro  5.8<L>  /  Alb  3.3  /  TBili  2.5<H>  /  DBili  x   /  AST  534<H>  /  ALT  1362<H>  /  AlkPhos  99  05-03    PT/INR - ( 03 May 2018 05:17 )   PT: 16.0 sec;   INR: 1.46 ratio         PTT - ( 03 May 2018 05:17 )  PTT:67.2 sec  Urinalysis Basic - ( 02 May 2018 21:26 )    Color: PL Yellow / Appearance: Clear / S.009 / pH: x  Gluc: x / Ketone: Negative  / Bili: Negative / Urobili: Negative   Blood: x / Protein: Negative / Nitrite: Negative   Leuk Esterase: Negative / RBC: x / WBC x   Sq Epi: x / Non Sq Epi: x / Bacteria: x        MICROBIOLOGY:        HIV-1/2 Antigen/Antibody Screen by [facility] (18 @ 02:09)    HIV-1/2 Combo Result: Nonreact: The HIV Ag/Ab Combo test performed screens for HIV-1 p24 antigen,  antibodies to HIV-1 (group M and group O), and antibodies to HIV-2. All  specimens repeatedly reactive will reflex to an HIV 1/2 antibody  confirmation and differentiation test. This assay detects p24 antigen  which may be present prior to the development of HIV antibodies,  therefore a reactive result with a negative HIV 1/2 AB Confirmation  should be followed up with HIV-1 RNA, HIV-2 RNA and repeat testing in 4-8  weeks. A nonreactive result does not preclude previous exposure to or  infection with HIV-1 or HIV-2. [facility] prohibits disclosure of this  result to any unauthorized party.        RECENT CULTURES:      RADIOLOGY & ADDITIONAL STUDIES:

## 2018-05-03 NOTE — PROVIDER CONTACT NOTE (OTHER) - ASSESSMENT
Pt alert and oriented, with periods of confusion. Pt vital signs stable on primacore and IABP. Pt c.o abdominal pain since last night.

## 2018-05-03 NOTE — PROGRESS NOTE ADULT - SUBJECTIVE AND OBJECTIVE BOX
CCU Daily Progress Note    Patient is a 63y old  Male who presents with a chief complaint of chest pain and SOB    Interval Events: Having frequent PVCs with 3 runs of NSVT yesterday afternoon terminated by ICD therapy. Amiodarone, milrinone infusions continue. LVAD work up ongoing.     HPI:  This is a 62 y/o M with CAD s/p MI and cardiac arrests, remote stents x 10 (2005), HFrEF s/p AICD ([employer]) with battery change 3 years ago, AAA, gastic ulcers, HLD, and BPH presented to [facility] c/o difficulty breathing and abdominal pain with wretching which they attributed to constipation.  Per patient’s spouse, he has been intermittent non-radiating chest pain x 1 month but refused to come to the ED.  Most of the info is obtained from patient’s spouse.  He has been chronically c/o dizziness and left arm pain since ICD implantation.  Patient has been off his medications (ASA, Plavix, and Lipitor) x 1 month and Entresto 8 months ago due to lack of insurance.  Denies CP, DAVIS, orthopnea, weight gain, fever, sick contacts, or recent travels.  Of note, patient has had difficulty remembering events since his cardiac arrest in 2005, according to his neurologist due to anoxia per spouse.  In OSH ED, EKG showed LBBB and elevated cardiac enzymes (VOT=701, CK/MB=20.7, Trop I=27.5).  Transferred to [facility] for concern of STEMI.  CTA of the chest, abdomen, and pelvis done showing no evidence of aortic dissection; 3.7 cm infrarenal abdominal aortic aneurysm with partial thrombotic plaque.  No central pulmonary embolism.  Small bilateral pleural effusions.  CT head showed no intracranial hemorrhage, mass effect or large acute cortical infarct.    Upon arrival to PICU, patient was pain-free, denies difficulty breathing but c/o back pain, attributing it to the stretcher. Loaded with ASA, Plavix, started on lopressor 12.5mg 2xd. Decompensated 5/1 AM requiring Lasix 80mg IVP and BIPAP support, transferred to CCU.     REVIEW OF SYSTEMS: Negative  	  MEDICATIONS  (STANDING):  amiodarone Infusion 0.5 mG/Min (16.667 mL/Hr) IV Continuous <Continuous>  aspirin  chewable 81 milliGRAM(s) Oral daily  heparin  Infusion.  Unit(s)/Hr (10 mL/Hr) IV Continuous <Continuous>  influenza   Vaccine 0.5 milliLiter(s) IntraMuscular once  milrinone Infusion 0.375 MICROgram(s)/kG/Min (9.776 mL/Hr) IV Continuous <Continuous>    MEDICATIONS  (PRN):  heparin  Injectable 5300 Unit(s) IV Push every 6 hours PRN For aPTT less than 40    PHYSICAL EXAM:  T(C): 36.8 (05-03-18 @ 07:00), Max: 37.3 (05-02-18 @ 22:00)  HR: 94 (05-03-18 @ 07:00) (84 - 98)  BP: --  BP(mean): --  ABP: 96/68 (05-03-18 @ 07:00) (72/42 - 120/108)  ABP(mean): 80 (05-03-18 @ 07:00) (56 - 112)  RR: 13 (05-03-18 @ 07:00) (8 - 23)  SpO2: 98% (05-03-18 @ 07:00) (94% - 100%)  Wt(kg): --  CVP(mm Hg): 4 (05-03-18 @ 07:00) (4 - 14)  CI: 1.9 (05-03-18 @ 05:00) (1.7 - 2)  CAPILLARY BLOOD GLUCOSE       N/A      05-02 @ 07:01  -  05-03 @ 07:00  --------------------------------------------------------  IN:    amiodarone Infusion: 116.9 mL    amiodarone Infusion: 266.5 mL    heparin  Infusion.: 276 mL    IV PiggyBack: 162.5 mL    milrinone  Infusion: 196 mL    milrinone  Infusion: 39.2 mL    Oral Fluid: 1105 mL    Solution: 400 mL  Total IN: 2562.1 mL    OUT:    Indwelling Catheter - Urethral: 3500 mL  Total OUT: 3500 mL    Total NET: -937.9 mL    Adult Advanced Hemodynamics Last 24 Hrs  CVP(mm Hg): 4 (03 May 2018 07:00) (4 - 14)  CVP(cm H2O): --  CO: 3.7 (03 May 2018 05:00) (3.4 - 3.9)  CI: 1.9 (03 May 2018 05:00) (1.7 - 2)  PA: 46/22 (03 May 2018 07:00) (32/17 - 56/33)  PA(mean): 31 (03 May 2018 07:00) (23 - 42)  PCWP: 24 (03 May 2018 05:00) (24 - 26)  SVR: 1770 (03 May 2018 05:00) (1269 - 1770)  SVRI: 3448 (03 May 2018 05:00) (0364 - 7404)  PVR: 86 (03 May 2018 05:00) (81 - 86)  PVRI: 168 (03 May 2018 05:00) (159 - 168)    General: Comfortable in bed, no acute distress  HEENT:   Normal oral mucosa, PERRL, EOMI	  Cardiovascular: Normal S1 S2, No murmurs, No edema  Respiratory: CTAB, comfortable on room air  Psychiatry: A & O x 3, Mood & affect appropriate  Gastrointestinal:  Soft, Non-tender, + BS	  Skin: No rashes, No ecchymoses, No cyanosis	  Neurologic: Non-focal  Extremities: Normal range of motion, No clubbing, cyanosis or edema. Right fem site clean, dry, intact, NVI distally  Vascular: Peripheral pulses palpable 2+ bilaterally    LABS:	 	                                   11.8   13.9  )-----------( 136      ( 03 May 2018 05:17 )             37.6       05-03    133<L>  |  97  |  32<H>  ----------------------------<  129<H>  3.9   |  25  |  1.34<H>    Ca    7.7<L>      03 May 2018 05:20  Phos  1.8     05-03  Mg     2.2     05-03    TPro  6.3  /  Alb  3.5  /  TBili  2.3<H>  /  DBili  x   /  AST  745<H>  /  ALT  1679<H>  /  AlkPhos  98  05-03      LIVER FUNCTIONS - ( 03 May 2018 05:20 )  Alb: 3.5 g/dL / Pro: 6.3 g/dL / ALK PHOS: 98 U/L / ALT: 1679 U/L / AST: 745 U/L / GGT: x             Creatine Kinase, Serum: 311 U/L (05-03-18 @ 05:20)  Troponin T, Serum: 1.94 ng/mL (05-03-18 @ 05:20)  Creatine Kinase, Serum: 510 U/L (05-02-18 @ 03:21)  Troponin T, Serum: 2.32 ng/mL (05-02-18 @ 03:21)  Creatine Kinase, Serum: 553 U/L (05-01-18 @ 22:40)  Troponin T, Serum: 2.17 ng/mL (05-01-18 @ 22:40)  Creatine Kinase, Serum: 506 U/L (05-01-18 @ 14:24)  Troponin T, Serum: 1.98 ng/mL (05-01-18 @ 14:24)      PT/INR - ( 03 May 2018 05:17 )   PT: 16.0 sec;   INR: 1.46 ratio         PTT - ( 03 May 2018 05:17 )  PTT:67.2 sec    ABG - ( 01 May 2018 20:22 )  pH, Arterial: 7.45  pH, Blood: x     /  pCO2: 29    /  pO2: 88    / HCO3: 20    / Base Excess: -2.6  /  SaO2: 97          TELEMETRY: 	Sinus tachycardia, frequent PVCs    ECG:  	Sinus tach with LBBB  RADIOLOGY:		  	< from: Xray Chest 1 View- PORTABLE-Urgent (05.01.18 @ 01:38) >  FINDINGS:     There is a cardiac conduction device overlying the left axilla with   intact leads to the heart.    Lungs: The lungs are clear.  Heart: The heart is normal in size.  Mediastinum: The mediastinum is within normal limits.    IMPRESSION:    Clear lungs.    < end of copied text >

## 2018-05-03 NOTE — PROGRESS NOTE ADULT - ATTENDING COMMENTS
Briefly, 62 y/o M with CAD s/p MI and cardiac arrests, ? anoxic brain injury with poor memory recall, PCI x 10 in past, HFrEF/NICM (biventricular dysfunction, EF 18%, severe MR, LVEDD 6.4 cm) s/p dual chamber AICD ([employer]), HLD who presented to [facility] c/o difficulty breathing and abdominal pain with wretching, ongoing for a week or so; recently ran out of insurance and had stopped taking medications for 1 month. Was admitted on 5/1 where he was volume overloaded given lasix as well as a dose of metoprolol 12.5 mg x1; went for RHC/LHC which showed significant CAD and elevated filling pressures, low CI (0.9) so IABP placed. Noted to have PAD as well. Started on lasix gtt and milrinone 0.125 which was increased to 0.375 with CI improved to approx 2.3. Diuresed well on lasix gtt with normal CVP so diuretics held. Had R on T with polymorphic VT 2/2 ectopy 5/2. Currently feels well; denies CP/SOB. On exam, JVD approx 8 cm, RRR, S3 present, clear lungs anteriorly, warm on exam; IABP in place on R c/d/i. Labs reviewed WBC 14, BUN/Cr 32/1.34 (improving; unknown baseline); AST//1619 (downtrending; previously 300s), lactate nl (improved from 10), BNP 21k, troponin T 2.3 (uptrending). ABG 7.39/28/101/17. EKG with LBBB (unknown if old). CTA done on admission which was negative for PE but showed 3.7 cm infrarenal AAA. TTE reviewed LVEDD 6.5 cm, EF 17%, severe MR, mod RV dysfunction, RVSP 42. Overall stage D HF, NYHA class IV, INTERMACS 2 with volume overload. Patient is blood type A. After multidisciplinary discussion regarding options for patient, due to peripheral arterial disease and cognitive dysfunction he is not a candidate for transplant nor LVAD which was explained to family who was receptive of this and inquired about home hospice services.   - please arrange home hospice evaluation for possible home milrinone if unable to wean off drip  - increase milrinone to 0.5 mcg/kg/min  - add captopril 6.25 mg q8h; uptitrate; goal to wean IABP tomorrow  - check labs frequently; keep K>4, Mg>2  - continue amio to suppress ectopy  - acute kidney injury resolved Attending Attestation (For Attendings USE Only)...

## 2018-05-03 NOTE — PROGRESS NOTE ADULT - ASSESSMENT
64 y/o M with CAD s/p MI and cardiac arrests, PCI x 10 in past, HFrEF/NICM (biventricular dysfunction, EF 18%, severe MR, LVIDd 6.6 cmc) s/p AICD presenting with cardiogenic shock. NYHA Class 4, INTERMACS 2. Initial RHC with RA 32, RV 70/22, PA 58/44 (44), PCW 34 with Pa sat of 22, and CI of .9. LHC with diffuse disease, 3VD at sites of previous PCI. His pressures remain marginal with rising filling pressures during the course of the day. Overall, he is NYHA Class 4, Stage D heart failure, INTERMACS 2. Not a candidate for advanced therapies.   -would increase to milrinone .5 mcg/kg/min   -continue with captopril 12.5 q 8 hours  -give 2 mg IV now to keep negative, would target a CVP of 6-8 given RV dysfunction with IVF if necessary   -BMP q 12 hours with repletion of K>4, Mg> 2  -c/w IABP at current settings given good augmentation. Plan to decrease support after vasodilator uptitration  -not a candidate for advanced mechanical therapies at this time. Agree with palliative consult   -please reach out to the HF team with any signs of deterioration as patient may need more support     Please call me at [phone] for any further questions up till 5 pm. For after hours, please call the covering cardiology [place] [phone] for any further assistance. 64 y/o M with CAD s/p MI and cardiac arrests, PCI x 10 in past, HFrEF/NICM (biventricular dysfunction, EF 18%, severe MR, LVIDd 6.6 cmc) s/p AICD presenting with cardiogenic shock. NYHA Class 4, INTERMACS 2. Initial RHC with RA 32, RV 70/22, PA 58/44 (44), PCW 34 with Pa sat of 22, and CI of .9. LHC with diffuse disease, 3VD at sites of previous PCI. His pressures remain marginal with rising filling pressures during the course of the day. Overall, he is NYHA Class 4, Stage D heart failure, INTERMACS 2. Not a candidate for advanced therapies.   -would increase to milrinone .5 mcg/kg/min   -continue with captopril 12.5 q 8 hours  -give 2 mg IV now to keep negative, would target a CVP of 6-8 given RV dysfunction with IVF if necessary   -BMP q 12 hours with repletion of K>4, Mg> 2  -c/w IABP at current settings given good augmentation. Plan to decrease support after vasodilator uptitration  -not a candidate for advanced mechanical therapies at this time. Agree with palliative consult for goals of care discussions    Please call me at [phone] for any further questions up till 5 pm. For after hours, please call the covering cardiology [place] fellow [place] [phone] for any further assistance.

## 2018-05-03 NOTE — DIETITIAN INITIAL EVALUATION ADULT. - PROBLEM SELECTOR PLAN 1
Admit to PICU  Trend cardiac enzymes  Monitor CBC, CMP, TSH. lipids  Continue Heparin ACS protocol  Continue DAPT and BB  TTE to assess LVF and wall motion  Eventual cath when kidney fucntion improves

## 2018-05-03 NOTE — DIETITIAN INITIAL EVALUATION ADULT. - PERTINENT LABORATORY DATA
(5/3)Hgb/Hct:11.8/37.6-low, Na:133-low, K:3.9, Cl:97, BUN:32-high, Cr:1.34-high, Glucose:129-high, Ca:7.7-low, Total Prot:6.3, Alb:3.5, Total Bilirubin:2/3-high, AlkPhos:98, AST:745-high, ALT:1679-high, M.2, Phos:1.8-low, ()Prealbumin:11-low, C-Reactive Protein:4.5-high, Total Iron:19-low, Unsaturated Iron Binding Capacity:325, TIBC:344, %Saturation:6-low, ()HbA1c:5.5, TSH:2.19, Cholesterol:231-high, T, HDL:21-low, LDL:195-high, Total Cholesterol/HDL Ratio:11-high.

## 2018-05-03 NOTE — PROGRESS NOTE ADULT - ASSESSMENT
64 yo man with a past history of MI, class IV NYHA CHF, undergoing work up for a possible VAD vs heart transplant. He also has a history of short term memory loss since his cardiac arrest ten years ago.    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 -  Varicella ab +    Will need hepatitis serologies given elevated LFTS, with ultrasound of abdomen  RPR  Quant gold  He will need vaccination for pneumonia, influenza (next season as this season is ending),    I understand that patient's family and CHF team has decided not to pursue VAD/transplant- will sign off  call if ID related issues arise          Mike Mcnally MD  [phone]  After 5pm/weekends [phone]

## 2018-05-04 ENCOUNTER — APPOINTMENT (OUTPATIENT)
Dept: CARDIOTHORACIC SURGERY | Facility: CLINIC | Age: 63
End: 2018-05-04

## 2018-05-04 LAB
ALBUMIN SERPL ELPH-MCNC: 3.2 G/DL — LOW (ref 3.3–5)
ALP SERPL-CCNC: 96 U/L — SIGNIFICANT CHANGE UP (ref 40–120)
ALT FLD-CCNC: 1052 U/L — HIGH (ref 10–45)
ANION GAP SERPL CALC-SCNC: 13 MMOL/L — SIGNIFICANT CHANGE UP (ref 5–17)
ANION GAP SERPL CALC-SCNC: 15 MMOL/L — SIGNIFICANT CHANGE UP (ref 5–17)
APTT BLD: 52.9 SEC — HIGH (ref 27.5–37.4)
AST SERPL-CCNC: 265 U/L — HIGH (ref 10–40)
BASE EXCESS BLDA CALC-SCNC: 2.2 MMOL/L — HIGH (ref -2–2)
BASE EXCESS BLDMV CALC-SCNC: 3.1 MMOL/L — HIGH (ref -3–3)
BASE EXCESS BLDMV CALC-SCNC: 4.3 MMOL/L — HIGH (ref -3–3)
BILIRUB SERPL-MCNC: 2.7 MG/DL — HIGH (ref 0.2–1.2)
BUN SERPL-MCNC: 18 MG/DL — SIGNIFICANT CHANGE UP (ref 7–23)
BUN SERPL-MCNC: 18 MG/DL — SIGNIFICANT CHANGE UP (ref 7–23)
CALCIUM SERPL-MCNC: 7.7 MG/DL — LOW (ref 8.4–10.5)
CALCIUM SERPL-MCNC: 8 MG/DL — LOW (ref 8.4–10.5)
CHLORIDE SERPL-SCNC: 92 MMOL/L — LOW (ref 96–108)
CHLORIDE SERPL-SCNC: 93 MMOL/L — LOW (ref 96–108)
CO2 BLDA-SCNC: 26 MMOL/L — SIGNIFICANT CHANGE UP (ref 22–30)
CO2 BLDMV-SCNC: 28 MMOL/L — SIGNIFICANT CHANGE UP (ref 21–29)
CO2 BLDMV-SCNC: 28 MMOL/L — SIGNIFICANT CHANGE UP (ref 21–29)
CO2 SERPL-SCNC: 22 MMOL/L — SIGNIFICANT CHANGE UP (ref 22–31)
CO2 SERPL-SCNC: 23 MMOL/L — SIGNIFICANT CHANGE UP (ref 22–31)
CREAT SERPL-MCNC: 1.16 MG/DL — SIGNIFICANT CHANGE UP (ref 0.5–1.3)
CREAT SERPL-MCNC: 1.2 MG/DL — SIGNIFICANT CHANGE UP (ref 0.5–1.3)
GAS PNL BLDA: SIGNIFICANT CHANGE UP
GAS PNL BLDA: SIGNIFICANT CHANGE UP
GAS PNL BLDMV: SIGNIFICANT CHANGE UP
GAS PNL BLDMV: SIGNIFICANT CHANGE UP
GLUCOSE SERPL-MCNC: 130 MG/DL — HIGH (ref 70–99)
GLUCOSE SERPL-MCNC: 134 MG/DL — HIGH (ref 70–99)
HCO3 BLDA-SCNC: 25 MMOL/L — SIGNIFICANT CHANGE UP (ref 21–29)
HCO3 BLDMV-SCNC: 26 MMOL/L — SIGNIFICANT CHANGE UP (ref 20–28)
HCO3 BLDMV-SCNC: 27 MMOL/L — SIGNIFICANT CHANGE UP (ref 20–28)
HCT VFR BLD CALC: 37.5 % — LOW (ref 39–50)
HEV AB FLD QL: POSITIVE
HGB BLD-MCNC: 12.1 G/DL — LOW (ref 13–17)
HOROWITZ INDEX BLDA+IHG-RTO: 28 — SIGNIFICANT CHANGE UP
HOROWITZ INDEX BLDMV+IHG-RTO: 21 — SIGNIFICANT CHANGE UP
HOROWITZ INDEX BLDMV+IHG-RTO: 28 — SIGNIFICANT CHANGE UP
MAGNESIUM SERPL-MCNC: 1.9 MG/DL — SIGNIFICANT CHANGE UP (ref 1.6–2.6)
MAGNESIUM SERPL-MCNC: 2.1 MG/DL — SIGNIFICANT CHANGE UP (ref 1.6–2.6)
MCHC RBC-ENTMCNC: 21.1 PG — LOW (ref 27–34)
MCHC RBC-ENTMCNC: 32.3 GM/DL — SIGNIFICANT CHANGE UP (ref 32–36)
MCV RBC AUTO: 65.4 FL — LOW (ref 80–100)
O2 CT VFR BLD CALC: 31 MMHG — SIGNIFICANT CHANGE UP (ref 30–65)
O2 CT VFR BLD CALC: 34 MMHG — SIGNIFICANT CHANGE UP (ref 30–65)
PCO2 BLDA: 32 MMHG — SIGNIFICANT CHANGE UP (ref 32–46)
PCO2 BLDMV: 36 MMHG — SIGNIFICANT CHANGE UP (ref 30–65)
PCO2 BLDMV: 37 MMHG — SIGNIFICANT CHANGE UP (ref 30–65)
PH BLDA: 7.5 — HIGH (ref 7.35–7.45)
PH BLDMV: 7.47 — HIGH (ref 7.32–7.45)
PH BLDMV: 7.5 — HIGH (ref 7.32–7.45)
PHOSPHATE SERPL-MCNC: 2.5 MG/DL — SIGNIFICANT CHANGE UP (ref 2.5–4.5)
PHOSPHATE SERPL-MCNC: 3.6 MG/DL — SIGNIFICANT CHANGE UP (ref 2.5–4.5)
PLATELET # BLD AUTO: 120 K/UL — LOW (ref 150–400)
PO2 BLDA: 116 MMHG — HIGH (ref 74–108)
POTASSIUM SERPL-MCNC: 3.9 MMOL/L — SIGNIFICANT CHANGE UP (ref 3.5–5.3)
POTASSIUM SERPL-MCNC: 4 MMOL/L — SIGNIFICANT CHANGE UP (ref 3.5–5.3)
POTASSIUM SERPL-SCNC: 3.9 MMOL/L — SIGNIFICANT CHANGE UP (ref 3.5–5.3)
POTASSIUM SERPL-SCNC: 4 MMOL/L — SIGNIFICANT CHANGE UP (ref 3.5–5.3)
PROT SERPL-MCNC: 5.7 G/DL — LOW (ref 6–8.3)
RBC # BLD: 5.73 M/UL — SIGNIFICANT CHANGE UP (ref 4.2–5.8)
RBC # FLD: 14.7 % — HIGH (ref 10.3–14.5)
SAO2 % BLDA: 99 % — HIGH (ref 92–96)
SAO2 % BLDMV: 57 % — LOW (ref 60–90)
SAO2 % BLDMV: 62 % — SIGNIFICANT CHANGE UP (ref 60–90)
SODIUM SERPL-SCNC: 129 MMOL/L — LOW (ref 135–145)
SODIUM SERPL-SCNC: 129 MMOL/L — LOW (ref 135–145)
WBC # BLD: 15.5 K/UL — HIGH (ref 3.8–10.5)
WBC # FLD AUTO: 15.5 K/UL — HIGH (ref 3.8–10.5)

## 2018-05-04 PROCEDURE — 71045 X-RAY EXAM CHEST 1 VIEW: CPT | Mod: 26

## 2018-05-04 PROCEDURE — 93010 ELECTROCARDIOGRAM REPORT: CPT

## 2018-05-04 PROCEDURE — 99233 SBSQ HOSP IP/OBS HIGH 50: CPT | Mod: GC

## 2018-05-04 PROCEDURE — 99291 CRITICAL CARE FIRST HOUR: CPT

## 2018-05-04 RX ORDER — POTASSIUM CHLORIDE 20 MEQ
20 PACKET (EA) ORAL ONCE
Qty: 0 | Refills: 0 | Status: COMPLETED | OUTPATIENT
Start: 2018-05-04 | End: 2018-05-04

## 2018-05-04 RX ORDER — POTASSIUM CHLORIDE 20 MEQ
40 PACKET (EA) ORAL EVERY 4 HOURS
Qty: 0 | Refills: 0 | Status: DISCONTINUED | OUTPATIENT
Start: 2018-05-04 | End: 2018-05-04

## 2018-05-04 RX ORDER — POTASSIUM CHLORIDE 20 MEQ
10 PACKET (EA) ORAL ONCE
Qty: 0 | Refills: 0 | Status: COMPLETED | OUTPATIENT
Start: 2018-05-04 | End: 2018-05-04

## 2018-05-04 RX ORDER — ATORVASTATIN CALCIUM 80 MG/1
40 TABLET, FILM COATED ORAL AT BEDTIME
Qty: 0 | Refills: 0 | Status: DISCONTINUED | OUTPATIENT
Start: 2018-05-04 | End: 2018-05-04

## 2018-05-04 RX ORDER — BUMETANIDE 0.25 MG/ML
2 INJECTION INTRAMUSCULAR; INTRAVENOUS ONCE
Qty: 0 | Refills: 0 | Status: COMPLETED | OUTPATIENT
Start: 2018-05-04 | End: 2018-05-04

## 2018-05-04 RX ADMIN — BUMETANIDE 116 MILLIGRAM(S): 0.25 INJECTION INTRAMUSCULAR; INTRAVENOUS at 08:13

## 2018-05-04 RX ADMIN — Medication 100 MILLIGRAM(S): at 12:24

## 2018-05-04 RX ADMIN — CAPTOPRIL 12.5 MILLIGRAM(S): 12.5 TABLET ORAL at 08:13

## 2018-05-04 RX ADMIN — AMIODARONE HYDROCHLORIDE 400 MILLIGRAM(S): 400 TABLET ORAL at 18:22

## 2018-05-04 RX ADMIN — TICAGRELOR 90 MILLIGRAM(S): 90 TABLET ORAL at 05:05

## 2018-05-04 RX ADMIN — HEPARIN SODIUM 1200 UNIT(S)/HR: 5000 INJECTION INTRAVENOUS; SUBCUTANEOUS at 04:46

## 2018-05-04 RX ADMIN — MILRINONE LACTATE 13.04 MICROGRAM(S)/KG/MIN: 1 INJECTION, SOLUTION INTRAVENOUS at 04:46

## 2018-05-04 RX ADMIN — SENNA PLUS 2 TABLET(S): 8.6 TABLET ORAL at 21:43

## 2018-05-04 RX ADMIN — Medication 62.5 MILLIMOLE(S): at 00:08

## 2018-05-04 RX ADMIN — Medication 100 MILLIEQUIVALENT(S): at 05:17

## 2018-05-04 RX ADMIN — Medication 81 MILLIGRAM(S): at 12:24

## 2018-05-04 RX ADMIN — MILRINONE LACTATE 13.04 MICROGRAM(S)/KG/MIN: 1 INJECTION, SOLUTION INTRAVENOUS at 14:41

## 2018-05-04 RX ADMIN — Medication 100 MILLIEQUIVALENT(S): at 14:35

## 2018-05-04 RX ADMIN — CAPTOPRIL 12.5 MILLIGRAM(S): 12.5 TABLET ORAL at 14:34

## 2018-05-04 NOTE — PROGRESS NOTE ADULT - ASSESSMENT
62 y/o M with CAD s/p MI and cardiac arrests, PCI x 10 in past, HFrEF/NICM (biventricular dysfunction, EF 18%, severe MR, LVIDd 6.6 cmc) s/p AICD presenting with cardiogenic shock. NYHA Class 4, INTERMACS 2. Initial RHC with RA 32, RV 70/22, PA 58/44 (44), PCW 34 with Pa sat of 22, and CI of .9. LHC with diffuse disease, 3VD at sites of previous PCI.  Overall, he is NYHA Class 4, Stage D heart failure, INTERMACS 2. Not a candidate for advanced therapies. Now DNR/DNI.   -c/w milrinone .5 mcg/kg/min   -given 2 mg bumex this morning. Hold rest of today and start on 20 mg oral furosemide tomorrow to keep I=O given CVP 5-6 currently  -plan to remove IABP  -continue with captopril 12.5 q 8 hours, if hemodynamics stable after IABP, would uptitrate   -BMP q 12 hours with repletion of K>4, Mg> 2  -not a candidate for advanced mechanical therapies at this time. Agree with palliative consult for goals of care discussions    Please call me at [phone] for any further questions up till 5 pm. For after hours, please call the covering cardiology [place] fellow at [phone] for any further assistance.

## 2018-05-04 NOTE — PROGRESS NOTE ADULT - ASSESSMENT
64 y/o M with CAD s/p MI and cardiac arrests, remote stents x 10 (2005), HFrEF s/p AICD ([employer]) with battery change 3 years ago, AAA, gastic ulcers, HLD, and BPH admitted for NSTEMI c/b acute on chronic decompensated systolic heart failure, requiring CCU for acute pulmonary edema    Neuro:  - Awake, alert, no issues.     CV:  Acute on Chronic Systolic Heart Failure  - EF approx 17% w/ severe LV dysfunction and MR  - Keep CVP~ 5-6  - Wean IABP to 1:2  - Continue Milrinone gtt  - Captopril 12.5 TID  NSTEMI  - S/p catheterization revealing triple vessel disease  - DAPT  - Continue Heparin  Non-sustained Ventricular Tachycardia  - Amiodarone infusion  - Add lidocaine for recurrent VT    Pulm:  Acute Pulmonary Edema  - Improved after diuresis  - Monitor off BiPAP  - Oxygen PRN to maintain SpO2 >92%    GI:  - DASH diet  - Significant transaminitis likely from shock liver, improving    :   Acute Kidney Injury  - Creatinine 2.24 on presentation, improving  - Continue to trend BUN/Cr  - Keep net negative.   - Maintain strict Ins and Outs  - Aggressively replete electrolytes    Heme:  - Trend CBC  - INR normalizing  - DAPT, heparin gtt    Endo:  - A1c, TSH normal  - Monitor glucose on labs    ID:  - Afebrile, no signs of infection  - Monitor for fevers, leukocytosis.    Dispo; CCU 62 y/o M with CAD s/p MI and cardiac arrests, remote stents x 10 (2005), HFrEF s/p AICD ([employer]) with battery change 3 years ago, AAA, gastic ulcers, HLD, and BPH admitted for NSTEMI c/b acute on chronic decompensated systolic heart failure, requiring CCU for acute pulmonary edema    Neuro:  - Awake, alert, no issues.     CV:  Acute on Chronic Systolic Heart Failure  - EF approx 17% w/ severe LV dysfunction and MR  - Keep CVP~ 5-6  - Wean IABP to 1:2  - Continue Milrinone gtt  - Captopril 12.5 TID  NSTEMI  - S/p catheterization revealing triple vessel disease  - DAPT  - Continue Heparin  Non-sustained Ventricular Tachycardia  - Amiodarone infusion  - Add lidocaine for recurrent VT    Pulm:  Acute Pulmonary Edema  - Improved after diuresis  - Monitor off BiPAP  - Oxygen PRN to maintain SpO2 >92%    GI:  - DASH diet  - Significant transaminitis likely from shock liver, improving    :   Acute Kidney Injury  - Creatinine 2.24 on presentation, improving  - Continue to trend BUN/Cr  - Keep net negative.   - Maintain strict Ins and Outs  - Aggressively replete electrolytes  - Sodium down trending, continue to monitor    Heme:  - Trend CBC  - INR normalizing  - DAPT, heparin gtt    Endo:  - A1c, TSH normal  - Monitor glucose on labs    ID:  - Afebrile, no signs of infection  - Monitor for fevers, leukocytosis.    Dispo; CCU 62 y/o M with CAD s/p MI and cardiac arrests, remote stents x 10 (2005), HFrEF s/p AICD ([employer]) with battery change 3 years ago, AAA, gastic ulcers, HLD, and BPH admitted for NSTEMI c/b acute on chronic decompensated systolic heart failure, requiring CCU for acute pulmonary edema    Neuro:  - Awake, alert, no issues.     CV:  Acute on Chronic Systolic Heart Failure  - EF approx 17% w/ severe LV dysfunction and MR  - Keep CVP~ 5-6  - Wean IABP to 1:2, plan to dc today  - Continue Milrinone gtt  - Captopril 12.5 TID, transition to once daily lisinopril tomorrow  NSTEMI  - S/p catheterization revealing triple vessel disease  - DC brilinta, statin  - Continue ASA  Non-sustained Ventricular Tachycardia  - Amiodarone PO for 10g load    Pulm:  Acute Pulmonary Edema  - Oxygen PRN to maintain SpO2 >92%    GI:  - DASH diet  - Significant transaminitis likely from shock liver, improving    :   Acute Kidney Injury  - Creatinine 2.24 on presentation, improving  - Continue to trend BUN/Cr  - Keep net negative.   - Maintain strict Ins and Outs  - Aggressively replete electrolytes  - Sodium down trending, continue to monitor    Heme:  - Trend CBC  - INR normalizing  - dc heparin gtt for IABP removal    Endo:  - A1c, TSH normal  - Monitor glucose on labs    ID:  - Afebrile, no signs of infection  - Monitor for fevers    Dispo; CCU

## 2018-05-04 NOTE — PROGRESS NOTE ADULT - ATTENDING COMMENTS
Briefly, 62 y/o M with CAD s/p MI and cardiac arrests, ? anoxic brain injury with poor memory recall, PCI x 10 in past, HFrEF/NICM (biventricular dysfunction, EF 18%, severe MR, LVEDD 6.4 cm) s/p dual chamber AICD ([employer]), HLD who presented to [facility] c/o difficulty breathing and abdominal pain with wretching, ongoing for a week or so; recently ran out of insurance and had stopped taking medications for 1 month. Was admitted on 5/1 where he was volume overloaded given lasix as well as a dose of metoprolol 12.5 mg x1; went for RHC/LHC which showed significant CAD and elevated filling pressures, low CI (0.9) so IABP placed. Noted to have PAD as well. Started on lasix gtt and milrinone 0.125 which was increased to 0.375 with CI improved to approx 2.3. Diuresed well on lasix gtt with normal CVP so diuretics held. Had R on T with polymorphic VT 2/2 ectopy 5/2. Currently feels well; denies CP/SOB. Was deemed too high risk for LVAD/transplant given PAD and cognitive deficits. Family leaned towards hospice. On exam, JVD approx 8 cm, RRR, clear lungs anteriorly, warm on exam; IABP in place on R c/d/i. Labs reviewed WBC 15, BUN/Cr 18/1.2 (resolved); AST/ALT improving, lactate nl (improved from 10), BNP 21k, troponin T 2.3. ABG 7.39/28/101/17. EKG with LBBB (unknown if old). CTA done on admission which was negative for PE but showed 3.7 cm infrarenal AAA. TTE reviewed LVEDD 6.5 cm, EF 17%, severe MR, mod RV dysfunction, RVSP 42. Overall stage D HF, NYHA class IV, INTERMACS 2 with volume overload. Plan for home hospice on milrinone 0.5 mcg/kg/min.  - please arrange home hospice evaluation for possible home milrinone  - will need PICC line  - continue captopril 12.5 mg q8h  - continue amio to suppress ectopy; can switch to PO  - would d/c IABP given goals of care Briefly, 64 y/o M with CAD s/p MI and cardiac arrests, ? anoxic brain injury with poor memory recall, PCI x 10 in past, HFrEF/NICM (biventricular dysfunction, EF 18%, severe MR, LVEDD 6.4 cm) s/p dual chamber AICD ([employer]), HLD who presented to [facility] c/o difficulty breathing and abdominal pain with wretching, ongoing for a week or so; recently ran out of insurance and had stopped taking medications for 1 month. Was admitted on 5/1 where he was volume overloaded given lasix as well as a dose of metoprolol 12.5 mg x1; went for RHC/LHC which showed significant CAD and elevated filling pressures, low CI (0.9) so IABP placed. Noted to have PAD as well. Started on lasix gtt and milrinone 0.125 which was increased to 0.375 with CI improved to approx 2.3. Diuresed well on lasix gtt with normal CVP so diuretics held. Had R on T with polymorphic VT 2/2 ectopy 5/2. Currently feels well; denies CP/SOB. Was deemed too high risk for LVAD/transplant given PAD and cognitive deficits. Family leaned towards hospice. On exam, JVD approx 8 cm, RRR, clear lungs anteriorly, warm on exam; IABP in place on R c/d/i. Labs reviewed WBC 15, BUN/Cr 18/1.2 (resolved); AST/ALT improving, lactate nl (improved from 10), BNP 21k, troponin T 2.3. ABG 7.39/28/101/17. EKG with LBBB (unknown if old). CTA done on admission which was negative for PE but showed 3.7 cm infrarenal AAA. TTE reviewed LVEDD 6.5 cm, EF 17%, severe MR, mod RV dysfunction, RVSP 42. Overall stage D HF, NYHA class IV, INTERMACS 2 with volume overload. Plan for home hospice on milrinone 0.5 mcg/kg/min.  - please arrange home hospice evaluation for possible home milrinone  - will need PICC line  - continue captopril 12.5 mg q8h  - continue amio to suppress ectopy; can switch to PO  - start lasix 20 mg PO daily  - would d/c IABP given goals of care

## 2018-05-04 NOTE — CHART NOTE - NSCHARTNOTEFT_GEN_A_CORE
Had a conversation with patient's family, specifically his wife, regarding hospice. Given patient is not a candidate for advanced therapies, family wants him to go home with hospice. Spoke to them and they would like the patient to be DNR/DNI and would like to focus on his comfort. They understand risks and benefits of this decision. His defibrillator function on his ICD was shut off as per family's request as well.

## 2018-05-04 NOTE — CHART NOTE - NSCHARTNOTEFT_GEN_A_CORE
====================  CCU MIDNIGHT ROUNDS  ====================    SHANIQUA MCKENZIE  [id number]    ====================  SUMMARY: HPI:  This is a 62 y/o M with CAD s/p MI and cardiac arrests, remote stents x 10 (2005), HFrEF s/p AICD ([employer]) with battery change 3 years ago, AAA, gastic ulcers, HLD, and BPH presented to [facility] c/o difficulty breathing and abdominal pain with wretching which they attributed to constipation.  Per patient’s spouse, he has been intermittent non-radiating chest pain x 1 month but refused to come to the ED.  Most of the info is obtained from patient’s spouse.  He has been chronically c/o dizziness and left arm pain since ICD implantation.  Patient has been off his medications (ASA, Plavix, and Lipitor) x 1 month and Entresto 8 months ago due to lack of insurance.  Denies CP, DAVIS, orthopnea, weight gain, fever, sick contacts, or recent travels.  Of note, patient has had difficulty remembering events since his cardiac arrest in 2005, according to his neurologist due to anoxia per spouse.  In OSH ED, EKG showed LBBB and elevated cardiac enzymes (VXY=865, CK/MB=20.7, Trop I=27.5).  Transferred to [facility] for concern of STEMI.  CTA of the chest, abdomen, and pelvis done showing no evidence of aortic dissection; 3.7 cm infrarenal abdominal aortic aneurysm with partial thrombotic plaque.  No central pulmonary embolism.  Small bilateral pleural effusions.  CT head showed no intracranial hemorrhage, mass effect or large acute cortical infarct.    Upon arrival to PICU, patient is pain-free, denies difficulty breathing but c/o back pain, attributing it to the stretcher. (01 May 2018 05:16)    ====================        ====================  NEW EVENTS:  ====================        ====================  VITALS (Last 12 hrs):  ====================    T(C): 37 (05-03-18 @ 23:00), Max: 37.4 (05-03-18 @ 16:00)  HR: 96 (05-04-18 @ 00:00) (90 - 100)  BP: --  BP(mean): --  ABP: 104/54 (05-04-18 @ 00:00) (86/46 - 114/76)  ABP(mean): 74 (05-04-18 @ 00:00) (62 - 106)  RR: 20 (05-04-18 @ 00:00) (11 - 25)  SpO2: 97% (05-04-18 @ 00:00) (95% - 99%)  Wt(kg): --  CVP(mm Hg): 10 (05-04-18 @ 00:00) (3 - 11)  CO: 3.7 (05-03-18 @ 16:00) (3.7 - 3.7)  CI: 1.8 (05-03-18 @ 16:00) (1.8 - 1.8)  PA:  (41/18 - 56/28)  PA(mean): 32 (05-04-18 @ 00:00) (26 - 38)  PA(direct): --  PCWP: 14 (05-03-18 @ 16:00)  SVR: 2224 (05-03-18 @ 16:00) (2224 - 2224)    TELEMETRY:        *BLOOD GAS/ARTERIAL/MIXED/VENOUS  *LACTATE    I&O's Summary    02 May 2018 07:01  -  03 May 2018 07:00  --------------------------------------------------------  IN: 2600.6 mL / OUT: 3530 mL / NET: -929.4 mL    03 May 2018 07:01  -  04 May 2018 00:31  --------------------------------------------------------  IN: 1340.6 mL / OUT: 2225 mL / NET: -884.4 mL        ====================  PLAN:  ====================    HEALTH ISSUES - PROBLEM Dx:  Systolic CHF, acute on chronic: Systolic CHF, acute on chronic  NSTEMI (non-ST elevated myocardial infarction): NSTEMI (non-ST elevated myocardial infarction)  JANENE (acute kidney injury): JANENE (acute kidney injury)  Need for prophylactic measure: Need for prophylactic measure  Hyperlipidemia, unspecified hyperlipidemia type: Hyperlipidemia, unspecified hyperlipidemia type  Chronic systolic heart failure: Chronic systolic heart failure  Coronary artery disease involving native coronary artery of native heart without angina pectoris: Coronary artery disease involving native coronary artery of native heart without angina pectoris        HEALTH ISSUES - R/O PROBLEM Dx:      Brandin Munguia MD: [phone] ====================  CCU MIDNIGHT ROUNDS  ====================    SHANIQUA MCKENZIE  [id number]    ====================  SUMMARY: HPI:  This is a 62 y/o M with CAD s/p MI and cardiac arrests, remote stents x 10 (2005), HFrEF s/p AICD ([employer]) with battery change 3 years ago, AAA, gastic ulcers, HLD, and BPH presented to [facility] c/o difficulty breathing and abdominal pain with wretching which they attributed to constipation.  Per patient’s spouse, he has been intermittent non-radiating chest pain x 1 month but refused to come to the ED.  Most of the info is obtained from patient’s spouse.  He has been chronically c/o dizziness and left arm pain since ICD implantation.  Patient has been off his medications (ASA, Plavix, and Lipitor) x 1 month and Entresto 8 months ago due to lack of insurance.  Denies CP, DAVIS, orthopnea, weight gain, fever, sick contacts, or recent travels.  Of note, patient has had difficulty remembering events since his cardiac arrest in 2005, according to his neurologist due to anoxia per spouse.  In OSH ED, EKG showed LBBB and elevated cardiac enzymes (HZB=106, CK/MB=20.7, Trop I=27.5).  Transferred to [facility] for concern of STEMI.  CTA of the chest, abdomen, and pelvis done showing no evidence of aortic dissection; 3.7 cm infrarenal abdominal aortic aneurysm with partial thrombotic plaque.  No central pulmonary embolism.  Small bilateral pleural effusions.  CT head showed no intracranial hemorrhage, mass effect or large acute cortical infarct.    Upon arrival to PICU, patient is pain-free, denies difficulty breathing but c/o back pain, attributing it to the stretcher. (01 May 2018 05:16)    ====================        ====================  NEW EVENTS:  ====================        ====================  VITALS (Last 12 hrs):  ====================    T(C): 37 (05-03-18 @ 23:00), Max: 37.4 (05-03-18 @ 16:00)  HR: 96 (05-04-18 @ 00:00) (90 - 100)  BP: --  BP(mean): --  ABP: 104/54 (05-04-18 @ 00:00) (86/46 - 114/76)  ABP(mean): 74 (05-04-18 @ 00:00) (62 - 106)  RR: 20 (05-04-18 @ 00:00) (11 - 25)  SpO2: 97% (05-04-18 @ 00:00) (95% - 99%)  Wt(kg): --  CVP(mm Hg): 10 (05-04-18 @ 00:00) (3 - 11)  CO: 3.7 (05-03-18 @ 16:00) (3.7 - 3.7)  CI: 1.8 (05-03-18 @ 16:00) (1.8 - 1.8)  PA:  (41/18 - 56/28)  PA(mean): 32 (05-04-18 @ 00:00) (26 - 38)  PA(direct): --  PCWP: 14 (05-03-18 @ 16:00)  SVR: 2224 (05-03-18 @ 16:00) (2224 - 2224)    TELEMETRY:        *BLOOD GAS/ARTERIAL/MIXED/VENOUS  *LACTATE    I&O's Summary    02 May 2018 07:01  -  03 May 2018 07:00  --------------------------------------------------------  IN: 2600.6 mL / OUT: 3530 mL / NET: -929.4 mL    03 May 2018 07:01  -  04 May 2018 00:31  --------------------------------------------------------  IN: 1340.6 mL / OUT: 2225 mL / NET: -884.4 mL        ====================  PLAN:    62 y/o M with CAD s/p MI and cardiac arrests, PCI x 10 in past, HFrEF (biventricular dysfunction, EF 18%, severe MR) s/p AICD, AAA, presenting with cardiogenic shock c/b multiorgan failure, pulmonary edema requiring BIPAP, found to have triple vessel disease, currently with swan and IABP on milrinone drip.     PLAN  1. Cardiogenic shock and low output HFrEF with swan and IABP  - No longer a candidate for LVAD  - Now DNR/DNI, ICD turned off based on palliative care meeting  - Continue milrinone at 0.5   - Trend hemodynamics, CO 6, CI 3, mvO2 68, SVR 800s, CVP 10   - Monitor urine output, negative 800cc today  - Follow up advanced HF  2. NSVT: continue amiodarone load to 10g, monitor and replete electrolytes K>4, Mg>2  3. NSTEMI, Triple Vessel CAD s/p stents  - Continue aspirin 81 mg, brillinta 90 mg BID  4. Transaminitis: currently improving, trend CMP and PT/INR, avoid hepatotoxic agents, dose medications based on hepatic function,  - Restarting atorvastatin 40 mg   ====================    HEALTH ISSUES - PROBLEM Dx:  Systolic CHF, acute on chronic: Systolic CHF, acute on chronic  NSTEMI (non-ST elevated myocardial infarction): NSTEMI (non-ST elevated myocardial infarction)  JANENE (acute kidney injury): JANENE (acute kidney injury)  Need for prophylactic measure: Need for prophylactic measure  Hyperlipidemia, unspecified hyperlipidemia type: Hyperlipidemia, unspecified hyperlipidemia type  Chronic systolic heart failure: Chronic systolic heart failure  Coronary artery disease involving native coronary artery of native heart without angina pectoris: Coronary artery disease involving native coronary artery of native heart without angina pectoris        HEALTH ISSUES - R/O PROBLEM Dx:      Brandin Munguia MD: [phone]

## 2018-05-04 NOTE — PROGRESS NOTE ADULT - ATTENDING COMMENTS
Patient is seen and examined with fellow, NP and the CCU house-staff. I agree with the history, physical and the assessment and plan.  will plan to change the IABP to 1:3 with plan of removing the IABP  patient is DNR  ICD is off  tolerating captopril

## 2018-05-04 NOTE — PROGRESS NOTE ADULT - PROBLEM SELECTOR PROBLEM 2
Systolic CHF, acute on chronic

## 2018-05-04 NOTE — PROGRESS NOTE ADULT - SUBJECTIVE AND OBJECTIVE BOX
24H hour events: No acute events overnight.     MEDICATIONS:  amiodarone    Tablet 400 milliGRAM(s) Oral two times a day  amiodarone    Tablet 200 milliGRAM(s) Oral daily  aspirin  chewable 81 milliGRAM(s) Oral daily  captopril 12.5 milliGRAM(s) Oral three times a day  milrinone Infusion 0.5 MICROgram(s)/kG/Min IV Continuous <Continuous>  aluminum hydroxide/magnesium hydroxide/simethicone Suspension 30 milliLiter(s) Oral every 6 hours PRN  docusate sodium 100 milliGRAM(s) Oral daily  senna 2 Tablet(s) Oral at bedtime  influenza   Vaccine 0.5 milliLiter(s) IntraMuscular once    REVIEW OF SYSTEMS:  Complete 10point ROS negative except as documented above.    PHYSICAL EXAM:  T(C): 37 (05-04-18 @ 08:00), Max: 37.3 (05-03-18 @ 20:00)  HR: 96 (05-04-18 @ 15:00) (90 - 100)  BP: 84/53 (05-04-18 @ 14:12) (84/53 - 84/53)  RR: 19 (05-04-18 @ 15:00) (15 - 26)  SpO2: 95% (05-04-18 @ 15:00) (95% - 99%)  Wt(kg): --  I&O's Summary    03 May 2018 07:01  -  04 May 2018 07:00  --------------------------------------------------------  IN: 1703.7 mL / OUT: 2740 mL / NET: -1036.3 mL    04 May 2018 07:01  -  04 May 2018 16:10  --------------------------------------------------------  IN: 290 mL / OUT: 1135 mL / NET: -845 mL    Appearance: Normal	  HEENT:   Normal oral mucosa, PERRL, EOMI	  Lymphatic: No lymphadenopathy  Cardiovascular: Normal S1 S2, No JVD, No murmurs, No edema  Respiratory: Lungs clear to auscultation	  Psychiatry: A & O x 3, Mood & affect appropriate  Gastrointestinal:  Soft, Non-tender, + BS	  Skin: No rashes, No ecchymoses, No cyanosis	  Neurologic: Non-focal  Extremities: Normal range of motion, No clubbing, cyanosis or edema  Vascular: Peripheral pulses palpable 2+ bilaterally    LABS:	 	    CBC Full  -  ( 04 May 2018 04:17 )  WBC Count : 15.5 K/uL  Hemoglobin : 12.1 g/dL  Hematocrit : 37.5 %  Platelet Count - Automated : 120 K/uL  Mean Cell Volume : 65.4 fl  Mean Cell Hemoglobin : 21.1 pg  Mean Cell Hemoglobin Concentration : 32.3 gm/dL  Auto Neutrophil # : x  Auto Lymphocyte # : x  Auto Monocyte # : x  Auto Eosinophil # : x  Auto Basophil # : x  Auto Neutrophil % : x  Auto Lymphocyte % : x  Auto Monocyte % : x  Auto Eosinophil % : x  Auto Basophil % : x    05-04    129<L>  |  92<L>  |  18  ----------------------------<  134<H>  4.0   |  22  |  1.16  05-04    129<L>  |  93<L>  |  18  ----------------------------<  130<H>  3.9   |  23  |  1.20    Ca    8.0<L>      04 May 2018 14:09  Ca    7.7<L>      04 May 2018 04:16  Phos  2.5     05-04  Phos  3.6     05-04  Mg     1.9     05-04  Mg     2.1     05-04    TPro  5.7<L>  /  Alb  3.2<L>  /  TBili  2.7<H>  /  DBili  x   /  AST  265<H>  /  ALT  1052<H>  /  AlkPhos  96  05-04  TPro  6.1  /  Alb  3.4  /  TBili  2.5<H>  /  DBili  x   /  AST  434<H>  /  ALT  1308<H>  /  AlkPhos  99  05-03    proBNP: Serum Pro-Brain Natriuretic Peptide: 19007 pg/mL (05-01 @ 06:35)  	  ASSESSMENT/PLAN:

## 2018-05-04 NOTE — PROGRESS NOTE ADULT - SUBJECTIVE AND OBJECTIVE BOX
CCU Daily Progress Note    Patient is a 63y old  Male who presents with a chief complaint of chest pain and SOB    Interval Events: Pt not a candidate for advanced therapies and will be pursuing palliative care options. Milrinone increased and adding captopril to attempt to wean IABP.     HPI:  This is a 62 y/o M with CAD s/p MI and cardiac arrests, remote stents x 10 (2005), HFrEF s/p AICD ([employer]) with battery change 3 years ago, AAA, gastic ulcers, HLD, and BPH presented to [facility] c/o difficulty breathing and abdominal pain with wretching which they attributed to constipation.  Per patient’s spouse, he has been intermittent non-radiating chest pain x 1 month but refused to come to the ED.  Most of the info is obtained from patient’s spouse.  He has been chronically c/o dizziness and left arm pain since ICD implantation.  Patient has been off his medications (ASA, Plavix, and Lipitor) x 1 month and Entresto 8 months ago due to lack of insurance.  Denies CP, DAVIS, orthopnea, weight gain, fever, sick contacts, or recent travels.  Of note, patient has had difficulty remembering events since his cardiac arrest in 2005, according to his neurologist due to anoxia per spouse.  In OSH ED, EKG showed LBBB and elevated cardiac enzymes (XWV=478, CK/MB=20.7, Trop I=27.5).  Transferred to [facility] for concern of STEMI.  CTA of the chest, abdomen, and pelvis done showing no evidence of aortic dissection; 3.7 cm infrarenal abdominal aortic aneurysm with partial thrombotic plaque.  No central pulmonary embolism.  Small bilateral pleural effusions.  CT head showed no intracranial hemorrhage, mass effect or large acute cortical infarct.    Upon arrival to PICU, patient was pain-free, denies difficulty breathing but c/o back pain, attributing it to the stretcher. Loaded with ASA, Plavix, started on lopressor 12.5mg 2xd. Decompensated 5/1 AM requiring Lasix 80mg IVP and BIPAP support, transferred to CCU.     REVIEW OF SYSTEMS: Negative  	  MEDICATIONS  (STANDING):  amiodarone    Tablet 400 milliGRAM(s) Oral two times a day  amiodarone    Tablet 200 milliGRAM(s) Oral daily  aspirin  chewable 81 milliGRAM(s) Oral daily  atorvastatin 40 milliGRAM(s) Oral at bedtime  buMETAnide IVPB 2 milliGRAM(s) IV Intermittent once  captopril 12.5 milliGRAM(s) Oral three times a day  docusate sodium 100 milliGRAM(s) Oral daily  heparin  Infusion.  Unit(s)/Hr (10 mL/Hr) IV Continuous <Continuous>  influenza   Vaccine 0.5 milliLiter(s) IntraMuscular once  milrinone Infusion 0.5 MICROgram(s)/kG/Min (13.035 mL/Hr) IV Continuous <Continuous>  senna 2 Tablet(s) Oral at bedtime  ticagrelor 90 milliGRAM(s) Oral two times a day    MEDICATIONS  (PRN):  aluminum hydroxide/magnesium hydroxide/simethicone Suspension 30 milliLiter(s) Oral every 6 hours PRN Dyspepsia  heparin  Injectable 5300 Unit(s) IV Push every 6 hours PRN For aPTT less than 40    PHYSICAL EXAM:  T(C): 36.8 (05-04-18 @ 03:00), Max: 37.4 (05-03-18 @ 16:00)  HR: 92 (05-04-18 @ 07:00) (88 - 100)  BP: --  BP(mean): --  ABP: 164/148 (05-04-18 @ 07:00) (84/60 - 164/148)  ABP(mean): 196 (05-04-18 @ 07:00) (62 - 196)  RR: 19 (05-04-18 @ 07:00) (11 - 26)  SpO2: 96% (05-04-18 @ 06:00) (95% - 99%)  Wt(kg): --  CVP(mm Hg): 9 (05-04-18 @ 07:00) (2 - 12)  CI: 1.8 (05-03-18 @ 16:00) (1.8 - 2)  CAPILLARY BLOOD GLUCOSE       N/A      05-03 @ 07:01  -  05-04 @ 07:00  --------------------------------------------------------  IN:    amiodarone Infusion: 116.9 mL    heparin  Infusion.: 276 mL    IV PiggyBack: 537.5 mL    milrinone  Infusion: 170.3 mL    milrinone  Infusion: 98 mL    Oral Fluid: 480 mL  Total IN: 1678.7 mL    OUT:    Indwelling Catheter - Urethral: 2665 mL  Total OUT: 2665 mL    Total NET: -986.3 mL    Adult Advanced Hemodynamics Last 24 Hrs  CVP(mm Hg): 9 (04 May 2018 07:00) (2 - 12)  CVP(cm H2O): --  CO: 3.7 (03 May 2018 16:00) (3.7 - 4.1)  CI: 1.8 (03 May 2018 16:00) (1.8 - 2)  PA: 51/23 (04 May 2018 07:00) (41/18 - 57/31)  PA(mean): 32 (04 May 2018 07:00) (26 - 38)  PCWP: 14 (03 May 2018 16:00) (14 - 14)  SVR: 2224 (03 May 2018 16:00) (1500 - 2224)  SVRI: 4572 (03 May 2018 16:00) (3076 - 4572)  PVR: 388 (03 May 2018 16:00) (388 - 388)  PVRI: 799 (03 May 2018 16:00) (799 - 799)    General: Comfortable in bed, no acute distress  HEENT:   Normal oral mucosa, PERRL, EOMI	  Cardiovascular: Normal S1 S2, No murmurs, No edema  Respiratory: CTAB, comfortable on room air  Psychiatry: A & O x 3, Mood & affect appropriate  Gastrointestinal:  Soft, Non-tender, + BS	  Skin: No rashes, No ecchymoses, No cyanosis	  Neurologic: Non-focal  Extremities: Normal range of motion, No clubbing, cyanosis or edema. Right fem site clean, dry, intact, NVI distally  Vascular: Peripheral pulses palpable 2+ bilaterally    LABS:	 	                        12.1   15.5  )-----------( 120      ( 04 May 2018 04:17 )             37.5       05-04    129<L>  |  93<L>  |  18  ----------------------------<  130<H>  3.9   |  23  |  1.20    Ca    7.7<L>      04 May 2018 04:16  Phos  3.6     05-04  Mg     2.1     05-04    TPro  5.7<L>  /  Alb  3.2<L>  /  TBili  2.7<H>  /  DBili  x   /  AST  265<H>  /  ALT  1052<H>  /  AlkPhos  96  05-04      LIVER FUNCTIONS - ( 04 May 2018 04:16 )  Alb: 3.2 g/dL / Pro: 5.7 g/dL / ALK PHOS: 96 U/L / ALT: 1052 U/L / AST: 265 U/L / GGT: x             Creatine Kinase, Serum: 311 U/L (05-03-18 @ 05:20)  Troponin T, Serum: 1.94 ng/mL (05-03-18 @ 05:20)      PT/INR - ( 03 May 2018 05:17 )   PT: 16.0 sec;   INR: 1.46 ratio         PTT - ( 04 May 2018 04:17 )  PTT:52.9 sec    ABG - ( 04 May 2018 04:09 )  pH, Arterial: 7.50  pH, Blood: x     /  pCO2: 32    /  pO2: 116   / HCO3: 25    / Base Excess: 2.2   /  SaO2: 99          TELEMETRY: 	Sinus tachycardia, frequent PVCs    ECG:  	Sinus tach with LBBB  RADIOLOGY:		  	< from: Xray Chest 1 View- PORTABLE-Urgent (05.01.18 @ 01:38) >  FINDINGS:     There is a cardiac conduction device overlying the left axilla with   intact leads to the heart.    Lungs: The lungs are clear.  Heart: The heart is normal in size.  Mediastinum: The mediastinum is within normal limits.    IMPRESSION:    Clear lungs.    < end of copied text >

## 2018-05-04 NOTE — CHART NOTE - NSCHARTNOTEFT_GEN_A_CORE
====================  CCU MIDNIGHT ROUNDS  ====================    SHANIQUA MCKENZIE  [id number]  Patient is a 63y old  Male who presents with a chief complaint of     ====================  SUMMARY:  ====================  This is a 62 y/o M with CAD s/p MI and cardiac arrests, remote stents x 10 (2005), HFrEF s/p AICD ([employer]) with battery change 3 years ago, AAA, gastic ulcers, HLD, and BPH presented to [facility] c/o difficulty breathing and abdominal pain with wretching which they attributed to constipation.  Per patient’s spouse, he has been intermittent non-radiating chest pain x 1 month but refused to come to the ED.  Most of the info is obtained from patient’s spouse.  He has been chronically c/o dizziness and left arm pain since ICD implantation.  Patient has been off his medications (ASA, Plavix, and Lipitor) x 1 month and Entresto 8 months ago due to lack of insurance.  Denies CP, DAVIS, orthopnea, weight gain, fever, sick contacts, or recent travels.  Of note, patient has had difficulty remembering events since his cardiac arrest in 2005, according to his neurologist due to anoxia per spouse.  In OSH ED, EKG showed LBBB and elevated cardiac enzymes (VPZ=686, CK/MB=20.7, Trop I=27.5).  Transferred to [facility] for concern of STEMI.  CTA of the chest, abdomen, and pelvis done showing no evidence of aortic dissection; 3.7 cm infrarenal abdominal aortic aneurysm with partial thrombotic plaque.  No central pulmonary embolism.  Small bilateral pleural effusions.  CT head showed no intracranial hemorrhage, mass effect or large acute cortical infarct.    ====================  NEW EVENTS:  ====================    MEDICATIONS  (STANDING):  amiodarone    Tablet 400 milliGRAM(s) Oral two times a day  amiodarone    Tablet 200 milliGRAM(s) Oral daily  aspirin  chewable 81 milliGRAM(s) Oral daily  captopril 12.5 milliGRAM(s) Oral three times a day  docusate sodium 100 milliGRAM(s) Oral daily  influenza   Vaccine 0.5 milliLiter(s) IntraMuscular once  milrinone Infusion 0.5 MICROgram(s)/kG/Min (13.035 mL/Hr) IV Continuous <Continuous>  senna 2 Tablet(s) Oral at bedtime    MEDICATIONS  (PRN):  aluminum hydroxide/magnesium hydroxide/simethicone Suspension 30 milliLiter(s) Oral every 6 hours PRN Dyspepsia    ====================  VITALS (Last 12 hrs):  ====================    T(C): 36.9 (05-04-18 @ 20:00), Max: 36.9 (05-04-18 @ 17:45)  T(F): 98.5 (05-04-18 @ 20:00), Max: 98.5 (05-04-18 @ 20:00)  HR: 116 (05-04-18 @ 22:00) (94 - 116)  BP: 84/50 (05-04-18 @ 22:00) (70/53 - 99/64)  BP(mean): 59 (05-04-18 @ 22:00) (58 - 77)  ABP: 92/56 (05-04-18 @ 16:25) (90/62 - 138/104)  ABP(mean): 68 (05-04-18 @ 16:25) (68 - 116)  RR: 18 (05-04-18 @ 22:00) (16 - 27)  SpO2: 98% (05-04-18 @ 22:00) (94% - 98%)  Wt(kg): --  CVP(mm Hg): 5 (05-04-18 @ 16:35) (2 - 7)  CVP(cm H2O): --  CO: 4.6 (05-04-18 @ 14:12) (4.6 - 4.6)  CI: 2.3 (05-04-18 @ 14:12) (2.3 - 2.3)  PA: 41/- (05-04-18 @ 16:40) (29/12 - 44/21)  PA(mean): 9 (05-04-18 @ 16:40) (9 - 108)  PCWP: 17 (05-04-18 @ 14:12) (17 - 17)  SVR: 1354 (05-04-18 @ 14:12) (1354 - 1354)  PVR: 138 (05-04-18 @ 14:12) (138 - 138)    I&O's Summary    03 May 2018 07:01  -  04 May 2018 07:00  --------------------------------------------------------  IN: 1703.7 mL / OUT: 2740 mL / NET: -1036.3 mL    04 May 2018 07:01  -  04 May 2018 22:48  --------------------------------------------------------  IN: 431 mL / OUT: 1525 mL / NET: -1094 mL    ====================  NEW LABS:  ====================    05-04    129<L>  |  92<L>  |  18  ----------------------------<  134<H>  4.0   |  22  |  1.16    Ca    8.0<L>      04 May 2018 14:09  Phos  2.5     05-04  Mg     1.9     05-04    TPro  5.7<L>  /  Alb  3.2<L>  /  TBili  2.7<H>  /  DBili  x   /  AST  265<H>  /  ALT  1052<H>  /  AlkPhos  96  05-04    PT/INR - ( 03 May 2018 05:17 )   PT: 16.0 sec;   INR: 1.46 ratio    PTT - ( 04 May 2018 04:17 )  PTT:52.9 sec    ABG - ( 04 May 2018 14:07 )  pH, Arterial: 7.54  pH, Blood: x     /  pCO2: 29    /  pO2: 73    / HCO3: 24    / Base Excess: 2.8   /  SaO2: 96        ====================  PLAN:  ====================  - ====================  CCU MIDNIGHT ROUNDS  ====================    SHANIQUA MCKENZIE  [id number]  Patient is a 63y old  Male who presents with a chief complaint of dyspnea    ====================  SUMMARY:  ====================  This is a 62 y/o M with CAD s/p MI and cardiac arrests, remote stents x 10 (2005), HFrEF s/p AICD ([employer]) with battery change 3 years ago, AAA, gastic ulcers, HLD, and BPH presented to [facility] c/o difficulty breathing and abdominal pain with wretching which they attributed to constipation.  Per patient’s spouse, he has been intermittent non-radiating chest pain x 1 month but refused to come to the ED.  Most of the info is obtained from patient’s spouse.  He has been chronically c/o dizziness and left arm pain since ICD implantation.  Patient has been off his medications (ASA, Plavix, and Lipitor) x 1 month and Entresto 8 months ago due to lack of insurance.  Denies CP, DAVIS, orthopnea, weight gain, fever, sick contacts, or recent travels.  Of note, patient has had difficulty remembering events since his cardiac arrest in 2005, according to his neurologist due to anoxia per spouse.  In OSH ED, EKG showed LBBB and elevated cardiac enzymes (UBB=472, CK/MB=20.7, Trop I=27.5).  Transferred to [facility] for concern of STEMI.  CTA of the chest, abdomen, and pelvis done showing no evidence of aortic dissection; 3.7 cm infrarenal abdominal aortic aneurysm with partial thrombotic plaque.  No central pulmonary embolism.  Small bilateral pleural effusions.  CT head showed no intracranial hemorrhage, mass effect or large acute cortical infarct.    ====================  NEW EVENTS: Balloon pump and swan removed today. Pt on milrinone 0.5 with MAP in low 60s but pulse pressure is narrow with BP 70s/50s. No chest pain, dyspnea, palpitations, n/v.   ====================    MEDICATIONS  (STANDING):  amiodarone    Tablet 400 milliGRAM(s) Oral two times a day  amiodarone    Tablet 200 milliGRAM(s) Oral daily  aspirin  chewable 81 milliGRAM(s) Oral daily  captopril 12.5 milliGRAM(s) Oral three times a day  docusate sodium 100 milliGRAM(s) Oral daily  influenza   Vaccine 0.5 milliLiter(s) IntraMuscular once  milrinone Infusion 0.5 MICROgram(s)/kG/Min (13.035 mL/Hr) IV Continuous <Continuous>  senna 2 Tablet(s) Oral at bedtime    MEDICATIONS  (PRN):  aluminum hydroxide/magnesium hydroxide/simethicone Suspension 30 milliLiter(s) Oral every 6 hours PRN Dyspepsia    ====================  VITALS (Last 12 hrs):  ====================    T(C): 36.9 (05-04-18 @ 20:00), Max: 36.9 (05-04-18 @ 17:45)  T(F): 98.5 (05-04-18 @ 20:00), Max: 98.5 (05-04-18 @ 20:00)  HR: 116 (05-04-18 @ 22:00) (94 - 116)  BP: 84/50 (05-04-18 @ 22:00) (70/53 - 99/64)  BP(mean): 59 (05-04-18 @ 22:00) (58 - 77)  ABP: 92/56 (05-04-18 @ 16:25) (90/62 - 138/104)  ABP(mean): 68 (05-04-18 @ 16:25) (68 - 116)  RR: 18 (05-04-18 @ 22:00) (16 - 27)  SpO2: 98% (05-04-18 @ 22:00) (94% - 98%)  Wt(kg): --  CVP(mm Hg): 5 (05-04-18 @ 16:35) (2 - 7)  CVP(cm H2O): --  CO: 4.6 (05-04-18 @ 14:12) (4.6 - 4.6)  CI: 2.3 (05-04-18 @ 14:12) (2.3 - 2.3)  PA: 41/- (05-04-18 @ 16:40) (29/12 - 44/21)  PA(mean): 9 (05-04-18 @ 16:40) (9 - 108)  PCWP: 17 (05-04-18 @ 14:12) (17 - 17)  SVR: 1354 (05-04-18 @ 14:12) (1354 - 1354)  PVR: 138 (05-04-18 @ 14:12) (138 - 138)    I&O's Summary    03 May 2018 07:01  -  04 May 2018 07:00  --------------------------------------------------------  IN: 1703.7 mL / OUT: 2740 mL / NET: -1036.3 mL    04 May 2018 07:01  -  04 May 2018 22:48  --------------------------------------------------------  IN: 431 mL / OUT: 1525 mL / NET: -1094 mL    ====================  NEW LABS:  ====================    05-04    129<L>  |  92<L>  |  18  ----------------------------<  134<H>  4.0   |  22  |  1.16    Ca    8.0<L>      04 May 2018 14:09  Phos  2.5     05-04  Mg     1.9     05-04    TPro  5.7<L>  /  Alb  3.2<L>  /  TBili  2.7<H>  /  DBili  x   /  AST  265<H>  /  ALT  1052<H>  /  AlkPhos  96  05-04    PT/INR - ( 03 May 2018 05:17 )   PT: 16.0 sec;   INR: 1.46 ratio    PTT - ( 04 May 2018 04:17 )  PTT:52.9 sec    ABG - ( 04 May 2018 14:07 )  pH, Arterial: 7.54  pH, Blood: x     /  pCO2: 29    /  pO2: 73    / HCO3: 24    / Base Excess: 2.8   /  SaO2: 96        ====================  PLAN:  1. Low output HFrEF, EF 17%  - Not a candidate for LVAD  - Now DNR/DNI, ICD turned off based on palliative care meeting  - Continue milrinone at 0.5   - Continue captopril 12.5 TID  - Plan is for home hospice with dc on milrinone if need, follow up advanced HF  2. NSVT: continue amiodarone load to 10g, monitor and replete electrolytes K>4, Mg>2  ==================== ====================  CCU MIDNIGHT ROUNDS  ====================    SHANIQUA MCKENZIE  [id number]  Patient is a 63y old  Male who presents with a chief complaint of dyspnea    ====================  SUMMARY:  ====================  This is a 64 y/o M with CAD s/p MI and cardiac arrests, remote stents x 10 (2005), HFrEF s/p AICD ([employer]) with battery change 3 years ago, AAA, gastic ulcers, HLD, and BPH presented to [facility] c/o difficulty breathing and abdominal pain with wretching which they attributed to constipation.  Per patient’s spouse, he has been intermittent non-radiating chest pain x 1 month but refused to come to the ED.  Most of the info is obtained from patient’s spouse.  He has been chronically c/o dizziness and left arm pain since ICD implantation.  Patient has been off his medications (ASA, Plavix, and Lipitor) x 1 month and Entresto 8 months ago due to lack of insurance.  Denies CP, DAVIS, orthopnea, weight gain, fever, sick contacts, or recent travels.  Of note, patient has had difficulty remembering events since his cardiac arrest in 2005, according to his neurologist due to anoxia per spouse.  In OSH ED, EKG showed LBBB and elevated cardiac enzymes (MAR=932, CK/MB=20.7, Trop I=27.5).  Transferred to [facility] for concern of STEMI.  CTA of the chest, abdomen, and pelvis done showing no evidence of aortic dissection; 3.7 cm infrarenal abdominal aortic aneurysm with partial thrombotic plaque.  No central pulmonary embolism.  Small bilateral pleural effusions.  CT head showed no intracranial hemorrhage, mass effect or large acute cortical infarct.    ====================  NEW EVENTS: Balloon pump and swan removed today. Pt on milrinone 0.5 with MAP in low 60s but pulse pressure is narrow with BP 70s/50s. No chest pain, dyspnea, palpitations, n/v.   ====================    MEDICATIONS  (STANDING):  amiodarone    Tablet 400 milliGRAM(s) Oral two times a day  amiodarone    Tablet 200 milliGRAM(s) Oral daily  aspirin  chewable 81 milliGRAM(s) Oral daily  captopril 12.5 milliGRAM(s) Oral three times a day  docusate sodium 100 milliGRAM(s) Oral daily  influenza   Vaccine 0.5 milliLiter(s) IntraMuscular once  milrinone Infusion 0.5 MICROgram(s)/kG/Min (13.035 mL/Hr) IV Continuous <Continuous>  senna 2 Tablet(s) Oral at bedtime    MEDICATIONS  (PRN):  aluminum hydroxide/magnesium hydroxide/simethicone Suspension 30 milliLiter(s) Oral every 6 hours PRN Dyspepsia    ====================  VITALS (Last 12 hrs):  ====================    T(C): 36.9 (05-04-18 @ 20:00), Max: 36.9 (05-04-18 @ 17:45)  T(F): 98.5 (05-04-18 @ 20:00), Max: 98.5 (05-04-18 @ 20:00)  HR: 116 (05-04-18 @ 22:00) (94 - 116)  BP: 84/50 (05-04-18 @ 22:00) (70/53 - 99/64)  BP(mean): 59 (05-04-18 @ 22:00) (58 - 77)  ABP: 92/56 (05-04-18 @ 16:25) (90/62 - 138/104)  ABP(mean): 68 (05-04-18 @ 16:25) (68 - 116)  RR: 18 (05-04-18 @ 22:00) (16 - 27)  SpO2: 98% (05-04-18 @ 22:00) (94% - 98%)  Wt(kg): --  CVP(mm Hg): 5 (05-04-18 @ 16:35) (2 - 7)  CVP(cm H2O): --  CO: 4.6 (05-04-18 @ 14:12) (4.6 - 4.6)  CI: 2.3 (05-04-18 @ 14:12) (2.3 - 2.3)  PA: 41/- (05-04-18 @ 16:40) (29/12 - 44/21)  PA(mean): 9 (05-04-18 @ 16:40) (9 - 108)  PCWP: 17 (05-04-18 @ 14:12) (17 - 17)  SVR: 1354 (05-04-18 @ 14:12) (1354 - 1354)  PVR: 138 (05-04-18 @ 14:12) (138 - 138)    I&O's Summary    03 May 2018 07:01  -  04 May 2018 07:00  --------------------------------------------------------  IN: 1703.7 mL / OUT: 2740 mL / NET: -1036.3 mL    04 May 2018 07:01  -  04 May 2018 22:48  --------------------------------------------------------  IN: 431 mL / OUT: 1525 mL / NET: -1094 mL    ====================  NEW LABS:  ====================    05-04    129<L>  |  92<L>  |  18  ----------------------------<  134<H>  4.0   |  22  |  1.16    Ca    8.0<L>      04 May 2018 14:09  Phos  2.5     05-04  Mg     1.9     05-04    TPro  5.7<L>  /  Alb  3.2<L>  /  TBili  2.7<H>  /  DBili  x   /  AST  265<H>  /  ALT  1052<H>  /  AlkPhos  96  05-04    PT/INR - ( 03 May 2018 05:17 )   PT: 16.0 sec;   INR: 1.46 ratio    PTT - ( 04 May 2018 04:17 )  PTT:52.9 sec    ABG - ( 04 May 2018 14:07 )  pH, Arterial: 7.54  pH, Blood: x     /  pCO2: 29    /  pO2: 73    / HCO3: 24    / Base Excess: 2.8   /  SaO2: 96        ====================  PLAN:  1. Low output HFrEF, EF 17%  - Not a candidate for LVAD  - Now DNR/DNI, ICD turned off based on palliative care meeting  - Continue milrinone at 0.5   - Continue captopril 12.5 TID  - Continue ASA  - Plan is for home hospice with dc on milrinone if need, follow up advanced HF  2. NSVT: continue amiodarone load to 10g, monitor and replete electrolytes K>4, Mg>2  ====================

## 2018-05-04 NOTE — PROGRESS NOTE ADULT - PROBLEM SELECTOR PLAN 2
give lasix 40mg IVP x1

## 2018-05-04 NOTE — CHART NOTE - NSCHARTNOTEFT_GEN_A_CORE
Removal of IABP    Pulses in the right lower extremity are audible by doppler. The patient was placed in the supine position. The insertion site was identified and the sutures were removed per protocol.  The 8 Irish femoral sheath was then removed. Direct pressure was applied for 30 minutes.     Monitoring of the right groin and both lower extremities including neuro-vascular checks and vital signs every 15 minutes x 4, then every 30 minutes x 2, then every 1 hour was ordered.    Complications: None/Other

## 2018-05-04 NOTE — PROGRESS NOTE ADULT - PROBLEM SELECTOR PROBLEM 3
JANENE (acute kidney injury)

## 2018-05-05 VITALS — RESPIRATION RATE: 20 BRPM | HEART RATE: 122 BPM | OXYGEN SATURATION: 97 %

## 2018-05-05 LAB
M TB TUBERC IFN-G BLD QL: 0.01 IU/ML — SIGNIFICANT CHANGE UP
M TB TUBERC IFN-G BLD QL: 0.27 IU/ML — SIGNIFICANT CHANGE UP
M TB TUBERC IFN-G BLD QL: NEGATIVE — SIGNIFICANT CHANGE UP
MITOGEN IGNF BCKGRD COR BLD-ACNC: 1.05 IU/ML — SIGNIFICANT CHANGE UP

## 2018-05-05 NOTE — CHART NOTE - NSCHARTNOTEFT_GEN_A_CORE
Called by RN to evaluate pt after he became unresponsive following VT arrest. He was DNR/DNI. On examination pt was unresponsive to tactile stimuli without palpable pulses or spontaneous respiration. Pt had no audible heart sounds. His pupils were fixed and dilated without corneal reflexes. Pt was pronounced dead at 1:55 am. Pt's wife Fanta Franklin was contacted. Cardiology follow Dr. Tiffanie Mehta was informed. Called by RN to evaluate pt after he became unresponsive following VT arrest which progressed to Vifib. He was DNR/DNI. On examination pt was unresponsive to tactile stimuli without palpable pulses or spontaneous respirations. He had no audible heart sounds. His pupils were fixed and dilated without corneal reflexes. Pt was pronounced dead at 1:55 am. Pt's wife Fanta Franklin was contacted. Cardiology follow Dr. Tiffanie Mehta was informed. Called by RN to evaluate pt after he became unresponsive following VT arrest which progressed to Vifib. He was DNR/DNI. On examination pt was unresponsive to tactile stimuli without palpable pulses or spontaneous respirations. He had no audible heart sounds. His pupils were fixed and dilated without corneal reflexes. Pt was pronounced dead at 1:55 am. Pt's wife Fanta Franklin was contacted, declines autopsy. Cardiology follow Dr. Tiffanie Mehta was informed.

## 2018-05-05 NOTE — DISCHARGE NOTE FOR THE EXPIRED PATIENT - HOSPITAL COURSE
63M with CAD s/p MI and cardiac arrests, remote stents x 10 (2005), HFrEF s/p AICD ([employer]), AAA, gastric ulcers, HLD, and BPH presented to [facility] with worsened dyspnea, found to have LBBB and NSTEMI, and transferred to [facility] for cardiac catheterization. Pt was found to be in acute pulmonary edema which improved with BIPAP and diuresis. He was treated with aspirin and brillinta and underwent diagnostic cardiac catheterization revealing triple vessel disease and markedly elevated filling pressures. Pt received an intraaortic balloon pump, the advanced heart failure service was consulted, and pt was treated for low output acute on chronic decompensated heart failure with milrinone and furosemide infusions. Pt initially improved symptomatically but hospital course was complicated polymorphic VT storm terminated by ICD therapy and pt was started an amiodarone load. Pt was seen by CT surgery and evaluated for LVAD but it was determined that he would not be a candidate for LVAD. Goals of care were discussed with the patient and his wife with the assistance of the palliative care service. Pt was made DNR/DNI as per goals of care discussion, pt's ICD therapy was turned off, and discussions for discharge with hospice care were initiated. Pt continued to have frequent ectopy on telemetry. On 5/4 pt experienced VT arrest which progressed to Vifib and was found to be unresponsive. He was pronounced dead at 1:55 AM. Pt's wife Fanta Franklin was notified.

## 2018-05-23 PROCEDURE — 84156 ASSAY OF PROTEIN URINE: CPT

## 2018-05-23 PROCEDURE — 86645 CMV ANTIBODY IGM: CPT

## 2018-05-23 PROCEDURE — 84484 ASSAY OF TROPONIN QUANT: CPT

## 2018-05-23 PROCEDURE — 80053 COMPREHEN METABOLIC PANEL: CPT

## 2018-05-23 PROCEDURE — 86709 HEPATITIS A IGM ANTIBODY: CPT

## 2018-05-23 PROCEDURE — 82728 ASSAY OF FERRITIN: CPT

## 2018-05-23 PROCEDURE — C1887: CPT

## 2018-05-23 PROCEDURE — 93923 UPR/LXTR ART STDY 3+ LVLS: CPT

## 2018-05-23 PROCEDURE — 87389 HIV-1 AG W/HIV-1&-2 AB AG IA: CPT

## 2018-05-23 PROCEDURE — 86803 HEPATITIS C AB TEST: CPT

## 2018-05-23 PROCEDURE — 76700 US EXAM ABDOM COMPLETE: CPT

## 2018-05-23 PROCEDURE — 82553 CREATINE MB FRACTION: CPT

## 2018-05-23 PROCEDURE — 84100 ASSAY OF PHOSPHORUS: CPT

## 2018-05-23 PROCEDURE — 86765 RUBEOLA ANTIBODY: CPT

## 2018-05-23 PROCEDURE — C1889: CPT

## 2018-05-23 PROCEDURE — 87340 HEPATITIS B SURFACE AG IA: CPT

## 2018-05-23 PROCEDURE — 86644 CMV ANTIBODY: CPT

## 2018-05-23 PROCEDURE — 84132 ASSAY OF SERUM POTASSIUM: CPT

## 2018-05-23 PROCEDURE — 86695 HERPES SIMPLEX TYPE 1 TEST: CPT

## 2018-05-23 PROCEDURE — 86777 TOXOPLASMA ANTIBODY: CPT

## 2018-05-23 PROCEDURE — 83880 ASSAY OF NATRIURETIC PEPTIDE: CPT

## 2018-05-23 PROCEDURE — 93880 EXTRACRANIAL BILAT STUDY: CPT

## 2018-05-23 PROCEDURE — 86663 EPSTEIN-BARR ANTIBODY: CPT

## 2018-05-23 PROCEDURE — 85014 HEMATOCRIT: CPT

## 2018-05-23 PROCEDURE — 84550 ASSAY OF BLOOD/URIC ACID: CPT

## 2018-05-23 PROCEDURE — 84436 ASSAY OF TOTAL THYROXINE: CPT

## 2018-05-23 PROCEDURE — 85027 COMPLETE CBC AUTOMATED: CPT

## 2018-05-23 PROCEDURE — 86762 RUBELLA ANTIBODY: CPT

## 2018-05-23 PROCEDURE — 82803 BLOOD GASES ANY COMBINATION: CPT

## 2018-05-23 PROCEDURE — 94660 CPAP INITIATION&MGMT: CPT

## 2018-05-23 PROCEDURE — C8929: CPT

## 2018-05-23 PROCEDURE — 85730 THROMBOPLASTIN TIME PARTIAL: CPT

## 2018-05-23 PROCEDURE — 86431 RHEUMATOID FACTOR QUANT: CPT

## 2018-05-23 PROCEDURE — 86664 EPSTEIN-BARR NUCLEAR ANTIGEN: CPT

## 2018-05-23 PROCEDURE — 86480 TB TEST CELL IMMUN MEASURE: CPT

## 2018-05-23 PROCEDURE — C1769: CPT

## 2018-05-23 PROCEDURE — 83735 ASSAY OF MAGNESIUM: CPT

## 2018-05-23 PROCEDURE — 86735 MUMPS ANTIBODY: CPT

## 2018-05-23 PROCEDURE — 83615 LACTATE (LD) (LDH) ENZYME: CPT

## 2018-05-23 PROCEDURE — 86787 VARICELLA-ZOSTER ANTIBODY: CPT

## 2018-05-23 PROCEDURE — 86140 C-REACTIVE PROTEIN: CPT

## 2018-05-23 PROCEDURE — 85610 PROTHROMBIN TIME: CPT

## 2018-05-23 PROCEDURE — 80048 BASIC METABOLIC PNL TOTAL CA: CPT

## 2018-05-23 PROCEDURE — 93925 LOWER EXTREMITY STUDY: CPT

## 2018-05-23 PROCEDURE — 86708 HEPATITIS A ANTIBODY: CPT

## 2018-05-23 PROCEDURE — 86665 EPSTEIN-BARR CAPSID VCA: CPT

## 2018-05-23 PROCEDURE — 84480 ASSAY TRIIODOTHYRONINE (T3): CPT

## 2018-05-23 PROCEDURE — 93456 R HRT CORONARY ARTERY ANGIO: CPT

## 2018-05-23 PROCEDURE — 93930 UPPER EXTREMITY STUDY: CPT

## 2018-05-23 PROCEDURE — 93005 ELECTROCARDIOGRAM TRACING: CPT

## 2018-05-23 PROCEDURE — 80061 LIPID PANEL: CPT

## 2018-05-23 PROCEDURE — C1894: CPT

## 2018-05-23 PROCEDURE — 86696 HERPES SIMPLEX TYPE 2 TEST: CPT

## 2018-05-23 PROCEDURE — 86706 HEP B SURFACE ANTIBODY: CPT

## 2018-05-23 PROCEDURE — 80307 DRUG TEST PRSMV CHEM ANLYZR: CPT

## 2018-05-23 PROCEDURE — 86780 TREPONEMA PALLIDUM: CPT

## 2018-05-23 PROCEDURE — 86850 RBC ANTIBODY SCREEN: CPT

## 2018-05-23 PROCEDURE — 86901 BLOOD TYPING SEROLOGIC RH(D): CPT

## 2018-05-23 PROCEDURE — 83036 HEMOGLOBIN GLYCOSYLATED A1C: CPT

## 2018-05-23 PROCEDURE — 81003 URINALYSIS AUTO W/O SCOPE: CPT

## 2018-05-23 PROCEDURE — 71045 X-RAY EXAM CHEST 1 VIEW: CPT

## 2018-05-23 PROCEDURE — 83550 IRON BINDING TEST: CPT

## 2018-05-23 PROCEDURE — 82947 ASSAY GLUCOSE BLOOD QUANT: CPT

## 2018-05-23 PROCEDURE — 84295 ASSAY OF SERUM SODIUM: CPT

## 2018-05-23 PROCEDURE — 33967 INSERT I-AORT PERCUT DEVICE: CPT

## 2018-05-23 PROCEDURE — 74018 RADEX ABDOMEN 1 VIEW: CPT

## 2018-05-23 PROCEDURE — 86790 VIRUS ANTIBODY NOS: CPT

## 2018-05-23 PROCEDURE — 83605 ASSAY OF LACTIC ACID: CPT

## 2018-05-23 PROCEDURE — 85576 BLOOD PLATELET AGGREGATION: CPT

## 2018-05-23 PROCEDURE — 82330 ASSAY OF CALCIUM: CPT

## 2018-05-23 PROCEDURE — 84134 ASSAY OF PREALBUMIN: CPT

## 2018-05-23 PROCEDURE — 82550 ASSAY OF CK (CPK): CPT

## 2018-05-23 PROCEDURE — 84443 ASSAY THYROID STIM HORMONE: CPT

## 2018-05-23 PROCEDURE — 87521 HEPATITIS C PROBE&RVRS TRNSC: CPT

## 2018-05-23 PROCEDURE — 86900 BLOOD TYPING SEROLOGIC ABO: CPT

## 2018-05-23 PROCEDURE — 87517 HEPATITIS B DNA QUANT: CPT

## 2018-05-23 PROCEDURE — 82435 ASSAY OF BLOOD CHLORIDE: CPT

## 2018-05-23 PROCEDURE — 94010 BREATHING CAPACITY TEST: CPT

## 2018-05-23 PROCEDURE — 85652 RBC SED RATE AUTOMATED: CPT

## 2018-05-23 PROCEDURE — 82248 BILIRUBIN DIRECT: CPT

## 2018-05-23 PROCEDURE — 86705 HEP B CORE ANTIBODY IGM: CPT

## 2018-05-23 PROCEDURE — 75716 ARTERY X-RAYS ARMS/LEGS: CPT | Mod: 59

## 2018-07-17 PROBLEM — I95.9 HYPOTENSION, UNSPECIFIED: Chronic | Status: INACTIVE | Noted: 2018-05-01 | Resolved: 2018-05-01

## 2023-01-19 NOTE — DIETITIAN INITIAL EVALUATION ADULT. - NUTRITION DIAGNOSTIC TERMINOLOGY #1
"Patient called today to express his concern and frustration about \"all my problems.\" He indicated that he did not feel like he was getting better and that his previous visit felt like they were only focused on one of his medications instead of the problems that he's experiencing.    After reviewing his previous visits, I let him know that I thought that his upcoming appt next week on 1.26.23 with Dr. Naidu would help him feel like he has a plan going forward. Pt indicated that he did not wish to see \"students\" anymore and I reiterated that Dr. 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 Naidu only going forward.     I did review the schedule to see if Dr. Naidu had any appts available sooner than 1.26.23, but he did not. I told the patient that if we had any cancellations before next Thursday we would call him (and I verified the phone number on his chart was correct).    At this point, the patient said, \"Well, can I put on a donor list in case I die before then?\" I asked the patient if he was thinking about harming himself and he said, \"No, no. It's not like that.\" I told him that if feelings like that came up, he could go to the ED. Patient said he was \"just joking\" and that he would be at the appt on 1.26.23.   "
Knowledge and Beliefs

## 2023-01-31 NOTE — PROGRESS NOTE ADULT - ASSESSMENT
64 y/o M with CAD s/p MI and cardiac arrests, remote stents x 10 (2005), HFrEF s/p AICD ([employer]) with battery change 3 years ago, AAA, gastic ulcers, HLD, and BPH admitted for NSTEMI c/b acute on chronic decompensated systolic heart failure. 64 y/o M with CAD s/p MI and cardiac arrests, remote stents x 10 (2005), HFrEF s/p AICD ([employer]) with battery change 3 years ago, AAA, gastic ulcers, HLD, and BPH admitted for NSTEMI c/b acute on chronic decompensated systolic heart failure, requiring CCU for acute pulmonary edema    Neuro:  - Awake, alert, no issues.     CV:  Acute on Chronic Systolic Heart Failure  - EF approx 17% w/ severe LV dysfunction and MR  - Continue diuresis  - IABP, PA catheter in place  - Consider milrinone pending HF eval  NSTEMI  - S/p catheterization revealing triple vessel disease  - CT surgery, heart failure consulting  - Continue aspirin, brilinta, atorvastatin, full heparin anticoagulation    Pulm:  Acute Pulmonary Edema  - Continue diuresis  - Wean BiPAP as tolerated  - Oxygen PRN to maintain SpO2 >92%    GI:  - NPO while on BiPAP    :   Acute Kidney Injury  - Creatinine 2.24 on presentation, unknown baseline  - Continue to trend BUN/Cr  - Monitor UOP  - Maintain strict Ins and Outs    Heme:  - Trend CBC  - DAPT, heparin gtt    Endo:  - F/u A1c, TSH  - Monitor glucose on labs    ID:  - Afebrile, no signs of infection  - Monitor for fevers, leukocytosis.    Dispo; CCU Calm
